# Patient Record
Sex: FEMALE | Race: WHITE | NOT HISPANIC OR LATINO | Employment: OTHER | URBAN - METROPOLITAN AREA
[De-identification: names, ages, dates, MRNs, and addresses within clinical notes are randomized per-mention and may not be internally consistent; named-entity substitution may affect disease eponyms.]

---

## 2017-02-06 DIAGNOSIS — F41.9 ANXIETY: Primary | ICD-10-CM

## 2017-02-06 NOTE — TELEPHONE ENCOUNTER
Escitalopram 20 mg     Last Written Prescription Date: 08/08/2016  Last Fill Quantity: 90, # refills: 1  Last Office Visit with [facility]:  11/2/2016   Next 5 appointments (look out 90 days)     Mar 09, 2017  3:45 PM   Return Visit with Sridhar Doherty MD   [facility]    [address] [place] [address] [phone]                   Last PHQ-9 score on record=   PHQ-9 SCORE 3/15/2016   Total Score -   Total Score MyChart -   Total Score 6

## 2017-02-07 NOTE — TELEPHONE ENCOUNTER
Routing refill request to provider for review/approval because:  PHQ-9 is greater than 4  Route to provider to review refill and depression/anxiety plan.    Garcia Laureano RN

## 2017-02-08 RX ORDER — ESCITALOPRAM OXALATE 20 MG/1
20 TABLET ORAL DAILY
Qty: 30 TABLET | Refills: 0 | Status: SHIPPED | OUTPATIENT
Start: 2017-02-08 | End: 2017-02-27

## 2017-02-08 NOTE — TELEPHONE ENCOUNTER
LM for pt to return call, when call is returned give information below and schedule OV.      Jody Powers CMA ([facility])

## 2017-02-08 NOTE — TELEPHONE ENCOUNTER
Pt needs ov , not evisit or phone needs ov before more rd, 30 days approved  Tiffanie Montes PA-C

## 2017-02-13 ENCOUNTER — TELEPHONE (OUTPATIENT)
Dept: DERMATOLOGY | Facility: CLINIC | Age: 47
End: 2017-02-13

## 2017-02-13 NOTE — TELEPHONE ENCOUNTER
[facility] Call Center    Phone Message    Name of Caller: Sumaya     Phone Number: Home number on file [phone] (home) or Cell number on file:    Telephone Information:   Mobile [phone]       Best time to return call: Any    May a detailed message be left on voicemail: yes    Relation to patient: Self    Reason for Call: Order(s): Other:  What is being requested: Patient is requesting a order for Subcutaneous nodule, Most consistent with cyst, right neck that was discussed at last Office visit with Dr. Golden 03/24/16.    Reason for requested: Patient states they discuss a surgeon referral for cysts/nodules. Patient also has another cyst that developed. Please call to discuss.  Date needed: asap  Provider name: Dr. Golden       Action Taken: Message routed to:  Adult Clinics: Dermatology p [phone]

## 2017-02-13 NOTE — TELEPHONE ENCOUNTER
Millicent Golden MD   [facility] Derm Patient Care 3 hours ago (11:31 AM)               This patient can be schedule with Marcelino for excision in our usual outpatient clinic.            Called patient and gave MD notes and scheduled appoint for 4/6/17 at 1:15pm for an excision with Dr. Benson. Also added the patient to the wait list to see if procedure could be done sooner then 4/6/17.    Amada Zuniga Medical Assistant

## 2017-02-13 NOTE — TELEPHONE ENCOUNTER
This LPN called and spoke with patient. Patient stated that she has a cyst on the back on her neck that she would like removed. According to the patient, Dr. Golden said it could not be in clinic but at a hospital. She also stated she spoke with a financial counselor last year (was not sure who, possible Caroline Fuentes) regarding a PA. Patient was last seen in July 2016. This LPN does not see any documentation regarding this. Forwarding telephone encounter to Dr. Golden and Denisha Vizcarra with how they advise how and where we proceed with the matter.    Nicole Gavin LPN

## 2017-02-13 NOTE — TELEPHONE ENCOUNTER
If it were to be done in a hospital then I would not PA the procedure. If it were to be done in the ASC I would do the PA depending on the physician. Dr. Golden- would this need to be done elsewhere?

## 2017-02-21 NOTE — PROGRESS NOTES
"  SUBJECTIVE:                                                    Sumaya Gatica is a 46 year old female who presents to clinic today for the following health issues:        Depression and Anxiety Follow-Up    Status since last visit: No change    Other associated symptoms:depression has been good but anxiety maybe a little worse, but so much better overall    Complicating factors: decreased  Concentration and easy distractibility .  Has never been diagnosed with ADD ADHD  States she is able to complete tasks but her mind's eye was wandering and thinking about what's next    Significant life event: Yes-  Distracted easily, restless-can't sit still     Current substance abuse: None    PHQ-9 SCORE 5/21/2015 3/14/2016 3/15/2016   Total Score 6 - -   Total Score MyChart - 6 (Mild depression) -   Total Score - - 6     GEOVANY-7 SCORE 5/21/2015 3/14/2016 3/15/2016   Total Score 8 - -   Total Score - 18 (severe anxiety) -   Total Score - - 18        PHQ-9  English      PHQ-9   Any Language     GAD7       Amount of exercise or physical activity: mostly with work as she does     Problems taking medications regularly: No    Medication side effects: none  Diet: regular (no restrictions)        Problem list and histories reviewed & adjusted, as indicated.  Additional history: as documented    BP Readings from Last 3 Encounters:   02/27/17 106/64   08/30/16 100/70   07/05/16 135/85    Wt Readings from Last 3 Encounters:   02/27/17 201 lb (91.2 kg)   08/30/16 166 lb (75.3 kg)   03/28/16 169 lb 12.8 oz (77 kg)                  Labs reviewed in [facility]    ROS:  As documented above     OBJECTIVE:                                                    /64  Pulse 74  Temp 98.2  F (36.8  C) (Oral)  Resp 12  Ht 5' 8\" (1.727 m)  Wt 201 lb (91.2 kg)  LMP 06/15/2015  BMI 30.56 kg/m2  Body mass index is 30.56 kg/(m^2).  GENERAL: healthy, alert and no distress  PSYCH: mentation appears normal, affect normal/bright    Diagnostic Test " Results:  none      ASSESSMENT/PLAN:                                                            1. Anxiety  We'll add Wellbutrin to see if this helps with difficulty concentrating  - escitalopram (LEXAPRO) 20 MG tablet; Take 1 tablet (20 mg) by mouth daily  Dispense: 90 tablet; Refill: 1  - buPROPion (WELLBUTRIN XL) 150 MG 24 hr tablet; Take 1 tablet (150 mg) by mouth every morning  Dispense: 30 tablet; Refill: 1    2. Difficulty concentrating  If this is not successful, consider referral for ADD ADHD evaluation  - buPROPion (WELLBUTRIN XL) 150 MG 24 hr tablet; Take 1 tablet (150 mg) by mouth every morning  Dispense: 30 tablet; Refill: 1    3. CARDIOVASCULAR SCREENING; LDL GOAL LESS THAN 160    - **Lipid panel reflex to direct LDL FUTURE anytime; Future    We'll do a phone visit in 1 month for a recheck    Tiffanie Montes PA-C  [facility]  Electronically signed by Tiffanie Montes PA-C

## 2017-02-27 ENCOUNTER — OFFICE VISIT (OUTPATIENT)
Dept: FAMILY MEDICINE | Facility: OTHER | Age: 47
End: 2017-02-27
Payer: COMMERCIAL

## 2017-02-27 VITALS
WEIGHT: 201 LBS | HEIGHT: 68 IN | TEMPERATURE: 98.2 F | HEART RATE: 74 BPM | BODY MASS INDEX: 30.46 KG/M2 | SYSTOLIC BLOOD PRESSURE: 106 MMHG | DIASTOLIC BLOOD PRESSURE: 64 MMHG | RESPIRATION RATE: 12 BRPM

## 2017-02-27 DIAGNOSIS — F41.9 ANXIETY: ICD-10-CM

## 2017-02-27 DIAGNOSIS — R41.840 DIFFICULTY CONCENTRATING: ICD-10-CM

## 2017-02-27 DIAGNOSIS — Z13.6 CARDIOVASCULAR SCREENING; LDL GOAL LESS THAN 160: Primary | ICD-10-CM

## 2017-02-27 PROCEDURE — 99213 OFFICE O/P EST LOW 20 MIN: CPT | Performed by: PHYSICIAN ASSISTANT

## 2017-02-27 RX ORDER — BUPROPION HYDROCHLORIDE 150 MG/1
150 TABLET ORAL EVERY MORNING
Qty: 30 TABLET | Refills: 1 | Status: SHIPPED | OUTPATIENT
Start: 2017-02-27 | End: 2017-10-01

## 2017-02-27 RX ORDER — ESCITALOPRAM OXALATE 20 MG/1
20 TABLET ORAL DAILY
Qty: 90 TABLET | Refills: 1 | Status: SHIPPED | OUTPATIENT
Start: 2017-02-27 | End: 2017-09-04

## 2017-02-27 ASSESSMENT — ANXIETY QUESTIONNAIRES
2. NOT BEING ABLE TO STOP OR CONTROL WORRYING: SEVERAL DAYS
7. FEELING AFRAID AS IF SOMETHING AWFUL MIGHT HAPPEN: SEVERAL DAYS
1. FEELING NERVOUS, ANXIOUS, OR ON EDGE: SEVERAL DAYS
6. BECOMING EASILY ANNOYED OR IRRITABLE: SEVERAL DAYS
GAD7 TOTAL SCORE: 7
IF YOU CHECKED OFF ANY PROBLEMS ON THIS QUESTIONNAIRE, HOW DIFFICULT HAVE THESE PROBLEMS MADE IT FOR YOU TO DO YOUR WORK, TAKE CARE OF THINGS AT HOME, OR GET ALONG WITH OTHER PEOPLE: NOT DIFFICULT AT ALL
5. BEING SO RESTLESS THAT IT IS HARD TO SIT STILL: SEVERAL DAYS
3. WORRYING TOO MUCH ABOUT DIFFERENT THINGS: SEVERAL DAYS

## 2017-02-27 ASSESSMENT — PAIN SCALES - GENERAL: PAINLEVEL: NO PAIN (0)

## 2017-02-27 ASSESSMENT — PATIENT HEALTH QUESTIONNAIRE - PHQ9: 5. POOR APPETITE OR OVEREATING: SEVERAL DAYS

## 2017-02-27 NOTE — NURSING NOTE
"Chief Complaint   Patient presents with     Anxiety     Depression     Not on PL     Panel Management     GEOVANY, Lipid, Flu       Initial /64  Pulse 74  Temp 98.2  F (36.8  C) (Oral)  Resp 12  Ht 5' 8\" (1.727 m)  Wt 201 lb (91.2 kg)  LMP 06/15/2015  BMI 30.56 kg/m2 Estimated body mass index is 30.56 kg/(m^2) as calculated from the following:    Height as of this encounter: 5' 8\" (1.727 m).    Weight as of this encounter: 201 lb (91.2 kg).  Medication Reconciliation: complete     Jody Powers CMA ([facility])      "

## 2017-02-27 NOTE — MR AVS SNAPSHOT
After Visit Summary   2/27/2017    Sumaya Gatica    MRN: [id number]           Patient Information     Date Of Birth          1970        Visit Information        Provider Department      2/27/2017 4:15 PM Tiffanie Montes PA-C [facility]        Today's Diagnoses     CARDIOVASCULAR SCREENING; LDL GOAL LESS THAN 160    -  1    Anxiety        Difficulty concentrating           Follow-ups after your visit        Your next 10 appointments already scheduled     Apr 06, 2017  1:15 PM CDT   Return Visit with Kajal Benson MD   [facility] ([facility])    [address] [place] [address] [phone]              Future tests that were ordered for you today     Open Future Orders        Priority Expected Expires Ordered    **Lipid panel reflex to direct LDL FUTURE anytime Routine 2/27/2017 2/27/2018 2/27/2017            Who to contact     If you have questions or need follow up information about today's clinic visit or your schedule please contact [facility] directly at [phone].  Normal or non-critical lab and imaging results will be communicated to you by [employer], letter or phone within 4 business days after the clinic has received the results. If you do not hear from us within 7 days, please contact the clinic through [employer] or phone. If you have a critical or abnormal lab result, we will notify you by phone as soon as possible.  Submit refill requests through [employer] or call your pharmacy and they will forward the refill request to us. Please allow 3 business days for your refill to be completed.          Additional Information About Your Visit        MyChart Information     [employer] gives you secure access to your electronic health record. If you see a primary care provider, you can also send messages to your care team and make appointments. If you have questions, please call your primary care clinic.  If  "you do not have a primary care provider, please call [phone] and they will assist you.        Care EveryWhere ID     This is your Care EveryWhere ID. This could be used by other organizations to access your [facility] medical records  [id number]        Your Vitals Were     Pulse Temperature Respirations Height Last Period BMI (Body Mass Index)    74 98.2  F (36.8  C) (Oral) 12 5' 8\" (1.727 m) 06/15/2015 30.56 kg/m2       Blood Pressure from Last 3 Encounters:   02/27/17 106/64   08/30/16 100/70   07/05/16 135/85    Weight from Last 3 Encounters:   02/27/17 201 lb (91.2 kg)   08/30/16 166 lb (75.3 kg)   03/28/16 169 lb 12.8 oz (77 kg)                 Today's Medication Changes          These changes are accurate as of: 2/27/17  5:04 PM.  If you have any questions, ask your nurse or doctor.               Start taking these medicines.        Dose/Directions    buPROPion 150 MG 24 hr tablet   Commonly known as:  WELLBUTRIN XL   Used for:  Anxiety, Difficulty concentrating   Started by:  Tiffanie Montes PA-C        Dose:  150 mg   Take 1 tablet (150 mg) by mouth every morning   Quantity:  30 tablet   Refills:  1         Stop taking these medicines if you haven't already. Please contact your care team if you have questions.     BUSPIRONE HCL PO   Stopped by:  Tiffanie Montes PA-C                Where to get your medicines      These medications were sent to [facility] [place] - [address]   [address] [place] [address]     Phone:  [phone]     buPROPion 150 MG 24 hr tablet    escitalopram 20 MG tablet                Primary Care Provider Office Phone # Fax #    Tiffanie Montes PA-C [phone]       [facility] [address] [place] [address]        Thank you!     Thank you for choosing [facility]  for your care. Our goal is always to provide you with excellent care. Hearing back from our patients is one way we can " continue to improve our services. Please take a few minutes to complete the written survey that you may receive in the mail after your visit with us. Thank you!             Your Updated Medication List - Protect others around you: Learn how to safely use, store and throw away your medicines at www.disposemymeds.org.          This list is accurate as of: 2/27/17  5:04 PM.  Always use your most recent med list.                   Brand Name Dispense Instructions for use    buPROPion 150 MG 24 hr tablet    WELLBUTRIN XL    30 tablet    Take 1 tablet (150 mg) by mouth every morning       escitalopram 20 MG tablet    LEXAPRO    90 tablet    Take 1 tablet (20 mg) by mouth daily       ibuprofen 200 MG tablet    ADVIL/MOTRIN     Take 400 mg by mouth every 4 hours as needed Reported on 2/27/2017       SUMAtriptan 50 MG tablet    IMITREX    12 tablet    Take 1 tablet (50 mg) by mouth See Admin Instructions WITH ONSET OF MIGRAINE, MAY REPEAT ONCE AFTER 2 HOURS. DO NOT EXCEED 4 TABLETS IN 24 HOURS.

## 2017-02-28 ASSESSMENT — PATIENT HEALTH QUESTIONNAIRE - PHQ9: SUM OF ALL RESPONSES TO PHQ QUESTIONS 1-9: 3

## 2017-02-28 ASSESSMENT — ANXIETY QUESTIONNAIRES: GAD7 TOTAL SCORE: 7

## 2017-03-07 DIAGNOSIS — F41.9 ANXIETY: ICD-10-CM

## 2017-03-07 NOTE — TELEPHONE ENCOUNTER
Escitalopram 20 mg     Last Written Prescription Date: 02/27/2017  Last Fill Quantity: 90, # refills: 1  [facility] with [facility] [place] care provider:  02/27/2017   Next 5 appointments (look out 90 days)     Apr 06, 2017  1:00 PM CDT   Return Visit with Kajal Benson MD   [facility] ([facility]    [address] [place] [address] [phone]                   Last PHQ-9 score on record=   PHQ-9 SCORE 2/27/2017   Total Score -   Total Score MyChart -   Total Score 3

## 2017-03-08 ENCOUNTER — TELEPHONE (OUTPATIENT)
Dept: FAMILY MEDICINE | Facility: OTHER | Age: 47
End: 2017-03-08

## 2017-03-08 RX ORDER — ESCITALOPRAM OXALATE 20 MG/1
TABLET ORAL
Qty: 30 TABLET | Refills: 0 | OUTPATIENT
Start: 2017-03-08

## 2017-03-08 NOTE — TELEPHONE ENCOUNTER
Just filled x6 months on 2/27/17 to same pharmacy. Refill not appropriate.    Erika Johnson, RN, BSN

## 2017-03-08 NOTE — TELEPHONE ENCOUNTER
Panel Management Review      Patient has the following on her problem list: None      Composite cancer screening  Chart review shows that this patient is due/due soon for the following None  Summary:    Patient is due/failing the following:   lipids and FOLLOW UP    Action needed:   Patient needs fasting lab only appointment for lipids. Order placed.  Patient needs to schedule phone visit for 1 month follow up.     Type of outreach:    Phone, spoke to patient.  Pt will schedule evisit through [employer] in about a month as she just started taking medicaiton today.    Questions for provider review:    None                                                                                                                                    Jody Powers CMA ([facility])       Chart routed to Care Team .

## 2017-04-03 ENCOUNTER — E-VISIT (OUTPATIENT)
Dept: FAMILY MEDICINE | Facility: OTHER | Age: 47
End: 2017-04-03
Payer: COMMERCIAL

## 2017-04-03 DIAGNOSIS — F41.9 ANXIETY: ICD-10-CM

## 2017-04-03 DIAGNOSIS — R41.840 DIFFICULTY CONCENTRATING: ICD-10-CM

## 2017-04-03 PROCEDURE — 98966 PH1 ASSMT&MGMT NQHP 5-10: CPT | Performed by: PHYSICIAN ASSISTANT

## 2017-04-03 RX ORDER — BUPROPION HYDROCHLORIDE 300 MG/1
300 TABLET ORAL EVERY MORNING
Qty: 30 TABLET | Refills: 1 | Status: SHIPPED | OUTPATIENT
Start: 2017-04-03 | End: 2017-10-01

## 2017-04-06 ENCOUNTER — OFFICE VISIT (OUTPATIENT)
Dept: DERMATOLOGY | Facility: CLINIC | Age: 47
End: 2017-04-06
Payer: COMMERCIAL

## 2017-04-06 VITALS — DIASTOLIC BLOOD PRESSURE: 76 MMHG | SYSTOLIC BLOOD PRESSURE: 108 MMHG

## 2017-04-06 DIAGNOSIS — D48.5 NEOPLASM OF UNCERTAIN BEHAVIOR OF SKIN: Primary | ICD-10-CM

## 2017-04-06 PROCEDURE — 11442 EXC FACE-MM B9+MARG 1.1-2 CM: CPT | Performed by: DERMATOLOGY

## 2017-04-06 PROCEDURE — 88305 TISSUE EXAM BY PATHOLOGIST: CPT | Performed by: DERMATOLOGY

## 2017-04-06 PROCEDURE — 11441 EXC FACE-MM B9+MARG 0.6-1 CM: CPT | Performed by: DERMATOLOGY

## 2017-04-06 PROCEDURE — 11421 EXC H-F-NK-SP B9+MARG 0.6-1: CPT | Performed by: DERMATOLOGY

## 2017-04-06 PROCEDURE — 88304 TISSUE EXAM BY PATHOLOGIST: CPT | Performed by: DERMATOLOGY

## 2017-04-06 PROCEDURE — 12051 INTMD RPR FACE/MM 2.5 CM/<: CPT | Mod: 59 | Performed by: DERMATOLOGY

## 2017-04-06 PROCEDURE — 12041 INTMD RPR N-HF/GENIT 2.5CM/<: CPT | Performed by: DERMATOLOGY

## 2017-04-06 NOTE — NURSING NOTE
Excision Intake  LMP 06/15/2015    artificial heart valve: No    artificial joint within the last 3 years: No    heart stent in the last 3 months: No    pacemaker: No    defibrillator: No    nerve/brain stimulator: No    bleeding disorder: No    coumadin use: No    heart valve disease: No    site location lower limb or groin: No    hepatitis B/C or HIV: No    smoker: No    Iodine allergy: No    Pallavi Ramos CMA

## 2017-04-06 NOTE — LETTER
Patient:  Sumaya Gatica  :   1970  MRN:     [id number]        Ms.Tracey EDDIE Gatica  [address]  [facility] [address]        April 10, 2017    Ms. Gatica,      We are writing to inform you of your test results that show these BENIGN FINDINGS. Nothing needs additional treatment.    A.  Skin, neck, right posterior:   - Pilar cyst - (see description)     B.  Skin, nasolabial fold, right: (harmless mole)  - Intradermal melanocytic nevus    C.  Skin, jawline, right: (harmless growth of hair follicles)  - Trichilemmoma, completely excised        If you have further questions or concerns, please contact the clinic at [phone].      Sincerely,    Leo Benson MD    Dermatology Department of Dermatology  [facility]    Resulted Orders   Surgical pathology exam   Result Value Ref Range    Copath Report       Patient Name: SUMAYA GATICA  MR#: [id number]  Specimen #: [id number]  Collected: 2017  Received: 2017  Reported: 4/10/2017 11:38  Ordering Phy(s): LEO BENSON    For improved result formatting, select 'View Enhanced Report Format'  under Linked Documents section.    SPECIMEN(S):  A: Skin, right posterior neck  B: Skin, right nasolabial fold  C: Skin, right jawline    FINAL DIAGNOSIS:  A.  Skin, neck, right posterior:  - Pilar cyst - (see description)    B.  Skin, nasolabial fold, right:  - Intradermal melanocytic nevus - (see description)    C.  Skin, jawline, right:  - Trichilemmoma, completely excised - (see description)    I have personally reviewed all specimens and or slides, including the  listed special stains, and used them with my medical judgement to  determine the final diagnosis.    Electronically signed out by:    Cyrus Franklin M.D., [facility]    CLINICAL HISTORY:  The patient is a 46-year-old female.    GROSS:  A. The specimen is received in form beronica with a proper  "patient's  identification, labeled \"skin, right posterior neck\" and consists of a  0.8 x 0.7 x 0.3 cm unoriented ovoid soft tissue fragment with attached  0.6 x 0.2 cm ellipse of skin tissue fragment.  The resection margins are  inked in black.  Serial sectioning demonstrates focal firm soft tissue  with calcification. Entirely submitted in two cassettes (A1 -tips).    B. The specimen is received in formalin with a proper patient's  identification, labeled \"skin, right nasolabial fold\" and consists of a  0.7 x 0.5 x 0.4 cm unoriented ellipse of skin tissue fragment.  The skin  surface shows pink papule. The resection margins are inked in black.  Serially sectioned and entirely submitted in two cassettes (B1 -tips).    C. The specimen is received in formalin with a proper patient's  identification, labeled \"skin, right jawline\" and consists of a 1.1 x  0.6 x 0.4 cm unoriented ellipse of skin tissue fragment.  The skin  surface shows 0.4 x 0.3 cm skin colored papule. T he resection margins  are inked in black.  Serially sectioned and entirely submitted in two  cassettes (C1 -tips). (Dictated by: Susana Smith 4/7/2017 11:02 AM)    MICROSCOPIC:  A.  Within the deep dermis and superficial subcutis, the specimen  exhibits a dilated cystic cavity filled with compact orthokeratosis and  foci of calcification, and lined by keratinizing squamous epithelium  with luminal pallor and no identifiable granular layer.  The lesion  extends to the lateral and deep margins.    B.  The specimen shows intradermal nests and strands of benign-appearing  nevus cells without a prominent junctional component.  The lesion  appears narrowly excised but is focally close the lateral margin.    C.  The specimen exhibits an endophytic, lobulated proliferation of pale  staining squamous epithelium with peripheral palisading and a thickened  basement membrane zone, with admixed fibrosis and a surrounding  lymphocytic infiltrate.  Superficial " fibrosis suggests scar from the  prior s urgical procedure.  This lesion is completely excised.    CPT Codes:  A: 08137-SV3.T, 50190-TK8.P  B: 37563-HN7.T, 88482-DP1.P  C: 16695-NF3.T, 75568-QM0.P    TESTING LAB LOCATION:  [facility], [address]   [address] [phone]    COLLECTION SITE:  Client: [facility]  Location: [facility] ()

## 2017-04-06 NOTE — PATIENT INSTRUCTIONS
Excision Wound Care Instructions  I will experience scar, altered skin color, bleeding, swelling, pain, crusting and redness. I may experience altered sensation. Risks are excessive bleeding, infection, muscle weakness, thick (hypertrophic or keloidal) scar, and recurrence,. A second procedure may be recommended to obtain the best cosmetic or functional result.  Possible complications of any surgical procedure are bleeding, infection, scarring, alteration in skin color and sensation, muscle weakness in the area, wound dehiscence or seperation, or recurrence of the lesion or disease. On occasion, after healing, a secondary procedure or revision may be recommended in order to obtain the best cosmetic or functional result.   After your surgery, a pressure bandage will be placed over the area that has sutures. This will help prevent bleeding. Please follow these instructions   as they will help you to prevent complications as your wound heals.  For the First 48 hours After Surgery:  1. Leave the pressure bandage on and keep it dry. If it should come loose, you may retape it, but do not take it off.  2. Relax and take it easy. Do not do any vigorous exercise, heavy lifting, or bending forward. This could cause the wound to bleed.  3. Post-operative pain is usually mild. You may take plain or extra strength Tylenol every 4 hours as needed (do not take more than 4,000mg in one day). Do not take any medicine that contains aspirin, ibuprofen or motrin unless you have been recommended these by a doctor.  Avoid alcohol and vitamin E as these may increase your tendency to bleed.  4. You may put an ice pack around the bandaged area for 20 minutes every 2-3 hours. This may help reduce swelling, bruising, and pain. Make sure the ice pack is waterproof so that the pressure bandage does not get wet.   5. You may see a small amount of drainage or blood on your pressure bandage. This is normal. However, if drainage or bleeding  continues or saturates the bandage, you will need to apply firm pressure over the bandage with a washcloth for 15 minutes. If bleeding continues after applying pressure for 15 minutes then go to the nearest emergency room.  48 Hours After Surgery  Carefully remove the bandage and start daily wound care and dressing changes. You may also now shower and get the wound wet. Wash wound with a mild soap and water.  Use caution when washing the wound. Be gentle and do not let the forceful shower stream hit the wound directly.  PAT dry.  Do not use HYDROGEN PEROXIDE  Daily Wound Care:  1. Wash wound with a mild soap and water.  Use caution when washing the wound, be gentle and do not let the forceful shower stream hit the wound directly.  2. PAT DRY.   Call Us If:  1. You have pain that is not controlled with Tylenol.  2. You have signs or symptoms of an infection, such as: fever over 100 degrees F, redness, warmth, or foul-smelling or yellow/creamy drainage from the wound.  Who should I call with questions?    [facility]: [phone]     [facility]: [phone]    For urgent needs outside of business hours call the [facility] at [phone] and ask for the dermatology resident on call

## 2017-04-06 NOTE — PROGRESS NOTES
DERMATOLOGY EXCISION PROCEDURE NOTE    Dermatology Problem List:  1. Acne vulgaris  -s/p chemical peels (20% sal acid X1) (30% sal acid X1)  -s/p clindamycin lotion initiated 3/24/2016  -s/p Differin initiated 3/24/2016  -s/p spironolactone initiated 3/24/2016  -s/p tretinoin initiated 5/21/2015-3/24/2016  -s/p doxycycline 1/19/2015-5/21/2015  -s/p Minocycline stopped 1/19/2015 with concern for early dyspigmentation  2. Notalgia paraesthetica, left shoulder-given patient is under chiropractic care, may consider spinal issue, s/p Lumbar Spine X-ray 1/19/15 showing progressive, moderate degenerative disc disease at L5-@1, with slightly lesser but progressive involvement of T11-12 as well. Per prior visit, recommend Sarna OTC for pruritus of the left posterior shoulder.  3. History of trichilemmoma, right jawline  -s/p biopsy 1/19/2015 (superficially sampled).   -Reexcised 04/06/2017  4. Rosacea    NAME OF PROCEDURE: Excision intermediate layered linear closure  Staff surgeon: Kajal Benson MD  Resident: None  Scrub Nurse: Briseyda Maradiaga LPN    Site 1 (right posterior neck):  PRE-OPERATIVE DIAGNOSIS:  Subcutaneous nodule  POST-OPERATIVE DIAGNOSIS: Same   FINAL EXCISION SIZE(DEFECT SIZE): 0.7 cm, with 1 mm margin   FINAL REPAIR LENGTH: 1.3 cm     Site 2 (right jaw line):  PRE-OPERATIVE DIAGNOSIS:  Recurrent trichilemmoma  POST-OPERATIVE DIAGNOSIS: Same   FINAL EXCISION SIZE(DEFECT SIZE): 1.0 x 0.8 cm, with 2 mm margin   FINAL REPAIR LENGTH: 1.6 cm     Site 3 (right nasolabial fold):  PRE-OPERATIVE DIAGNOSIS:  Pigmented papule  POST-OPERATIVE DIAGNOSIS: Same   FINAL EXCISION SIZE(DEFECT SIZE): 0.6 cm, with 1 mm margin   FINAL REPAIR LENGTH: 0.8 cm     INDICATIONS: This patient presented with...    Site 1: Right posterior neck: 0.5 mm diameter subcutaneous nodule.   Site 2: Right jaw line: 0.6 x 4 mm shiny firm papule.   Site 3: Right nasolabial fold: 4 mm tan papule.       Excision was indicated for all  sites. We discussed the principles of treatment and most likely complications including scarring, bleeding, infection, incomplete excision, wound dehiscence, pain, nerve damage, and recurrence. Informed consent was obtained and the patient underwent the procedure as follows:    PROCEDURE: The patient was taken to the operative suite. Time-out was performed.  The treatment area was anesthetized with 1% lidocaine with epinephrine. The area was prepped with Chlorhexidine and rinsed with sterile saline and draped with sterile towels. The lesion was delineated and excised down to subcutaneous fat in a fusiform manner. Hemostasis was obtained by electrocoagulation.     REPAIR: An intermediate layered linear closure was selected as the procedure which would maximally preserve both function and cosmesis.    After the excision of the tumor, the areas were carefully undermined. Hemostasis was obtained with electrocoagulation.  Closure was oriented so that the wounds were in the patient's natural skin tension lines. The subcutaneous and dermal layers were then closed with 5-0 vicryl sutures for all sites. The epidermis was then carefully approximated along the length of the wound using 5-0 fast gut simple running sutures for all sites.     The final cumulative wound length was 3.7 cm. A total of 8.0 ml of anesthesia was administered for all surgical sites. Estimated blood loss was less than 10 ml for all surgical sites. A sterile pressure dressing was applied and wound care instructions, with a written handout, were given. The [facility] alert and ambulatory.        Anatomic Pathology Results: pending    Clinical Follow-Up: As per Dr. Godlen.     Staff Involved:  Scribe/Staff  I, Olvin Cummings, am serving as a scribe to document services personally performed by Dr. Kajal Benson MD, based on data collection and the provider's statements to me.    Provider Disclosure:   I agree  with above History, Review of Systems, Physical exam and Plan. I have reviewed the content of the documentation and have edited it as needed. I have personally performed the services documented here and the documentation accurately represents those services and the decisions I have made.     Kajal Benson MD    Department of Dermatology  [facility]

## 2017-04-06 NOTE — PROGRESS NOTES
DERMATOLOGY EXCISION PROCEDURE NOTE    Dermatology Problem List:  1. Acne vulgaris  -s/p chemical peels (20% sal acid X1) (30% sal acid X1)  -s/p clindamycin lotion initiated 3/24/2016  -s/p Differin initiated 3/24/2016  -s/p spironolactone initiated 3/24/2016  -s/p tretinoin initiated 5/21/2015-3/24/2016  -s/p doxycycline 1/19/2015-5/21/2015  -s/p Minocycline stopped 1/19/2015 with concern for early dyspigmentation  2. Notalgia paraesthetica, left shoulder-given patient is under chiropractic care, may consider spinal issue, s/p Lumbar Spine X-ray 1/19/15 showing progressive, moderate degenerative disc disease at L5-@1, with slightly lesser but progressive involvement of T11-12 as well. Per prior visit, recommend Sarna OTC for pruritus of the left posterior shoulder.  3. History of trichilemmoma, right jawline  -s/p biopsy 1/19/2015 (superficially sampled)  4. Rosacea    NAME OF PROCEDURE: Excision intermediate layered linear closure  Staff surgeon: Kajal Benson MD  Resident: None  Scrub Nurse: Briseyda Maradiaga LPN    PRE-OPERATIVE DIAGNOSIS:  cyst  POST-OPERATIVE DIAGNOSIS: Same   FINAL EXCISION SIZE(DEFECT SIZE): *** cm, with *** mm margin   FINAL REPAIR LENGTH: *** cm     INDICATIONS: This patient presented with...    Right posterior neck: 0.5 mm diameter subcutaneous nodule  Right jaw line: 0.6 x 4 mm shiny firm papule  Right nasolabial fold: 4 mm tan papule      Excision was indicated. We discussed the principles of treatment and most likely complications including scarring, bleeding, infection, incomplete excision, wound dehiscence, pain, nerve damage, and recurrence. Informed consent was obtained and the patient underwent the procedure as follows:    PROCEDURE: The patient was taken to the operative suite. Time-out was performed.  The treatment area was anesthetized with 1% lidocaine with epinephrine. The area was prepped with Chlorhexidine and rinsed with sterile saline and draped with sterile  towels. The lesion was delineated and excised down to subcutaneous fat in a fusiform manner. Hemostasis was obtained by electrocoagulation.     REPAIR: An intermediate layered linear closure was selected as the procedure which would maximally preserve both function and cosmesis.    After the excision of the tumor, the area was carefully *** undermined. Hemostasis was obtained with *** electrocoagulation.  Closure was oriented so that the wound was in the patient's natural skin tension lines. The subcutaneous and dermal layers were then closed with ***vicryl sutures. The epidermis was then carefully approximated along the length of the wound using ***{ethilon/prolene:221767} {suture technique:792881} sutures.     The final wound length was *** cm. A total of *** ml of anesthesia was administered for all surgical sites. Estimated blood loss was less than 10 ml for all surgical sites. A sterile pressure dressing was applied and wound care instructions, with a written handout, were given. The patient was discharged from the [facility] alert and ambulatory.    Follow-up in ***weeks for suture removal.     Anatomic Pathology Results: pending    Clinical Follow-Up: As per Dr. Golden.     Staff Involved:  Scribe/Staff  I, Olvin Cummings, am serving as a scribe to document services personally performed by Dr. Kajal Benson MD, based on data collection and the provider's statements to me.

## 2017-04-06 NOTE — MR AVS SNAPSHOT
After Visit Summary   4/6/2017    Sumaya Gatica    MRN: [id number]           Patient Information     Date Of Birth          1970        Visit Information        Provider Department      4/6/2017 1:00 PM Kajal Benson MD [facility]        Today's Diagnoses     Neoplasm of uncertain behavior of skin    -  1      Care Instructions    Excision Wound Care Instructions  I will experience scar, altered skin color, bleeding, swelling, pain, crusting and redness. I may experience altered sensation. Risks are excessive bleeding, infection, muscle weakness, thick (hypertrophic or keloidal) scar, and recurrence,. A second procedure may be recommended to obtain the best cosmetic or functional result.  Possible complications of any surgical procedure are bleeding, infection, scarring, alteration in skin color and sensation, muscle weakness in the area, wound dehiscence or seperation, or recurrence of the lesion or disease. On occasion, after healing, a secondary procedure or revision may be recommended in order to obtain the best cosmetic or functional result.   After your surgery, a pressure bandage will be placed over the area that has sutures. This will help prevent bleeding. Please follow these instructions   as they will help you to prevent complications as your wound heals.  For the First 48 hours After Surgery:  1. Leave the pressure bandage on and keep it dry. If it should come loose, you may retape it, but do not take it off.  2. Relax and take it easy. Do not do any vigorous exercise, heavy lifting, or bending forward. This could cause the wound to bleed.  3. Post-operative pain is usually mild. You may take plain or extra strength Tylenol every 4 hours as needed (do not take more than 4,000mg in one day). Do not take any medicine that contains aspirin, ibuprofen or motrin unless you have been recommended these by a doctor.  Avoid alcohol and vitamin E as these may  increase your tendency to bleed.  4. You may put an ice pack around the bandaged area for 20 minutes every 2-3 hours. This may help reduce swelling, bruising, and pain. Make sure the ice pack is waterproof so that the pressure bandage does not get wet.   5. You may see a small amount of drainage or blood on your pressure bandage. This is normal. However, if drainage or bleeding continues or saturates the bandage, you will need to apply firm pressure over the bandage with a washcloth for 15 minutes. If bleeding continues after applying pressure for 15 minutes then go to the nearest emergency room.  48 Hours After Surgery  Carefully remove the bandage and start daily wound care and dressing changes. You may also now shower and get the wound wet. Wash wound with a mild soap and water.  Use caution when washing the wound. Be gentle and do not let the forceful shower stream hit the wound directly.  PAT dry.  Do not use HYDROGEN PEROXIDE  Daily Wound Care:  1. Wash wound with a mild soap and water.  Use caution when washing the wound, be gentle and do not let the forceful shower stream hit the wound directly.  2. PAT DRY.   Call Us If:  1. You have pain that is not controlled with Tylenol.  2. You have signs or symptoms of an infection, such as: fever over 100 degrees F, redness, warmth, or foul-smelling or yellow/creamy drainage from the wound.  Who should I call with questions?    [facility]: [phone]     [facility]: [phone]    For urgent needs outside of business hours call the [facility] at [phone] and ask for the dermatology resident on call            Follow-ups after your visit        Who to contact     If you have questions or need follow up information about today's clinic visit or your schedule please contact [facility] directly at [phone].  Normal or non-critical lab and imaging results will be  communicated to you by [employer], letter or phone within 4 business days after the clinic has received the results. If you do not hear from us within 7 days, please contact the clinic through [employer] or phone. If you have a critical or abnormal lab result, we will notify you by phone as soon as possible.  Submit refill requests through [employer] or call your pharmacy and they will forward the refill request to us. Please allow 3 business days for your refill to be completed.          Additional Information About Your Visit        [employer] Information     [employer] gives you secure access to your electronic health record. If you see a primary care provider, you can also send messages to your care team and make appointments. If you have questions, please call your primary care clinic.  If you do not have a primary care provider, please call [phone] and they will assist you.      [employer] is an electronic gateway that provides easy, online access to your medical records. With [employer], you can request a clinic appointment, read your test results, renew a prescription or communicate with your care team.     To access your existing account, please contact your [facility] or call [phone] for assistance.        Care EveryWhere ID     This is your Care EveryWhere ID. This could be used by other organizations to access your [facility] medical records  [id number]        Your Vitals Were     Last Period                   06/15/2015            Blood Pressure from Last 3 Encounters:   04/06/17 108/76   02/27/17 106/64   08/30/16 100/70    Weight from Last 3 Encounters:   02/27/17 91.2 kg (201 lb)   08/30/16 75.3 kg (166 lb)   03/28/16 77 kg (169 lb 12.8 oz)              Today, you had the following     No orders found for display       Primary Care Provider Office Phone # Fax #    Tiffanie Montes PA-C [phone]       [facility] [address] [place] [address]         Thank you!     Thank you for choosing [facility]  for your care. Our goal is always to provide you with excellent care. Hearing back from our patients is one way we can continue to improve our services. Please take a few minutes to complete the written survey that you may receive in the mail after your visit with us. Thank you!             Your Updated Medication List - Protect others around you: Learn how to safely use, store and throw away your medicines at www.disposemymeds.org.          This list is accurate as of: 4/6/17  2:15 PM.  Always use your most recent med list.                   Brand Name Dispense Instructions for use    * buPROPion 150 MG 24 hr tablet    WELLBUTRIN XL    30 tablet    Take 1 tablet (150 mg) by mouth every morning       * buPROPion 300 MG 24 hr tablet    WELLBUTRIN XL    30 tablet    Take 1 tablet (300 mg) by mouth every morning       escitalopram 20 MG tablet    LEXAPRO    90 tablet    Take 1 tablet (20 mg) by mouth daily       ibuprofen 200 MG tablet    ADVIL/MOTRIN     Take 400 mg by mouth every 4 hours as needed Reported on 2/27/2017       SUMAtriptan 50 MG tablet    IMITREX    12 tablet    Take 1 tablet (50 mg) by mouth See Admin Instructions WITH ONSET OF MIGRAINE, MAY REPEAT ONCE AFTER 2 HOURS. DO NOT EXCEED 4 TABLETS IN 24 HOURS.       * Notice:  This list has 2 medication(s) that are the same as other medications prescribed for you. Read the directions carefully, and ask your doctor or other care provider to review them with you.

## 2017-04-10 LAB — COPATH REPORT: NORMAL

## 2017-04-17 ENCOUNTER — TELEPHONE (OUTPATIENT)
Dept: FAMILY MEDICINE | Facility: OTHER | Age: 47
End: 2017-04-17

## 2017-04-17 NOTE — TELEPHONE ENCOUNTER
Panel Management Review      Patient has the following on her problem list: None      Composite cancer screening  Chart review shows that this patient is due/due soon for the following None  Summary:    Patient is due/failing the following:   lipids    Action needed:   Patient needs fasting lab only appointment for cholesterol. Order placed.    Type of outreach:    Phone, spoke to patient.  Pt is scheduled for lab only appt in [place]    Questions for provider review:    None                                                                                                                                    Jody Powers CMA ([facility])       Chart routed to Care Team .

## 2017-04-22 DIAGNOSIS — Z13.6 CARDIOVASCULAR SCREENING; LDL GOAL LESS THAN 160: ICD-10-CM

## 2017-04-22 LAB
CHOLEST SERPL-MCNC: 220 MG/DL
HDLC SERPL-MCNC: 88 MG/DL
LDLC SERPL CALC-MCNC: 104 MG/DL
NONHDLC SERPL-MCNC: 132 MG/DL
TRIGL SERPL-MCNC: 138 MG/DL

## 2017-04-22 PROCEDURE — 36415 COLL VENOUS BLD VENIPUNCTURE: CPT | Performed by: PHYSICIAN ASSISTANT

## 2017-04-22 PROCEDURE — 80061 LIPID PANEL: CPT | Performed by: PHYSICIAN ASSISTANT

## 2017-05-02 ENCOUNTER — MYC MEDICAL ADVICE (OUTPATIENT)
Dept: FAMILY MEDICINE | Facility: OTHER | Age: 47
End: 2017-05-02

## 2017-05-10 ENCOUNTER — MYC MEDICAL ADVICE (OUTPATIENT)
Dept: FAMILY MEDICINE | Facility: OTHER | Age: 47
End: 2017-05-10

## 2017-05-10 NOTE — TELEPHONE ENCOUNTER
Last OV 02/27/2017 : anxiety.      NP would you like to see her to discuss in person or telephone visit.    Garcia Laureano, RN, BSN

## 2017-05-15 ENCOUNTER — OFFICE VISIT (OUTPATIENT)
Dept: FAMILY MEDICINE | Facility: OTHER | Age: 47
End: 2017-05-15
Payer: COMMERCIAL

## 2017-05-15 ENCOUNTER — RADIANT APPOINTMENT (OUTPATIENT)
Dept: GENERAL RADIOLOGY | Facility: OTHER | Age: 47
End: 2017-05-15
Attending: FAMILY MEDICINE
Payer: COMMERCIAL

## 2017-05-15 VITALS
SYSTOLIC BLOOD PRESSURE: 114 MMHG | RESPIRATION RATE: 16 BRPM | HEART RATE: 74 BPM | DIASTOLIC BLOOD PRESSURE: 72 MMHG | WEIGHT: 201 LBS | BODY MASS INDEX: 30.46 KG/M2 | HEIGHT: 68 IN | TEMPERATURE: 98.4 F

## 2017-05-15 DIAGNOSIS — G89.29 CHRONIC RIGHT-SIDED THORACIC BACK PAIN: ICD-10-CM

## 2017-05-15 DIAGNOSIS — M54.6 CHRONIC RIGHT-SIDED THORACIC BACK PAIN: Primary | ICD-10-CM

## 2017-05-15 DIAGNOSIS — M54.6 CHRONIC RIGHT-SIDED THORACIC BACK PAIN: ICD-10-CM

## 2017-05-15 DIAGNOSIS — M62.830 BACK MUSCLE SPASM: ICD-10-CM

## 2017-05-15 DIAGNOSIS — G89.29 CHRONIC RIGHT-SIDED THORACIC BACK PAIN: Primary | ICD-10-CM

## 2017-05-15 PROCEDURE — 72070 X-RAY EXAM THORAC SPINE 2VWS: CPT

## 2017-05-15 PROCEDURE — 99213 OFFICE O/P EST LOW 20 MIN: CPT | Performed by: FAMILY MEDICINE

## 2017-05-15 RX ORDER — CYCLOBENZAPRINE HCL 5 MG
5 TABLET ORAL 2 TIMES DAILY PRN
Qty: 30 TABLET | Refills: 0 | Status: SHIPPED | OUTPATIENT
Start: 2017-05-15 | End: 2017-10-01

## 2017-05-15 ASSESSMENT — PAIN SCALES - GENERAL: PAINLEVEL: WORST PAIN (10)

## 2017-05-15 NOTE — NURSING NOTE
"Chief Complaint   Patient presents with     Back Pain     Panel Management     utd        Initial /72  Pulse 74  Temp 98.4  F (36.9  C) (Temporal)  Resp 16  Ht 5' 8\" (1.727 m)  Wt 201 lb (91.2 kg)  LMP 06/15/2015  Breastfeeding? No  BMI 30.56 kg/m2 Estimated body mass index is 30.56 kg/(m^2) as calculated from the following:    Height as of this encounter: 5' 8\" (1.727 m).    Weight as of this encounter: 201 lb (91.2 kg).  Medication Reconciliation: complete     Aga Gavin MA    "

## 2017-05-15 NOTE — PATIENT INSTRUCTIONS
Back Spasm (No Trauma)    Spasm of the back muscles can occur after a sudden forceful twisting or bending force (such as in a car accident), after a simple awkward movement, or after lifting something heavy with poor body positioning. In either case, muscle spasm adds to the pain. Sleeping in an awkward position or on a poor quality mattress can also cause this. Some persons respond to emotional stress by tensing the muscles of their back.  Pain that continues may require further evaluation or other types of treatment such as physical therapy.  Unless you had a physical injury (for example, a car accident or fall), X-rays are usually not ordered for the initial evaluation of back pain. If pain continues and does not respond to medical treatment, X-rays and other tests may be performed at a later time.   Home care  1. As soon as possible, begin sitting or walking to avoid problems from prolonged bedrest (muscle weakness, worsening back stiffness and pain, blood clots in the legs).  2. When in bed, try to find a position of comfort. A firm mattress is best. Try lying flat on your back with pillows under your knees. You can also try lying on your side with your knees bent up toward your chest and a pillow between your knees.  3. Avoid prolonged sitting, long car rides or travel. This puts more stress on the lower back than standing or walking.   4. During the first 24 to 72 hours after an injury of flare-up apply an ice pack to the painful area for 20 minutes, then remove it for 20 minutes over a period of 60 to 90 minutes or several times a day. This will reduce swelling and pain. Always wrap ice packs in a thin towel.  5. You can start with ice, then switch to heat. Heat (hot shower, hot bath, or heating pad) reduces swelling and pain, and works well for muscle spasms. Heat can be applied to the painful area for 20 minutes , then remove it for 20 minutes over a period of 60 to 90 minutes or several times a day. As  a safety precaution, do not sleep on a heating pad as it can burn or damage skin.  6. Ice and heat therapies can be alternated.  7. Gentle stretching will help your back heal faster. Perform this simple routine 2 to 3 times a day until your back is feeling better.     Low back stretch    Lie on your back with your knees bent and both feet on the ground.    Slowly raise your left knee to your chest as you flatten your lower back against the floor. Hold for 20 to 30 seconds.    Relax and repeat the exercise with your right knee.    Do 2 to 3 of these exercises for each leg.    Repeat, hugging both knees to your chest at the same time.    Do not bounce, but use a gentle pull.    8. Be aware of safe lifting methods and do not lift anything over 15 pounds until all the pain is gone.  Medications  Talk to your doctor before using medications, especially if you have other medical problems or are taking other medicines.  You may use acetaminophen (such as Tylenol) or ibuprofen (such as Advil or Motrin) to control pain, unless another pain medicine was prescribed. If you have a chronic condition such as diabetes, liver or kidney disease, stomach ulcer, or gastrointestinal bleeding, or are taking blood thinners, talk with your doctor before taking any medications.  Be careful if you are given prescription pain medications, narcotics, or medication for muscle spasm. They can cause drowsiness, affect your coordination, reflexes or judgment. Do not drive or operate heavy machinery.  Follow-up care  Follow up with your doctor or this facility if your symptoms do not start to improve after one week. Physical therapy or further tests may be needed.  If X-rays were taken, they will be reviewed by a radiologist. You will be notified of any new findings that may affect your care.  Call 911  Seek emergency medica care if any of the following occur:    Trouble breathing    Confusion    Drowsiness or trouble awakening    Fainting or loss  of consciousness    Rapid or very slow heart rate    Loss of bowel or bladder control  When to seek medical care  Get prompt medicat attention if any of the following occur:    Pain becomes worse or spreads to your legs    Weakness or numbness in one or both legs    Numbness in the groin or genital area    Unexplained fever over 100.4 F (38.0 C)    Burning or pain when passing urine    [id number] [employer]. [address] [place], [place] [id number]. All rights reserved. This information is not intended as a substitute for professional medical care. Always follow your healthcare professional's instructions.

## 2017-05-15 NOTE — MR AVS SNAPSHOT
After Visit Summary   5/15/2017    Sumaya Gatica    MRN: [id number]           Patient Information     Date Of Birth          1970        Visit Information        Provider Department      5/15/2017 5:20 PM Tg Orlando MD [facility]        Today's Diagnoses     Chronic right-sided thoracic back pain    -  1    Back muscle spasm          Care Instructions      Back Spasm (No Trauma)    Spasm of the back muscles can occur after a sudden forceful twisting or bending force (such as in a car accident), after a simple awkward movement, or after lifting something heavy with poor body positioning. In either case, muscle spasm adds to the pain. Sleeping in an awkward position or on a poor quality mattress can also cause this. Some persons respond to emotional stress by tensing the muscles of their back.  Pain that continues may require further evaluation or other types of treatment such as physical therapy.  Unless you had a physical injury (for example, a car accident or fall), X-rays are usually not ordered for the initial evaluation of back pain. If pain continues and does not respond to medical treatment, X-rays and other tests may be performed at a later time.   Home care  1. As soon as possible, begin sitting or walking to avoid problems from prolonged bedrest (muscle weakness, worsening back stiffness and pain, blood clots in the legs).  2. When in bed, try to find a position of comfort. A firm mattress is best. Try lying flat on your back with pillows under your knees. You can also try lying on your side with your knees bent up toward your chest and a pillow between your knees.  3. Avoid prolonged sitting, long car rides or travel. This puts more stress on the lower back than standing or walking.   4. During the first 24 to 72 hours after an injury of flare-up apply an ice pack to the painful area for 20 minutes, then remove it for 20 minutes over a period of 60 to 90  minutes or several times a day. This will reduce swelling and pain. Always wrap ice packs in a thin towel.  5. You can start with ice, then switch to heat. Heat (hot shower, hot bath, or heating pad) reduces swelling and pain, and works well for muscle spasms. Heat can be applied to the painful area for 20 minutes , then remove it for 20 minutes over a period of 60 to 90 minutes or several times a day. As a safety precaution, do not sleep on a heating pad as it can burn or damage skin.  6. Ice and heat therapies can be alternated.  7. Gentle stretching will help your back heal faster. Perform this simple routine 2 to 3 times a day until your back is feeling better.     Low back stretch    Lie on your back with your knees bent and both feet on the ground.    Slowly raise your left knee to your chest as you flatten your lower back against the floor. Hold for 20 to 30 seconds.    Relax and repeat the exercise with your right knee.    Do 2 to 3 of these exercises for each leg.    Repeat, hugging both knees to your chest at the same time.    Do not bounce, but use a gentle pull.    8. Be aware of safe lifting methods and do not lift anything over 15 pounds until all the pain is gone.  Medications  Talk to your doctor before using medications, especially if you have other medical problems or are taking other medicines.  You may use acetaminophen (such as Tylenol) or ibuprofen (such as Advil or Motrin) to control pain, unless another pain medicine was prescribed. If you have a chronic condition such as diabetes, liver or kidney disease, stomach ulcer, or gastrointestinal bleeding, or are taking blood thinners, talk with your doctor before taking any medications.  Be careful if you are given prescription pain medications, narcotics, or medication for muscle spasm. They can cause drowsiness, affect your coordination, reflexes or judgment. Do not drive or operate heavy machinery.  Follow-up care  Follow up with your doctor or  this facility if your symptoms do not start to improve after one week. Physical therapy or further tests may be needed.  If X-rays were taken, they will be reviewed by a radiologist. You will be notified of any new findings that may affect your care.  Call [phone]  Seek emergency medica care if any of the following occur:    Trouble breathing    Confusion    Drowsiness or trouble awakening    Fainting or loss of consciousness    Rapid or very slow heart rate    Loss of bowel or bladder control  When to seek medical care  Get prompt medicat attention if any of the following occur:    Pain becomes worse or spreads to your legs    Weakness or numbness in one or both legs    Numbness in the groin or genital area    Unexplained fever over 100.4 F (38.0 C)    Burning or pain when passing urine    [id number] [employer]. [address] [place] [address]. All rights reserved. This information is not intended as a substitute for professional medical care. Always follow your healthcare professional's instructions.              Follow-ups after your visit        Additional Services     PHYSICAL THERAPY REFERRAL       *This therapy referral will be filtered to a centralized scheduling office at [facility] and the patient will receive a call to schedule an appointment at a [place] location most convenient for them. *     [facility] provides Physical Therapy evaluation and treatment and many specialty services across the [place] system.  If requesting a specialty program, please choose from the list below.    If you have not heard from the scheduling office within 2 business days, please call [phone] for all locations, with the exception of [place], please call [phone].  Treatment: Evaluation & Treatment  Special Instructions/Modalities:   Special Programs:     Please be aware that coverage of these services is subject to the terms and limitations of your  "health insurance plan.  Call member services at your health plan with any benefit or coverage questions.      **Note to Provider:  If you are referring outside of [facility] for the therapy appointment, please list the name of the location in the  special instructions  above, print the referral and give to the patient to schedule the appointment.                  Who to contact     If you have questions or need follow up information about today's clinic visit or your schedule please contact [facility] directly at [phone].  Normal or non-critical lab and imaging results will be communicated to you by [employer], letter or phone within 4 business days after the clinic has received the results. If you do not hear from us within 7 days, please contact the clinic through [employer] or phone. If you have a critical or abnormal lab result, we will notify you by phone as soon as possible.  Submit refill requests through [employer] or call your pharmacy and they will forward the refill request to us. Please allow 3 business days for your refill to be completed.          Additional Information About Your Visit        [employer] Information     [employer] gives you secure access to your electronic health record. If you see a primary care provider, you can also send messages to your care team and make appointments. If you have questions, please call your primary care clinic.  If you do not have a primary care provider, please call [phone] and they will assist you.        Care EveryWhere ID     This is your Care EveryWhere ID. This could be used by other organizations to access your [facility] medical records  [id number]        Your Vitals Were     Pulse Temperature Respirations Height Last Period Breastfeeding?    74 98.4  F (36.9  C) (Temporal) 16 5' 8\" (1.727 m) 06/15/2015 No    BMI (Body Mass Index)                   30.56 kg/m2            Blood Pressure from Last 3 Encounters:   05/15/17 114/72   04/06/17 108/76 "   02/27/17 106/64    Weight from Last 3 Encounters:   05/15/17 201 lb (91.2 kg)   02/27/17 201 lb (91.2 kg)   08/30/16 166 lb (75.3 kg)              We Performed the Following     PHYSICAL THERAPY REFERRAL          Today's Medication Changes          These changes are accurate as of: 5/15/17  5:59 PM.  If you have any questions, ask your nurse or doctor.               Start taking these medicines.        Dose/Directions    cyclobenzaprine 5 MG tablet   Commonly known as:  FLEXERIL   Used for:  Chronic right-sided thoracic back pain, Back muscle spasm   Started by:  Tg Orlando MD        Dose:  5 mg   Take 1 tablet (5 mg) by mouth 2 times daily as needed for muscle spasms   Quantity:  30 tablet   Refills:  0            Where to get your medicines      These medications were sent to [facility] - [address]   [address] [place] [address]     Phone:  [phone]     cyclobenzaprine 5 MG tablet                Primary Care Provider Office Phone # Fax #    Tiffanie Montes PA-C [phone]       [facility] [address] [place] [address]        Thank you!     Thank you for choosing [facility]  for your care. Our goal is always to provide you with excellent care. Hearing back from our patients is one way we can continue to improve our services. Please take a few minutes to complete the written survey that you may receive in the mail after your visit with us. Thank you!             Your Updated Medication List - Protect others around you: Learn how to safely use, store and throw away your medicines at www.disposemymeds.org.          This list is accurate as of: 5/15/17  5:59 PM.  Always use your most recent med list.                   Brand Name Dispense Instructions for use    * buPROPion 150 MG 24 hr tablet    WELLBUTRIN XL    30 tablet    Take 1 tablet (150 mg) by mouth every morning       * buPROPion 300 MG 24 hr  tablet    WELLBUTRIN XL    30 tablet    Take 1 tablet (300 mg) by mouth every morning       cyclobenzaprine 5 MG tablet    FLEXERIL    30 tablet    Take 1 tablet (5 mg) by mouth 2 times daily as needed for muscle spasms       escitalopram 20 MG tablet    LEXAPRO    90 tablet    Take 1 tablet (20 mg) by mouth daily       ibuprofen 200 MG tablet    ADVIL/MOTRIN     Take 400 mg by mouth every 4 hours as needed Reported on 2/27/2017       SUMAtriptan 50 MG tablet    IMITREX    12 tablet    Take 1 tablet (50 mg) by mouth See Admin Instructions WITH ONSET OF MIGRAINE, MAY REPEAT ONCE AFTER 2 HOURS. DO NOT EXCEED 4 TABLETS IN 24 HOURS.       * Notice:  This list has 2 medication(s) that are the same as other medications prescribed for you. Read the directions carefully, and ask your doctor or other care provider to review them with you.

## 2017-05-15 NOTE — PROGRESS NOTES
SUBJECTIVE:                                                    Sumaya Gatica is a 46 year old female who presents to clinic today for the following health issues:    Hx kidney stone, seen chiropracter, several times, flanking makes worse, layfdown and sit up makes worse.    Back Pain      Duration: 9 months ago intermittent flare ups         Specific cause: starting to exercise started aggravating back pain     Description:   Location of pain: middle of back right  Character of pain: sharp, dull ache and intermittent  Pain radiation:none  New numbness or weakness in legs, not attributed to pain:  no     Intensity: moderate     History:   Pain interferes with job: YES- occasionally   History of back problems: no prior back problems  Any previous MRI or X-rays: None  Sees a specialist for back pain:  No  Therapies tried without relief: chiropractor    Alleviating factors:   Improved by: none      Precipitating factors:  Worsened by: transitioning from laying to sitting position and vice versa, exercising.     Functional and Psychosocial Screen (Mike STarT Back):      Not performed today       Accompanying Signs & Symptoms:  Risk of Fracture:  None  Risk of Cauda Equina:  None  Risk of Infection:  None  Risk of Cancer:  None  Risk of Ankylosing Spondylitis:  Onset at age <35, male, AND morning back stiffness. no                      PROBLEMS TO ADD ON...    Problem list and histories reviewed & adjusted, as indicated.  Additional history: as documented    Patient Active Problem List   Diagnosis     Allergic rhinitis     Herpes simplex virus (HSV) infection     Abnormal glandular Papanicolaou smear of cervix     Lumbago     Anxiety     CARDIOVASCULAR SCREENING; LDL GOAL LESS THAN 160     Dermatitis     Rosacea     Acne vulgaris     Migraine without aura and without status migrainosus, not intractable     Female stress incontinence     Urinary urgency     Insomnia, unspecified type     Past Surgical History:    Procedure Laterality Date     COLONOSCOPY  2012    Procedure: COLONOSCOPY;  Colonscopy Screening, Diverticulitis;  Surgeon: Reilly Holt MD;  Location: [facility]     DILATION AND CURETTAGE, HYSTEROSCOPY, ABLATE ENDOMETRIUM NOVASURE, COMBINED  2011    Procedure:COMBINED DILATION AND CURETTAGE, HYSTEROSCOPY, ABLATE ENDOMETRIUM NOVASURE; hysteroscopy, dialtion and curettage, novasure endometrial ablation  ; Surgeon:MILAD VILLARREAL; Location: OR     GYN SURGERY      Hyst      HC REMOVAL OF TONSILS,<13 Y/O       HC REPAIR OF NASAL SEPTUM  08    Septoplasty, submucosal resection inferior turbinates.       Social History   Substance Use Topics     Smoking status: Never Smoker     Smokeless tobacco: Never Used     Alcohol use 0.0 oz/week     0 Standard drinks or equivalent per week      Comment: occ.     Family History   Problem Relation Age of Onset     Alcohol/Drug Paternal Grandfather      alcohlism     Other Cancer Paternal Grandfather      CANCER Maternal Grandfather      gallbladder     DIABETES Maternal Grandmother           CEREBROVASCULAR DISEASE Paternal Grandmother      Alzheimer Disease Paternal Grandmother      and dementia     Prostate Cancer Brother      DIABETES Mother      CANCER Brother      skin         Current Outpatient Prescriptions   Medication Sig Dispense Refill     cyclobenzaprine (FLEXERIL) 5 MG tablet Take 1 tablet (5 mg) by mouth 2 times daily as needed for muscle spasms 30 tablet 0     buPROPion (WELLBUTRIN XL) 300 MG 24 hr tablet Take 1 tablet (300 mg) by mouth every morning 30 tablet 1     escitalopram (LEXAPRO) 20 MG tablet Take 1 tablet (20 mg) by mouth daily 90 tablet 1     buPROPion (WELLBUTRIN XL) 150 MG 24 hr tablet Take 1 tablet (150 mg) by mouth every morning 30 tablet 1     SUMAtriptan (IMITREX) 50 MG tablet Take 1 tablet (50 mg) by mouth See Admin Instructions WITH ONSET OF MIGRAINE, MAY REPEAT ONCE AFTER 2 HOURS. DO NOT EXCEED 4 TABLETS IN 24  "HOURS. (Patient not taking: Reported on 2/27/2017) 12 tablet 5     ibuprofen (ADVIL,MOTRIN) 200 MG tablet Take 400 mg by mouth every 4 hours as needed Reported on 2/27/2017       Allergies   Allergen Reactions     No Known Drug Allergies      Seasonal Allergies        Reviewed and updated as needed this visit by clinical staff       Reviewed and updated as needed this visit by Provider  OBJECTIVE:  /72  Pulse 74  Temp 98.4  F (36.9  C) (Temporal)  Resp 16  Ht 5' 8\" (1.727 m)  Wt 201 lb (91.2 kg)  LMP 06/15/2015  Breastfeeding? No  BMI 30.56 kg/m2   Patient appears to be in mild to moderate pain, antalgic gait noted. Lumbosacral spine area reveals no local tenderness or mass.  Painful and reduced LS ROM noted. Straight leg raise is negative. DTR's, motor strength and sensation normal, including heel and toe gait.  Peripheral pulses are palpable. X-Ray: not indicated.  Neck exam: normal C-spine, no tenderness, FROM without pain, normal neurological exam of arms; normal DTR's, motor, sensory exam.    ASSESSMENT:     ICD-10-CM    1. Chronic right-sided thoracic back pain M54.6 XR Thoracic Spine 2 Views    G89.29 cyclobenzaprine (FLEXERIL) 5 MG tablet     PHYSICAL THERAPY REFERRAL   2. Back muscle spasm M62.830 XR Thoracic Spine 2 Views     cyclobenzaprine (FLEXERIL) 5 MG tablet     PHYSICAL THERAPY REFERRAL         PLAN:  For acute pain, rest, intermittent application ice, analgesics and muscle relaxants are recommended. Discussed longer term treatment plan of prn NSAID's and back care exercise program. Proper lifting with avoidance of heavy lifting discussed. Consider Physical Therapy and XRay studies if not improving. Call or return to clinic prn if these symptoms worsen or fail to improve as anticipated.    Electronically signed:  Tg Orlando M.D.    "

## 2017-06-06 ENCOUNTER — HOSPITAL ENCOUNTER (OUTPATIENT)
Dept: PHYSICAL THERAPY | Facility: OTHER | Age: 47
Setting detail: THERAPIES SERIES
End: 2017-06-06
Attending: FAMILY MEDICINE
Payer: COMMERCIAL

## 2017-06-06 PROCEDURE — 97162 PT EVAL MOD COMPLEX 30 MIN: CPT | Mod: GP | Performed by: PHYSICAL THERAPIST

## 2017-06-06 PROCEDURE — 97110 THERAPEUTIC EXERCISES: CPT | Mod: GP | Performed by: PHYSICAL THERAPIST

## 2017-06-06 PROCEDURE — 40000718 ZZHC STATISTIC PT DEPARTMENT ORTHO VISIT: Performed by: PHYSICAL THERAPIST

## 2017-06-06 PROCEDURE — 97530 THERAPEUTIC ACTIVITIES: CPT | Mod: GP | Performed by: PHYSICAL THERAPIST

## 2017-06-08 NOTE — PROGRESS NOTES
06/06/17 1600   General Information   Type of Visit Initial OP Ortho PT Evaluation   Start of Care Date 06/06/17   Referring Physician Dr. Orlando    Patient/Family Goals Statement To find what    Orders Evaluate and Treat   Orders Comment None   Date of Order 05/15/17   Insurance Type Other   Insurance Comments/Visits Authorized No restrictions   Medical Diagnosis Chronic right sided thoracic back pain, back muscle spasm.    Surgical/Medical history reviewed Yes   Precautions/Limitations no known precautions/limitations   Weight-Bearing Status - LUE full weight-bearing   Weight-Bearing Status - RUE full weight-bearing   Weight-Bearing Status - LLE full weight-bearing   Weight-Bearing Status - RLE full weight-bearing   Body Part(s)   Body Part(s) Cervical Spine   Presentation and Etiology   Pertinent history of current problem (include personal factors and/or comorbidities that impact the POC) Started with a dull ache at the right lower rib cage in Aug/Sept. of last year, thought maybe a kidney stone. She then was doing a plank and her symptoms became significantly worse. She saw chiropractic 7 x since last Aug/Sept. along with massage and felt it was a temporary fix. She reports that otherwise she has been healthy    Impairments A. Pain;E. Decreased flexibility;D. Decreased ROM;N. Headaches   Functional Limitations perform activities of daily living;perform required work activities;perform desired leisure / sports activities   Symptom Location Neck, right shoulder, right lower rib cage, headaches to the occiput and forhead.   How/Where did it occur From insidious onset   Onset date of current episode/exacerbation 08/06/16  (Symptoms started last fall, worse this spring)   Chronicity New   Pain rating (0-10 point scale) Other   Pain rating comment The thorax is a 3/10 ave. and 9.5/10 at worst, neck pain is a 2/10 ave. and 6/10 at worst, headache are a 2/10 ave. and 4.5/10 at worst and the shoulder pain is a 2/10  ave. and 5.5/10 at worst.    Pain quality A. Sharp;B. Dull;C. Aching;F. Stabbing   Frequency of pain/symptoms D. Other   Pain frequency comment The thoracic pain is present 100% of the day, the neck pain is present 20% of the day, shoulder pain 0-20% of the day and headaches are present 2x a month.    Pain/symptoms are: The same all the time   Pain/symptoms exacerbated by G. Certain positions;J. ADL;K. Home tasks;L. Work tasks;C. Lifting;D. Carrying   Pain/symptoms eased by C. Rest   Progression of symptoms since onset: Worsened   Current / Previous Interventions   Diagnostic Tests: X-ray   X-ray Results Results   X-ray results Multiple levels of endplate osteophytes, worst at T11-12.   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    Current Level of Function   Current Community Support Family/friend caregiver   Patient role/employment history A. 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    [facility]   Home/community accessibility No concerns   Current equipment-Gait/Locomotion None   Current equipment-ADL None   Fall Risk Screen   Fall screen completed by PT   Per patient - Fall 2 or more times in past year? No   Per patient - Fall with injury in past year? No   Is patient a fall risk? No   Cervical Spine   Observation Pain behaviors demonstrates when trying to transfer from sit to stand.    Integumentary  No deficits noted   Posture Forward head in sitting and standing   Cervical Flexion ROM Limited by 25%   Cervical Extension ROM Limited by 50%   Cervical Right Side Bending ROM WNL   Cervical Left Side Bending ROM WNL   Cervical Right Rotation ROM WNL   Cervical Left Rotation ROM WNL   Cervical/Shoulder Special Tests Comments Directional  preference assessed using Static/Dynamic Force Analysis. Starting symptoms in sitting: Headache to the forhead 2.5/10. Seated protrusion moves the headache to the temple. Seated retraction 25%, no significant change, retraction 50% 3x10 abolishes the headache. Retraction while transferring from sit to stand also decreases the right lower thoracic pain.    Planned Therapy Interventions   Planned Therapy Interventions manual therapy;joint mobilization;neuromuscular re-education;ROM;strengthening;stretching   Planned Therapy Interventions Comment directional preference   Planned Modality Interventions   Planned Modality Interventions Comments Will use as needed for symptom control    Clinical Impression   Criteria for Skilled Therapeutic Interventions Met yes, treatment indicated   PT Diagnosis Mechanical cervical involvment with thoracic pain, headache pain and shoulder pain, postural strain, limited function.   Influenced by the following impairments Pain, decreased ROM.   Functional limitations due to impairments Transfers from sit to stand, rolling in bed, putting up with headaches.    Clinical Presentation Evolving/Changing   Clinical Presentation Rationale Clinical judgement, multiple body systems.    Clinical Decision Making (Complexity) Moderate complexity   Therapy Frequency 2 times/Week  (decreasing to 1 x a week and then every other week)   Predicted Duration of Therapy Intervention (days/wks) 8 visits   Risk & Benefits of therapy have been explained Yes   Patient, Family & other staff in agreement with plan of care Yes   Education Assessment   Preferred Learning Style Listening;Reading;Demonstration   Barriers to Learning No barriers   ORTHO GOALS   PT Ortho Eval Goals 1;2;3   Ortho Goal 1   Goal Identifier Transfers   Goal Description Sumaya will transfer from sit to stand without producing thoracic pain for 7 consecutive days   Target Date 07/06/17   Ortho Goal 2   Goal Identifier Headaches   Goal  Description Sumaya will report 30 days without a headache   Target Date 07/06/17   Ortho Goal 3   Goal Identifier Function   Goal Description Sumaya will return to full symptom free ROM and function   Target Date 08/05/17   Total Evaluation Time   Total Evaluation Time 25

## 2017-08-22 ENCOUNTER — OFFICE VISIT (OUTPATIENT)
Dept: PODIATRY | Facility: OTHER | Age: 47
End: 2017-08-22
Payer: COMMERCIAL

## 2017-08-22 VITALS — HEIGHT: 68 IN | BODY MASS INDEX: 30.92 KG/M2 | WEIGHT: 204 LBS | TEMPERATURE: 98 F

## 2017-08-22 DIAGNOSIS — L60.3 ONYCHODYSTROPHY: Primary | ICD-10-CM

## 2017-08-22 PROCEDURE — 11750 EXCISION NAIL&NAIL MATRIX: CPT | Mod: T5 | Performed by: PODIATRIST

## 2017-08-22 PROCEDURE — 99213 OFFICE O/P EST LOW 20 MIN: CPT | Mod: 25 | Performed by: PODIATRIST

## 2017-08-22 ASSESSMENT — PAIN SCALES - GENERAL: PAINLEVEL: NO PAIN (0)

## 2017-08-22 NOTE — NURSING NOTE
"Chief Complaint   Patient presents with     Procedure     Matrixectomy Right great toenail       Initial Temp 98  F (36.7  C) (Temporal)  Ht 5' 8\" (1.727 m)  Wt 204 lb (92.5 kg)  LMP 06/15/2015  BMI 31.02 kg/m2 Estimated body mass index is 31.02 kg/(m^2) as calculated from the following:    Height as of this encounter: 5' 8\" (1.727 m).    Weight as of this encounter: 204 lb (92.5 kg).  BP completed using cuff size: NA (Not Taken)  Medication Reconciliation: complete    Emma Randall CMA, August 22, 2017    "

## 2017-08-22 NOTE — PATIENT INSTRUCTIONS
INGROWN TOENAIL POSTOPERATIVE INSTRUCTIONS   (Nail avulsion or chemical matrixectomy)   1.  Go directly home and elevate the affected foot on one or two pillows for the remainder of the day & evening. Your toe may stay numb for 2-8 hours.   2.  Take Tylenol, ibuprofen or another anti-inflammatory as needed for pain.   3.  Use oral antibiotic if that was prescribed at your doctor visit. Take the entire prescription even if your symptoms have improved.   4.  Keep dressing dry and intact the day of the procedure. The morning after the procedure, remove entire dressing and soak or wash the affected area in lukewarm water for 5-10 minutes.  You may add Epsom salt to soothe the area and help it become drier. Do this twice a day or more until the surgical site remains dry without drainage.  This may take 1-2 weeks if a small part of your nail was removed or 4-8 weeks if the entire nail was removed. You may count showering or bathing as one soak.  After each soak, pat the area dry with a clean towel or gauze and then allow to air dry for a few minutes. You may apply topical antibiotics, 3-4 drops of Cortisporin if it was prescribed at your visit or apply a very small amount of ointment like Bacitracin or Neosporin to the area.  Then cover with a cloth or fabric bandaid.    5.  You may walk and pursue everyday activities as tolerated with either an open toe shoe or cut-out shoe as needed. You may wear regular roomy shoes if no pain is noted.  No swimming in the lake, river, pool or hot tub until the wound has been dry for 3 days.   7.  Watch for any signs or symptoms of infection such as: red streaks going up the foot/leg, swelling, pus or foul odor. For patients that have had a phenol procedure, the toe will drain longer and may look similar to infection because it is a chemical burn.  Please call with questions.  8.  Follow up in my office in 1-2 weeks if the surgical site has not become dry or if other complications  occur.  9.  There is 5-10% chance of complications such as infection or formation of another nail or a thick scared nail.

## 2017-08-22 NOTE — MR AVS SNAPSHOT
After Visit Summary   8/22/2017    Sumaya Gatica    MRN: [id number]           Patient Information     Date Of Birth          1970        Visit Information        Provider Department      8/22/2017 3:00 PM Pino Espinosa DPM [facility]        Today's Diagnoses     Onychodystrophy    -  1      Care Instructions    INGROWN TOENAIL POSTOPERATIVE INSTRUCTIONS   (Nail avulsion or chemical matrixectomy)   1.  Go directly home and elevate the affected foot on one or two pillows for the remainder of the day & evening. Your toe may stay numb for 2-8 hours.   2.  Take Tylenol, ibuprofen or another anti-inflammatory as needed for pain.   3.  Use oral antibiotic if that was prescribed at your doctor visit. Take the entire prescription even if your symptoms have improved.   4.  Keep dressing dry and intact the day of the procedure. The morning after the procedure, remove entire dressing and soak or wash the affected area in lukewarm water for 5-10 minutes.  You may add Epsom salt to soothe the area and help it become drier. Do this twice a day or more until the surgical site remains dry without drainage.  This may take 1-2 weeks if a small part of your nail was removed or 4-8 weeks if the entire nail was removed. You may count showering or bathing as one soak.  After each soak, pat the area dry with a clean towel or gauze and then allow to air dry for a few minutes. You may apply topical antibiotics, 3-4 drops of Cortisporin if it was prescribed at your visit or apply a very small amount of ointment like Bacitracin or Neosporin to the area.  Then cover with a cloth or fabric bandaid.    5.  You may walk and pursue everyday activities as tolerated with either an open toe shoe or cut-out shoe as needed. You may wear regular roomy shoes if no pain is noted.  No swimming in the lake, river, pool or hot tub until the wound has been dry for 3 days.   7.  Watch for any signs or symptoms of  "infection such as: red streaks going up the foot/leg, swelling, pus or foul odor. For patients that have had a phenol procedure, the toe will drain longer and may look similar to infection because it is a chemical burn.  Please call with questions.  8.  Follow up in my office in 1-2 weeks if the surgical site has not become dry or if other complications occur.  9.  There is 5-10% chance of complications such as infection or formation of another nail or a thick scared nail.            Follow-ups after your visit        Who to contact     If you have questions or need follow up information about today's clinic visit or your schedule please [facility] at [phone].  Normal or non-critical lab and imaging results will be communicated to you by WebNoteshart, letter or phone within 4 business days after the clinic has received the results. If you do not hear from us within 7 days, please contact the clinic through WebNoteshart or phone. If you have a critical or abnormal lab result, we will notify you by phone as soon as possible.  Submit refill requests through [employer] or call your pharmacy and they will forward the refill request to us. Please allow 3 business days for your refill to be completed.          Additional Information About Your Visit        WebNotesharReFlow Medical Information     [employer] gives you secure access to your electronic health record. If you see a primary care provider, you can also send messages to your care team and make appointments. If you have questions, please call your primary care clinic.  If you do not have a primary care provider, please call [phone] and they will assist you.        Care EveryWhere ID     This is your Care EveryWhere ID. This could be used by other organizations to access your [facility] medical records  [id number]        Your Vitals Were     Temperature Height Last Period BMI (Body Mass Index)          98  F (36.7  C) (Temporal) 5' 8\" (1.727 m) 06/15/2015 " 31.02 kg/m2         Blood Pressure from Last 3 Encounters:   05/15/17 114/72   04/06/17 108/76   02/27/17 106/64    Weight from Last 3 Encounters:   08/22/17 204 lb (92.5 kg)   05/15/17 201 lb (91.2 kg)   02/27/17 201 lb (91.2 kg)              We Performed the Following     REMOVAL NAIL/NAIL BED, PARTIAL OR COMPLETE        Primary Care Provider Office Phone # Fax #    Tiffanie Montes PA-C [phone] [address] [place] [address]        Equal Access to Services     [facility]: Hadii stoney hogan hadasho Sodeven, waaxda luqadaha, qaybta kaalmada adesveta, donny hernandez . So [facility] [phone].    ATENCIÓN: Si habla español, tiene a vargas disposición servicios gratuitos de asistencia lingüística. [facility] [phone].    We comply with applicable federal civil rights laws and [place] laws. We do not discriminate on the basis of race, color, national origin, age, disability sex, sexual orientation or gender identity.            Thank you!     Thank you for choosing [facility]  for your care. Our goal is always to provide you with excellent care. Hearing back from our patients is one way we can continue to improve our services. Please take a few minutes to complete the written survey that you may receive in the mail after your visit with us. Thank you!             Your Updated Medication List - Protect others around you: Learn how to safely use, store and throw away your medicines at www.disposemymeds.org.          This list is accurate as of: 8/22/17  3:35 PM.  Always use your most recent med list.                   Brand Name Dispense Instructions for use Diagnosis    * buPROPion 150 MG 24 hr tablet    WELLBUTRIN XL    30 tablet    Take 1 tablet (150 mg) by mouth every morning    Anxiety, Difficulty concentrating       * buPROPion 300 MG 24 hr tablet    WELLBUTRIN XL    30 tablet    Take 1 tablet (300 mg) by mouth every morning    Anxiety, Difficulty  concentrating       cyclobenzaprine 5 MG tablet    FLEXERIL    30 tablet    Take 1 tablet (5 mg) by mouth 2 times daily as needed for muscle spasms    Chronic right-sided thoracic back pain, Back muscle spasm       escitalopram 20 MG tablet    LEXAPRO    90 tablet    Take 1 tablet (20 mg) by mouth daily    Anxiety       ibuprofen 200 MG tablet    ADVIL/MOTRIN     Take 400 mg by mouth every 4 hours as needed Reported on 2/27/2017        SUMAtriptan 50 MG tablet    IMITREX    12 tablet    Take 1 tablet (50 mg) by mouth See Admin Instructions WITH ONSET OF MIGRAINE, MAY REPEAT ONCE AFTER 2 HOURS. DO NOT EXCEED 4 TABLETS IN 24 HOURS.    Migraine without aura and without status migrainosus, not intractable       * Notice:  This list has 2 medication(s) that are the same as other medications prescribed for you. Read the directions carefully, and ask your doctor or other care provider to review them with you.

## 2017-09-04 DIAGNOSIS — F41.9 ANXIETY: ICD-10-CM

## 2017-09-05 RX ORDER — ESCITALOPRAM OXALATE 20 MG/1
TABLET ORAL
Qty: 90 TABLET | Refills: 2 | Status: SHIPPED | OUTPATIENT
Start: 2017-09-05 | End: 2018-11-02

## 2017-09-05 NOTE — TELEPHONE ENCOUNTER
Prescription approved per [facility] Refill Protocol.  Josephine Saldana, RN, BSN

## 2017-09-05 NOTE — TELEPHONE ENCOUNTER
lexapro     Last Written Prescription Date: 02/27/17  Last Fill Quantity: 90, # refills: 1  Last Office Visit with [facility] primary care provider:  05/15/17   Next 5 appointments (look out 90 days)     Sep 12, 2017  4:00 PM CDT   [facility] with Pino [facility]osa DPM   [facility]    [address] [facility] [address] [phone]                   Last PHQ-9 score on record=   PHQ-9 SCORE 2/27/2017   Total Score -   Total Score MyChart -   Total Score 3

## 2017-09-08 ENCOUNTER — HEALTH MAINTENANCE LETTER (OUTPATIENT)
Age: 47
End: 2017-09-08

## 2017-09-27 ENCOUNTER — MYC MEDICAL ADVICE (OUTPATIENT)
Dept: FAMILY MEDICINE | Facility: OTHER | Age: 47
End: 2017-09-27

## 2017-09-28 ENCOUNTER — E-VISIT (OUTPATIENT)
Dept: FAMILY MEDICINE | Facility: OTHER | Age: 47
End: 2017-09-28
Payer: COMMERCIAL

## 2017-09-28 DIAGNOSIS — M54.6 RIGHT-SIDED THORACIC BACK PAIN, UNSPECIFIED CHRONICITY: Primary | ICD-10-CM

## 2017-09-28 PROCEDURE — 99444 ZZC PHYSICIAN ONLINE EVALUATION & MANAGEMENT SERVICE: CPT | Performed by: PHYSICIAN ASSISTANT

## 2017-10-01 ENCOUNTER — OFFICE VISIT (OUTPATIENT)
Dept: URGENT CARE | Facility: RETAIL CLINIC | Age: 47
End: 2017-10-01
Payer: COMMERCIAL

## 2017-10-01 VITALS — DIASTOLIC BLOOD PRESSURE: 77 MMHG | SYSTOLIC BLOOD PRESSURE: 119 MMHG | HEART RATE: 93 BPM | TEMPERATURE: 98.6 F

## 2017-10-01 DIAGNOSIS — L08.9 SKIN INFLAMMATION: ICD-10-CM

## 2017-10-01 DIAGNOSIS — T63.444A BEE STING REACTION, UNDETERMINED INTENT, INITIAL ENCOUNTER: Primary | ICD-10-CM

## 2017-10-01 PROCEDURE — 99213 OFFICE O/P EST LOW 20 MIN: CPT | Performed by: PHYSICIAN ASSISTANT

## 2017-10-01 RX ORDER — PREDNISONE 10 MG/1
TABLET ORAL
Qty: 15 TABLET | Refills: 0 | Status: SHIPPED | OUTPATIENT
Start: 2017-10-01 | End: 2017-10-05

## 2017-10-01 NOTE — PATIENT INSTRUCTIONS
Large local reactions to insect bites typically consist of an itchy or even painful area of redness, warmth, swelling and/or induration  Large local reactions develop within hours of the bite, progress over 8 to 24 hrs or more, and resolve within 3 to 10 days.  Take Prednisone taper with food  Zyrtec generic twice a day for 3-5 day or zyrtec generic in morning and benadryl as needed  Cool compresses/ice packs  Watch for any high fevers, open areas of skin, pus/discharge presence. If these occur, be seen for evaluation in urgent care or ER.  Please follow up with primary care provider if not improving, worsening or new symptoms or for any adverse reactions to medications.

## 2017-10-01 NOTE — MR AVS SNAPSHOT
After Visit Summary   10/1/2017    Sumaya Gatica    MRN: [id number]           Patient Information     Date Of Birth          1970        Visit Information        Provider Department      10/1/2017 2:40 PM Christy Gutiérrez PA-C [facility]        Today's Diagnoses     Bee sting reaction, undetermined intent, initial encounter    -  1    large localized reaction          Care Instructions    Large local reactions to insect bites typically consist of an itchy or even painful area of redness, warmth, swelling and/or induration  Large local reactions develop within hours of the bite, progress over 8 to 24 hrs or more, and resolve within 3 to 10 days.  Take Prednisone taper with food  Zyrtec generic twice a day for 3-5 day or zyrtec generic in morning and benadryl as needed  Cool compresses/ice packs  Watch for any high fevers, open areas of skin, pus/discharge presence. If these occur, be seen for evaluation in urgent care or ER.  Please follow up with primary care provider if not improving, worsening or new symptoms or for any adverse reactions to medications.              Follow-ups after your visit        Who to contact     You can reach your care team any time of the day by calling [phone].  Notification of test results:  If you have an abnormal lab result, we will notify you by phone as soon as possible.         Additional Information About Your Visit        MyChart Information     [employer] gives you secure access to your electronic health record. If you see a primary care provider, you can also send messages to your care team and make appointments. If you have questions, please call your primary care clinic.  If you do not have a primary care provider, please call [phone] and they will assist you.        Care EveryWhere ID     This is your Care EveryWhere ID. This could be used by other organizations to access your [facility] medical records  [id number]         Your Vitals Were     Pulse Temperature Last Period             93 98.6  F (37  C) (Oral) 06/15/2015          Blood Pressure from Last 3 Encounters:   10/01/17 119/77   05/15/17 114/72   04/06/17 108/76    Weight from Last 3 Encounters:   08/22/17 204 lb (92.5 kg)   05/15/17 201 lb (91.2 kg)   02/27/17 201 lb (91.2 kg)              Today, you had the following     No orders found for display         Today's Medication Changes          These changes are accurate as of: 10/1/17  3:09 PM.  If you have any questions, ask your nurse or doctor.               Start taking these medicines.        Dose/Directions    predniSONE 10 MG tablet   Commonly known as:  DELTASONE   Used for:  Bee sting reaction, undetermined intent, initial encounter, Skin inflammation        Take 5 tabs once day 1, 4 tabs once day 2, 3 tabs once day 3, 2 tabs once day 4, 1 tab once day 5   Quantity:  15 tablet   Refills:  0         These medicines have changed or have updated prescriptions.        Dose/Directions    buPROPion 150 MG 24 hr tablet   Commonly known as:  WELLBUTRIN XL   This may have changed:  Another medication with the same name was removed. Continue taking this medication, and follow the directions you see here.   Used for:  Anxiety, Difficulty concentrating        Dose:  150 mg   Take 1 tablet (150 mg) by mouth every morning   Quantity:  30 tablet   Refills:  1         Stop taking these medicines if you haven't already. Please contact your care team if you have questions.     cyclobenzaprine 5 MG tablet   Commonly known as:  FLEXERIL                Where to get your medicines      These medications were sent to [id number] - [place], [place] - [address]  [address] [address] [address]     Phone:  [phone]     predniSONE 10 MG tablet                Primary Care Provider Office Phone # Fax #    Tiffanie Montes PA-C [phone] [address] [place] [address]        Equal Access to  Services     [facility]: Hadii stoney hogan tundered Sodaniaali, waaxda luqadaha, qaybta kaalmada chas, donny hernandez . So [facility] [phone].    ATENCIÓN: Si melany brooks, tiene a vargas disposición servicios gratuitos de asistencia lingüística. Llame al [phone].    We comply with applicable federal civil rights laws and [place] laws. We do not discriminate on the basis of race, color, national origin, age, disability, sex, sexual orientation, or gender identity.            Thank you!     Thank you for choosing [facility]  for your care. Our goal is always to provide you with excellent care. Hearing back from our patients is one way we can continue to improve our services. Please take a few minutes to complete the written survey that you may receive in the mail after your visit with us. Thank you!             Your Updated Medication List - Protect others around you: Learn how to safely use, store and throw away your medicines at www.disposemymeds.org.          This list is accurate as of: 10/1/17  3:09 PM.  Always use your most recent med list.                   Brand Name Dispense Instructions for use Diagnosis    buPROPion 150 MG 24 hr tablet    WELLBUTRIN XL    30 tablet    Take 1 tablet (150 mg) by mouth every morning    Anxiety, Difficulty concentrating       escitalopram 20 MG tablet    LEXAPRO    90 tablet    TAKE ONE TABLET BY MOUTH DAILY    Anxiety       ibuprofen 200 MG tablet    ADVIL/MOTRIN     Take 400 mg by mouth every 4 hours as needed Reported on 2/27/2017        predniSONE 10 MG tablet    DELTASONE    15 tablet    Take 5 tabs once day 1, 4 tabs once day 2, 3 tabs once day 3, 2 tabs once day 4, 1 tab once day 5    Bee sting reaction, undetermined intent, initial encounter, Skin inflammation       SUMAtriptan 50 MG tablet    IMITREX    12 tablet    Take 1 tablet (50 mg) by mouth See Admin Instructions WITH ONSET OF MIGRAINE, MAY REPEAT ONCE AFTER 2  HOURS. DO NOT EXCEED 4 TABLETS IN 24 HOURS.    Migraine without aura and without status migrainosus, not intractable

## 2017-10-01 NOTE — PROGRESS NOTES
Chief Complaint   Patient presents with     Insect Bites     bee sting; 1 day ago; Left arm swelling, spot on Right arm; feels hot, more pain than itch      SUBJECTIVE:  Sumaya Gatica is a 47 year old female who presents to the clinic today for a rash.  Onset of rash was yesterday  Rash is worsening.  Location of the rash: left upper arm  Quality/symptoms of rash: left arm swollen  Symptoms are moderate and rash seems to be worsening.  Previous history of a similar rash? No  Recent exposure history: bee stings - several stings, was staining her porch, nest under the area and she was stung several times on her her left upper arm, 1 small sting on R arm. Since then her left arm has gotten, red swollen, tight - large reaction  Associated symptoms include: nothing.  No respiratory symptoms    Past Medical History:   Diagnosis Date     Abnormal Pap smear, can't excl hi gd sq intraepithelial lesion (ASC-H) 3/22/07    negative colposcopy     History of colposcopy with cervical biopsy 4/23/07     Ovarian cysts      Rosacea      Meds- lexapro     Allergies   Allergen Reactions     No Known Drug Allergies      Seasonal Allergies         History   Smoking Status     Never Smoker   Smokeless Tobacco     Never Used       ROS:  CONSTITUTIONAL:NEGATIVE for fever, chills  INTEGUMENTARY/SKIN: redness, large reaction on left upper arm, recent bee sting  RESP:NEGATIVE for significant cough or wheezing    EXAM:   /77 (BP Location: Right arm)  Pulse 93  Temp 98.6  F (37  C) (Oral)  LMP 06/15/2015  GENERAL: alert, no acute distress.  SKIN: Rash description:    Distribution: generalized  Location: left upper arm  Color: red painful swelling, no abscess or fluctuance, areas of stings indentified, no stingers seen - appearance of large localized reaction, no lymphangitis, full ROM, pulses intact    ASSESSMENT:  (T63.444A) Bee sting reaction, undetermined intent, initial encounter   (L08.9) large localized  reaction      PLAN:  Plan: predniSONE (DELTASONE) 10 MG tablet - 5 day taper starting at 50mg  Take Prednisone taper with food  Zyrtec generic twice a day for 3-5 day or zyrtec generic in morning and benadryl as needed  Cool compresses/ice packs  Watch for any high fevers, open areas of skin, pus/discharge presence. If these occur, be seen for evaluation in urgent care or ER.  Please follow up with primary care provider if not improving, worsening or new symptoms or for any adverse reactions to medications.    Christy Gutiérrez PA-C  [facility]

## 2017-10-01 NOTE — NURSING NOTE
"Chief Complaint   Patient presents with     Insect Bites     bee sting; 1 day ago; Left arm swelling, spot on Right arm; feels hot, more pain than itch       Initial /77 (BP Location: Right arm)  Pulse 93  Temp 98.6  F (37  C) (Oral)  LMP 06/15/2015 Estimated body mass index is 31.02 kg/(m^2) as calculated from the following:    Height as of 8/22/17: 5' 8\" (1.727 m).    Weight as of 8/22/17: 204 lb (92.5 kg).  Medication Reconciliation: complete  "

## 2017-10-04 ENCOUNTER — HOSPITAL ENCOUNTER (OUTPATIENT)
Dept: MRI IMAGING | Facility: CLINIC | Age: 47
Discharge: HOME | End: 2017-10-04
Attending: PHYSICIAN ASSISTANT | Admitting: PHYSICIAN ASSISTANT
Payer: COMMERCIAL

## 2017-10-04 DIAGNOSIS — M54.6 RIGHT-SIDED THORACIC BACK PAIN, UNSPECIFIED CHRONICITY: ICD-10-CM

## 2017-10-04 PROCEDURE — 72146 MRI CHEST SPINE W/O DYE: CPT

## 2017-10-09 ENCOUNTER — MYC MEDICAL ADVICE (OUTPATIENT)
Dept: FAMILY MEDICINE | Facility: OTHER | Age: 47
End: 2017-10-09

## 2017-10-09 ENCOUNTER — TELEPHONE (OUTPATIENT)
Dept: FAMILY MEDICINE | Facility: OTHER | Age: 47
End: 2017-10-09

## 2017-10-09 NOTE — TELEPHONE ENCOUNTER
Patient calling to see what we can do for appt times for her since she has a in home day care and cannot get a SUB in short notice. Patient called clinic to set up an appt to follow up from her MRI but the times just are not working for her. She is hoping to be worked in 3 pm or later is good OR 4 pm or later is ideal .    Claudia Arauz  Reception/ Scheduling

## 2017-10-09 NOTE — TELEPHONE ENCOUNTER
Left message for pt to return call, when call is returned give information below and help schedule appt.    Huddled with provider and Tiffanie stated she can work her in on 10/30/17 at 315 or 330 (please schedule as long even though will be an over book), will not be able to see her earlier than that. She can see any of the other providers at the clinic if she wants to be seen earlier.      Jody Powers CMA ([facility])

## 2017-10-10 NOTE — TELEPHONE ENCOUNTER
She states she has another appt that day that she waited 2 months for.  She request I ask if there is anything else before scheduling with someone else.

## 2017-10-11 NOTE — PROGRESS NOTES
SUBJECTIVE:                                                    Sumaya Gatica is a 47 year old female who presents to clinic today for the following health issues:      HPI     Patient is here to discuss MRI results. MRI didn't show any skeletal issue, so would like to look more into an ultrasound or something to look to make sure that there isn't something else wrong. Saw Dr. Orlando in the spring was referred to PT, tried PT and chiropractor, has not been better. Had an e-visit with Carolina on 9/28/17.    Answers for HPI/ROS submitted by the patient on 10/13/2017   If you checked off any problems, how difficult have these problems made it for you to do your work, take care of things at home, or get along with other people?: Somewhat difficult  PHQ9 TOTAL SCORE: 10  GEOVANY 7 TOTAL SCORE: 7    One year ago, started like a kidney stone, okay for 4-5 months, then went on vacation it started hurting again  Chiropractor, 3 weeks, 3 times per week, chiropractor recommended seeing medicine for evaluation as he felt he was not getting anywhere with the pain.   Went through physical therapy - exercises did not help  Under rib cage on the right side  Planking - thought broke a rib, sharp pain then dull after that  Unable to exercise since then due to pain in right side  Aleve, ibuprofen help but do not completely relieve pain.  Worsens - sitting up from lying, anything that causes abdominal wall to move   Seeing a  - to see if there are any recommendations for treatment.   Hysterectomy - still has ovaries  Colonoscopy 2012 - normal, no changes in stools or urinating habits      Problem list and histories reviewed & adjusted, as indicated.  Additional history: as documented    Labs reviewed in EPIC    ROS:  Constitutional, HEENT, cardiovascular, pulmonary, gi and gu systems are negative, except as otherwise noted.      OBJECTIVE:   /80 (BP Location: Left arm, Patient Position: Sitting, Cuff Size: Adult  "Regular)  Pulse 82  Temp 99.5  F (37.5  C) (Temporal)  Resp 16  Ht 5' 7.99\" (1.727 m)  Wt 207 lb 3.2 oz (94 kg)  LMP 06/15/2015  SpO2 97%  BMI 31.51 kg/m2  Body mass index is 31.51 kg/(m^2).  GENERAL: healthy, alert and no distress  EYES: Eyes grossly normal to inspection, PERRL and conjunctivae and sclerae normal  HENT: ear canals and TM's normal, nose and mouth without ulcers or lesions  NECK: no adenopathy, no asymmetry, masses, or scars and thyroid normal to palpation  RESP: lungs clear to auscultation - no rales, rhonchi or wheezes  CV: regular rate and rhythm, normal S1 S2, no S3 or S4, no murmur, click or rub, no peripheral edema and peripheral pulses strong  ABDOMEN: soft, tender to palpation along RUQ but negative Foley's sign, no hepatosplenomegaly, no masses and bowel sounds normal  MS: no gross musculoskeletal defects noted, no edema  SKIN: no suspicious lesions or rashes  NEURO: Normal strength and tone, mentation intact and speech normal  BACK: no CVA tenderness, no paralumbar tenderness  Comprehensive back pain exam:  Tenderness of right rib cage, paraspinal muscles at T10-T12, RUQ abdomenn, Pain limits the following motions: lying down and sitting up from lying position, Lower extremity strength functional and equal on both sides, Lower extremity reflexes within normal limits bilaterally, Lower extremity sensation normal and equal on both sides and Straight leg raise negative bilaterally  PSYCH: mentation appears normal, affect normal/bright    Diagnostic Test Results:  MRI thoracic spine  IMPRESSION:    1. Degenerative changes at T11-T12 with loss of the anterior disc  space and focal kyphotic curvature of the thoracic spine as described  above. No significant spinal canal or neural foraminal narrowing.  2. Partially visualized degenerative changes in the cervical spine.        SHEELA BUNDY MD    ASSESSMENT/PLAN:     1. Chronic right-sided thoracic back pain  I initially ordered an MRI of " "the abdomen as patient has had 2 CT scans of the abdomen and wanted to limit radiation exposure and see if there were better images we could obtain with MRI to see if we were missing something. However, as I was reviewing her thoracic spine MRI, I was compelled to consider her symptoms may be due to radiculopathy that was not captured due to positioning on her back during the test. Patient does  and is often lifting and carrying objects and children which in theory could compress the nerve with the added weight and positioning of her body. I consulted with Dr. Guan in radiology and discussed this scenario. He reviewed the MRI and found that there is some right foraminal stenosis at T10 which would correlate with the nerve root where she is experiencing symptoms. She could have chronic nerve irritation causing her symptoms and potentially some referred internal organ pain along this distribution causing the sensation of \"deep\" internal pain. I recommended physical therapy for strengthening and stretching targeted towards this theory. I also recommended she see Dr. Mosher specifically to address this as a potential cause and discuss further treatment options should physical therapy exacerbate or fail to improve symptoms. Patient agrees with plan. Cancel abdominal MRI.  - NEUROSURGERY REFERRAL  - PHYSICAL THERAPY REFERRAL    2. Right flank pain  UA and blood work all were normal  - *UA reflex to Microscopic and Culture ([facility] (except [place] and [place])  - Comprehensive metabolic panel (BMP + Alb, Alk Phos, ALT, AST, Total. Bili, TP)  - CBC with platelets and differential  - Lipase  - Amylase    3. Radiculopathy, thoracic region  - NEUROSURGERY REFERRAL  - PHYSICAL THERAPY REFERRAL    Greater than 50% of 35 minute visit were spent on counseling or coordination of care regarding chronic mid right back, RUQ and rib pain etiology and treatment plan.    Mireille Woodruff, APRN " [facility]

## 2017-10-13 ENCOUNTER — OFFICE VISIT (OUTPATIENT)
Dept: FAMILY MEDICINE | Facility: OTHER | Age: 47
End: 2017-10-13
Payer: COMMERCIAL

## 2017-10-13 VITALS
TEMPERATURE: 99.5 F | HEART RATE: 82 BPM | DIASTOLIC BLOOD PRESSURE: 80 MMHG | BODY MASS INDEX: 31.4 KG/M2 | SYSTOLIC BLOOD PRESSURE: 122 MMHG | HEIGHT: 68 IN | WEIGHT: 207.2 LBS | OXYGEN SATURATION: 97 % | RESPIRATION RATE: 16 BRPM

## 2017-10-13 DIAGNOSIS — G89.29 CHRONIC RIGHT-SIDED THORACIC BACK PAIN: Primary | ICD-10-CM

## 2017-10-13 DIAGNOSIS — M54.14 RADICULOPATHY, THORACIC REGION: ICD-10-CM

## 2017-10-13 DIAGNOSIS — R10.9 RIGHT FLANK PAIN: ICD-10-CM

## 2017-10-13 DIAGNOSIS — M54.6 CHRONIC RIGHT-SIDED THORACIC BACK PAIN: Primary | ICD-10-CM

## 2017-10-13 LAB
ALBUMIN SERPL-MCNC: 3.8 G/DL (ref 3.4–5)
ALBUMIN UR-MCNC: NEGATIVE MG/DL
ALP SERPL-CCNC: 73 U/L (ref 40–150)
ALT SERPL W P-5'-P-CCNC: 29 U/L (ref 0–50)
AMYLASE SERPL-CCNC: 43 U/L (ref 30–110)
ANION GAP SERPL CALCULATED.3IONS-SCNC: 9 MMOL/L (ref 3–14)
APPEARANCE UR: CLEAR
AST SERPL W P-5'-P-CCNC: 15 U/L (ref 0–45)
BASOPHILS # BLD AUTO: 0 10E9/L (ref 0–0.2)
BASOPHILS NFR BLD AUTO: 0.2 %
BILIRUB SERPL-MCNC: 0.4 MG/DL (ref 0.2–1.3)
BILIRUB UR QL STRIP: NEGATIVE
BUN SERPL-MCNC: 15 MG/DL (ref 7–30)
CALCIUM SERPL-MCNC: 8.7 MG/DL (ref 8.5–10.1)
CHLORIDE SERPL-SCNC: 105 MMOL/L (ref 94–109)
CO2 SERPL-SCNC: 28 MMOL/L (ref 20–32)
COLOR UR AUTO: YELLOW
CREAT SERPL-MCNC: 0.81 MG/DL (ref 0.52–1.04)
DIFFERENTIAL METHOD BLD: NORMAL
EOSINOPHIL # BLD AUTO: 0.1 10E9/L (ref 0–0.7)
EOSINOPHIL NFR BLD AUTO: 1.5 %
ERYTHROCYTE [DISTWIDTH] IN BLOOD BY AUTOMATED COUNT: 12.2 % (ref 10–15)
GFR SERPL CREATININE-BSD FRML MDRD: 75 ML/MIN/1.7M2
GLUCOSE SERPL-MCNC: 88 MG/DL (ref 70–99)
GLUCOSE UR STRIP-MCNC: NEGATIVE MG/DL
HCT VFR BLD AUTO: 40.4 % (ref 35–47)
HGB BLD-MCNC: 13.8 G/DL (ref 11.7–15.7)
HGB UR QL STRIP: NEGATIVE
KETONES UR STRIP-MCNC: NEGATIVE MG/DL
LEUKOCYTE ESTERASE UR QL STRIP: NEGATIVE
LIPASE SERPL-CCNC: 206 U/L (ref 73–393)
LYMPHOCYTES # BLD AUTO: 3.1 10E9/L (ref 0.8–5.3)
LYMPHOCYTES NFR BLD AUTO: 38.3 %
MCH RBC QN AUTO: 31.2 PG (ref 26.5–33)
MCHC RBC AUTO-ENTMCNC: 34.2 G/DL (ref 31.5–36.5)
MCV RBC AUTO: 91 FL (ref 78–100)
MONOCYTES # BLD AUTO: 0.6 10E9/L (ref 0–1.3)
MONOCYTES NFR BLD AUTO: 7 %
NEUTROPHILS # BLD AUTO: 4.4 10E9/L (ref 1.6–8.3)
NEUTROPHILS NFR BLD AUTO: 53 %
NITRATE UR QL: NEGATIVE
PH UR STRIP: 6 PH (ref 5–7)
PLATELET # BLD AUTO: 273 10E9/L (ref 150–450)
POTASSIUM SERPL-SCNC: 4.3 MMOL/L (ref 3.4–5.3)
PROT SERPL-MCNC: 7.4 G/DL (ref 6.8–8.8)
RBC # BLD AUTO: 4.42 10E12/L (ref 3.8–5.2)
SODIUM SERPL-SCNC: 142 MMOL/L (ref 133–144)
SOURCE: NORMAL
SP GR UR STRIP: 1.01 (ref 1–1.03)
UROBILINOGEN UR STRIP-ACNC: 0.2 EU/DL (ref 0.2–1)
WBC # BLD AUTO: 8.2 10E9/L (ref 4–11)

## 2017-10-13 PROCEDURE — 81003 URINALYSIS AUTO W/O SCOPE: CPT | Performed by: STUDENT IN AN ORGANIZED HEALTH CARE EDUCATION/TRAINING PROGRAM

## 2017-10-13 PROCEDURE — 80053 COMPREHEN METABOLIC PANEL: CPT | Performed by: STUDENT IN AN ORGANIZED HEALTH CARE EDUCATION/TRAINING PROGRAM

## 2017-10-13 PROCEDURE — 85025 COMPLETE CBC W/AUTO DIFF WBC: CPT | Performed by: STUDENT IN AN ORGANIZED HEALTH CARE EDUCATION/TRAINING PROGRAM

## 2017-10-13 PROCEDURE — 99214 OFFICE O/P EST MOD 30 MIN: CPT | Performed by: STUDENT IN AN ORGANIZED HEALTH CARE EDUCATION/TRAINING PROGRAM

## 2017-10-13 PROCEDURE — 36415 COLL VENOUS BLD VENIPUNCTURE: CPT | Performed by: STUDENT IN AN ORGANIZED HEALTH CARE EDUCATION/TRAINING PROGRAM

## 2017-10-13 PROCEDURE — 82150 ASSAY OF AMYLASE: CPT | Performed by: STUDENT IN AN ORGANIZED HEALTH CARE EDUCATION/TRAINING PROGRAM

## 2017-10-13 PROCEDURE — 83690 ASSAY OF LIPASE: CPT | Performed by: STUDENT IN AN ORGANIZED HEALTH CARE EDUCATION/TRAINING PROGRAM

## 2017-10-13 ASSESSMENT — ANXIETY QUESTIONNAIRES
GAD7 TOTAL SCORE: 7
1. FEELING NERVOUS, ANXIOUS, OR ON EDGE: SEVERAL DAYS
5. BEING SO RESTLESS THAT IT IS HARD TO SIT STILL: SEVERAL DAYS
4. TROUBLE RELAXING: SEVERAL DAYS
GAD7 TOTAL SCORE: 7
3. WORRYING TOO MUCH ABOUT DIFFERENT THINGS: SEVERAL DAYS
7. FEELING AFRAID AS IF SOMETHING AWFUL MIGHT HAPPEN: SEVERAL DAYS
GAD7 TOTAL SCORE: 7
7. FEELING AFRAID AS IF SOMETHING AWFUL MIGHT HAPPEN: SEVERAL DAYS
6. BECOMING EASILY ANNOYED OR IRRITABLE: SEVERAL DAYS
2. NOT BEING ABLE TO STOP OR CONTROL WORRYING: SEVERAL DAYS

## 2017-10-13 ASSESSMENT — PAIN SCALES - GENERAL: PAINLEVEL: MILD PAIN (2)

## 2017-10-13 ASSESSMENT — PATIENT HEALTH QUESTIONNAIRE - PHQ9
SUM OF ALL RESPONSES TO PHQ QUESTIONS 1-9: 10
10. IF YOU CHECKED OFF ANY PROBLEMS, HOW DIFFICULT HAVE THESE PROBLEMS MADE IT FOR YOU TO DO YOUR WORK, TAKE CARE OF THINGS AT HOME, OR GET ALONG WITH OTHER PEOPLE: SOMEWHAT DIFFICULT
SUM OF ALL RESPONSES TO PHQ QUESTIONS 1-9: 10

## 2017-10-13 NOTE — NURSING NOTE
"Chief Complaint   Patient presents with     MRI Results     Panel Management     Flu shot, Pap, phq, cayetano       Initial /80 (BP Location: Left arm, Patient Position: Sitting, Cuff Size: Adult Regular)  Pulse 82  Temp 99.5  F (37.5  C) (Temporal)  Resp 16  Ht 5' 7.99\" (1.727 m)  Wt 207 lb 3.2 oz (94 kg)  LMP 06/15/2015  SpO2 97%  BMI 31.51 kg/m2 Estimated body mass index is 31.51 kg/(m^2) as calculated from the following:    Height as of this encounter: 5' 7.99\" (1.727 m).    Weight as of this encounter: 207 lb 3.2 oz (94 kg).  Medication Reconciliation: complete   Alexandria Henry CMA      "

## 2017-10-13 NOTE — MR AVS SNAPSHOT
"              After Visit Summary   10/13/2017    Sumaya Gatica    MRN: [id number]           Patient Information     Date Of Birth          1970        Visit Information        Provider Department      10/13/2017 4:00 PM Mireille Woodruff APRN [facility]        Today's Diagnoses     Right flank pain    -  1      Care Instructions    Lab work today.  Mireille Woodruff NP-C            Follow-ups after your visit        Who to contact     If you have questions or need follow up information about today's clinic visit or your schedule please contact [facility] directly at [phone].  Normal or non-critical lab and imaging results will be communicated to you by FastConnecthart, letter or phone within 4 business days after the clinic has received the results. If you do not hear from us within 7 days, please contact the clinic through FastConnecthart or phone. If you have a critical or abnormal lab result, we will notify you by phone as soon as possible.  Submit refill requests through Escapia or call your pharmacy and they will forward the refill request to us. Please allow 3 business days for your refill to be completed.          Additional Information About Your Visit        MyChart Information     Escapia gives you secure access to your electronic health record. If you see a primary care provider, you can also send messages to your care team and make appointments. If you have questions, please call your primary care clinic.  If you do not have a primary care provider, please call [phone] and they will assist you.        Care EveryWhere ID     This is your Care EveryWhere ID. This could be used by other organizations to access your [place] medical records  [id number]        Your Vitals Were     Pulse Temperature Respirations Height Last Period Pulse Oximetry    82 99.5  F (37.5  C) (Temporal) 16 5' 7.99\" (1.727 m) 06/15/2015 97%    BMI (Body Mass Index)                   " 31.51 kg/m2            Blood Pressure from Last 3 Encounters:   10/13/17 122/80   10/01/17 119/77   05/15/17 114/72    Weight from Last 3 Encounters:   10/13/17 207 lb 3.2 oz (94 kg)   08/22/17 204 lb (92.5 kg)   05/15/17 201 lb (91.2 kg)              We Performed the Following     *UA reflex to Microscopic and Culture ([place] and [facility] (except [place] and [place])     Amylase     CBC with platelets and differential     Comprehensive metabolic panel (BMP + Alb, Alk Phos, ALT, AST, Total. Bili, TP)     Lipase        Primary Care Provider Office Phone # Fax #    Tiffanie Montes PA-C [phone] [address] [place] [address]        Equal Access to Services     [facility]: Hadii stoney hogan hadasho Soomaali, waaxda luqadaha, qaybta kaalmada adeegyada, donny hernandez . So [facility] [phone].    ATENCIÓN: Si habla español, tiene a vargas disposición servicios gratuitos de asistencia lingüística. Llame al [phone].    We comply with applicable federal civil rights laws and [place] laws. We do not discriminate on the basis of race, color, national origin, age, disability, sex, sexual orientation, or gender identity.            Thank you!     Thank you for choosing [facility]  for your care. Our goal is always to provide you with excellent care. Hearing back from our patients is one way we can continue to improve our services. Please take a few minutes to complete the written survey that you may receive in the mail after your visit with us. Thank you!             Your Updated Medication List - Protect others around you: Learn how to safely use, store and throw away your medicines at www.disposemymeds.org.          This list is accurate as of: 10/13/17  4:45 PM.  Always use your most recent med list.                   Brand Name Dispense Instructions for use Diagnosis    escitalopram 20 MG tablet    LEXAPRO    90 tablet    TAKE ONE TABLET BY  MOUTH DAILY    Anxiety       ibuprofen 200 MG tablet    ADVIL/MOTRIN     Take 400 mg by mouth every 4 hours as needed Reported on 2/27/2017

## 2017-10-14 ASSESSMENT — ANXIETY QUESTIONNAIRES: GAD7 TOTAL SCORE: 7

## 2017-10-14 ASSESSMENT — PATIENT HEALTH QUESTIONNAIRE - PHQ9: SUM OF ALL RESPONSES TO PHQ QUESTIONS 1-9: 10

## 2017-10-17 ENCOUNTER — MYC MEDICAL ADVICE (OUTPATIENT)
Dept: FAMILY MEDICINE | Facility: OTHER | Age: 47
End: 2017-10-17

## 2017-10-17 DIAGNOSIS — G89.29 CHRONIC RIGHT FLANK PAIN: ICD-10-CM

## 2017-10-17 DIAGNOSIS — G89.29 ABDOMINAL PAIN, CHRONIC, RIGHT UPPER QUADRANT: ICD-10-CM

## 2017-10-17 DIAGNOSIS — R10.9 CHRONIC RIGHT FLANK PAIN: ICD-10-CM

## 2017-10-17 DIAGNOSIS — R10.11 ABDOMINAL PAIN, CHRONIC, RIGHT UPPER QUADRANT: ICD-10-CM

## 2017-10-17 DIAGNOSIS — K76.89 LIVER CYST: Primary | ICD-10-CM

## 2017-10-20 ENCOUNTER — TELEPHONE (OUTPATIENT)
Dept: FAMILY MEDICINE | Facility: OTHER | Age: 47
End: 2017-10-20

## 2017-10-20 NOTE — TELEPHONE ENCOUNTER
CD has been made for patient and placed at the  for continued care   Date of images 5/15/2017 XR T-Spine and 10/4/2017 MRI T-Spine

## 2017-11-07 NOTE — ADDENDUM NOTE
Encounter addended by: Oralia Spence, PT on: 11/7/2017  2:34 PM<BR>     Actions taken: Episode resolved

## 2018-01-12 ENCOUNTER — OFFICE VISIT (OUTPATIENT)
Dept: FAMILY MEDICINE | Facility: OTHER | Age: 48
End: 2018-01-12
Payer: COMMERCIAL

## 2018-01-12 VITALS
DIASTOLIC BLOOD PRESSURE: 83 MMHG | BODY MASS INDEX: 31.37 KG/M2 | RESPIRATION RATE: 16 BRPM | HEART RATE: 88 BPM | WEIGHT: 207 LBS | HEIGHT: 68 IN | TEMPERATURE: 98 F | SYSTOLIC BLOOD PRESSURE: 124 MMHG

## 2018-01-12 DIAGNOSIS — L50.9 URTICARIA: Primary | ICD-10-CM

## 2018-01-12 PROCEDURE — 99213 OFFICE O/P EST LOW 20 MIN: CPT | Performed by: FAMILY MEDICINE

## 2018-01-12 RX ORDER — PREDNISONE 20 MG/1
40 TABLET ORAL DAILY
Qty: 10 TABLET | Refills: 0 | Status: SHIPPED | OUTPATIENT
Start: 2018-01-12 | End: 2018-01-17

## 2018-01-12 ASSESSMENT — PAIN SCALES - GENERAL: PAINLEVEL: NO PAIN (0)

## 2018-01-12 ASSESSMENT — PATIENT HEALTH QUESTIONNAIRE - PHQ9: SUM OF ALL RESPONSES TO PHQ QUESTIONS 1-9: 4

## 2018-01-12 NOTE — PATIENT INSTRUCTIONS
Thank you for visiting [facility]    Continue with antihistamines.    If not doing pretty good in another hour, then I'd fill the prednisone.    If you start the prednisone, then be sure to finish the course unless instructed otherwise by a medical professional.      Please make an appointment with your either myself or your provider  in 1 week for follow up if not resolved.     Let us know if you'd like an allergy referral.    If you had imaging scheduled please refer to your radiology prep sheet.    Appointment    Date_______________     Time_____________    Day:   M TU W TH F    With____________________________    Location_________________________    If you need medication refills, please contact your pharmacy 3 days before your prescriptions runs out. If you are out of refills, your pharmacy will contact contact the clinic.    Contact us or return if questions or concerns.     -Your Care Team:  MD Tiffanie Barrera PA-C Joel De Haan, PA-C Elizabeth McLean, APRN CNP    General information about your clinic      Clinic hours:     Lab hours:  Phone [phone]  Monday 7:30 am-7 pm    Monday 8:30 am-6:30 pm  Tuesday-Friday 7:30 am-5 pm   Tuesday-Friday 8:30 am-4:30 pm    Pharmacy hours:  Phone [phone]  Monday 8:30 am-7pm  Tuesday-Friday 8:30am-6 pm                                       Mychart assistance [phone]        We would like to hear from you, how was your visit today?    Saumya Hutchinson  Patient Information Supervisor   Patient Care Supervisor  [facility], and [facility], and [facility]  [phone]

## 2018-01-12 NOTE — PROGRESS NOTES
SUBJECTIVE:   Sumaya Gatica is a 47 year old female who presents to clinic today for the following health issues:    ALLERGIES      Duration: 2 hours    Description:     Pt started with rash on her feet around noon.  Tried some athlete's foot spray.  Didn't help at all.  Her rash spread to her hands, then her arms.  Also on her trunk   Really itchy.  Denies shortness of breath, no swelling of upper airway.  Does feel her hands are puffy.  No one with similar symptoms.     Has had things like this with anxiety before.  She is allergic to crab and has seasonal allergies/dust and grass.    Took 2 loratadine with clear improvement. Also made her not feel as anxious.      Had some cashews around 10 AM.  Never had any trouble with nuts previously.            Problem list and histories reviewed & adjusted, as indicated.  Additional history: as documented    Current Outpatient Prescriptions   Medication Sig Dispense Refill     escitalopram (LEXAPRO) 20 MG tablet TAKE ONE TABLET BY MOUTH DAILY (Patient not taking: Reported on 1/12/2018) 90 tablet 2     ibuprofen (ADVIL,MOTRIN) 200 MG tablet Take 400 mg by mouth every 4 hours as needed Reported on 2/27/2017       Recent Labs   Lab Test  10/13/17   1648  04/22/17   0755  07/05/16   1725   11/04/15   1448  03/07/15   1300  03/27/13   1324   07/23/10   1010   LDL   --   104*   --    --    --    --    --    --   143*   HDL   --   88   --    --    --    --    --    --   65   TRIG   --   138   --    --    --    --    --    --   175*   ALT  29   --    --    --    --   30  24   < >   --    CR  0.81   --   0.84   < >   --   0.72   --    < >   --    GFRESTIMATED  75   --   73   < >   --   87   --    < >   --    GFRESTBLACK  >90   --   88   < >   --   >90   GFR Calc     --    < >   --    POTASSIUM  4.3   --   4.3   < >   --   3.9   --    < >   --    TSH   --    --   1.06   --   0.84   --    --    --   1.82    < > = values in this interval not displayed.      BP  "Readings from Last 3 Encounters:   01/12/18 124/83   10/13/17 122/80   10/01/17 119/77    Wt Readings from Last 3 Encounters:   01/12/18 207 lb (93.9 kg)   10/13/17 207 lb 3.2 oz (94 kg)   08/22/17 204 lb (92.5 kg)                    Reviewed and updated as needed this visit by clinical staffTobacco  Allergies  Med Hx  Surg Hx  Fam Hx  Soc Hx      Reviewed and updated as needed this visit by Provider         ROS:  Constitutional, HEENT, cardiovascular, pulmonary, gi and gu systems are negative, except as otherwise noted.      OBJECTIVE:   /83 (BP Location: Right arm, Patient Position: Chair, Cuff Size: Adult Regular)  Pulse 88  Temp 98  F (36.7  C) (Oral)  Resp 16  Ht 5' 7.99\" (1.727 m)  Wt 207 lb (93.9 kg)  LMP 06/15/2015  BMI 31.48 kg/m2  Body mass index is 31.48 kg/(m^2).  GENERAL: healthy, alert and no distress  RESP: lungs clear to auscultation - no rales, rhonchi or wheezes  CV: regular rate and rhythm, normal S1 S2, no S3 or S4, no murmur, click or rub, no peripheral edema and peripheral pulses strong  ABDOMEN: soft, nontender, no hepatosplenomegaly, no masses and bowel sounds normal  SKIN: some areas of urticaria on her back, posterior arms.  Swelling of hands, skin.    Diagnostic Test Results:  Results for orders placed or performed in visit on 10/13/17   *UA reflex to Microscopic and Culture ([place] and [facility] (except [place] and [place])   Result Value Ref [facility]    Color Urine Yellow     Appearance Urine Clear     Glucose Urine Negative NEG^Negative mg/dL    Bilirubin Urine Negative NEG^Negative    Ketones Urine Negative NEG^Negative mg/dL    Specific Gravity Urine 1.010 1.003 - 1.035    Blood Urine Negative NEG^Negative    pH Urine 6.0 5.0 - 7.0 pH    Protein Albumin Urine Negative NEG^Negative mg/dL    Urobilinogen Urine 0.2 0.2 - 1.0 EU/dL    Nitrite Urine Negative NEG^Negative    Leukocyte Esterase Urine Negative NEG^Negative    Source Unspecified Urine    Comprehensive " metabolic panel (BMP + Alb, Alk Phos, ALT, AST, Total. Bili, TP)   Result Value Ref [facility]    Sodium 142 133 - 144 mmol/L    Potassium 4.3 3.4 - 5.3 mmol/L    Chloride 105 94 - 109 mmol/L    Carbon Dioxide 28 20 - 32 mmol/L    Anion Gap 9 3 - 14 mmol/L    Glucose 88 70 - 99 mg/dL    Urea Nitrogen 15 7 - 30 mg/dL    Creatinine 0.81 0.52 - 1.04 mg/dL    GFR Estimate 75 >60 mL/min/1.7m2    GFR Estimate If Black >90 >60 mL/min/1.7m2    Calcium 8.7 8.5 - 10.1 mg/dL    Bilirubin Total 0.4 0.2 - 1.3 mg/dL    Albumin 3.8 3.4 - 5.0 g/dL    Protein Total 7.4 6.8 - 8.8 g/dL    Alkaline Phosphatase 73 40 - 150 U/L    ALT 29 0 - 50 U/L    AST 15 0 - 45 U/L   CBC with platelets and differential   Result Value Ref [facility]    WBC 8.2 4.0 - 11.0 10e9/L    RBC Count 4.42 3.8 - 5.2 10e12/L    Hemoglobin 13.8 11.7 - 15.7 g/dL    Hematocrit 40.4 35.0 - 47.0 %    MCV 91 78 - 100 fl    MCH 31.2 26.5 - 33.0 pg    MCHC 34.2 31.5 - 36.5 g/dL    RDW 12.2 10.0 - 15.0 %    Platelet Count 273 150 - 450 10e9/L    Diff Method Automated Method     % Neutrophils 53.0 %    % Lymphocytes 38.3 %    % Monocytes 7.0 %    % Eosinophils 1.5 %    % Basophils 0.2 %    Absolute Neutrophil 4.4 1.6 - 8.3 10e9/L    Absolute Lymphocytes 3.1 0.8 - 5.3 10e9/L    Absolute Monocytes 0.6 0.0 - 1.3 10e9/L    Absolute Eosinophils 0.1 0.0 - 0.7 10e9/L    Absolute Basophils 0.0 0.0 - 0.2 10e9/L   Lipase   Result Value Ref [facility]    Lipase 206 73 - 393 U/L   Amylase   Result Value Ref [facility]    Amylase 43 30 - 110 U/L       ASSESSMENT/PLAN:       ICD-10-CM    1. Urticaria L50.9 predniSONE (DELTASONE) 20 MG tablet     Pt seemed to be continuing to improve during her office visit with her recent use of antihistamines.  No signs/symptoms of anaphylaxis.  Discussed addition of H2 blocker or prednisone.  Will send in Rx for steroids in case she doesn't continue to improve.  Otherwise, can continue antihistamines.  Offered referral to allergist to help determine etiology, but pt  declines at this time.          Patient Instructions   Thank you for visiting [facility]    Continue with antihistamines.    If not doing pretty good in another hour, then I'd fill the prednisone.    If you start the prednisone, then be sure to finish the course unless instructed otherwise by a medical professional.      Please make an appointment with your either myself or your provider  in 1 week for follow up if not resolved.     Let us know if you'd like an allergy referral.    If you had imaging scheduled please refer to your radiology prep sheet.    Appointment    Date_______________     Time_____________    Day:   M TU W TH F    With____________________________    Location_________________________    If you need medication refills, please contact your pharmacy 3 days before your prescriptions runs out. If you are out of refills, your pharmacy will contact contact the clinic.    Contact us or return if questions or concerns.     -Your Care Team:  MD Tiffanie Barrera PA-C Joel De Haan, PA-C Elizabeth McLean, APRN CNP    General information about your clinic      Clinic hours:     Lab hours:  Phone [phone]  Monday 7:30 am-7 pm    Monday 8:30 am-6:30 pm  Tuesday-Friday 7:30 am-5 pm   Tuesday-Friday 8:30 am-4:30 pm    Pharmacy hours:  Phone [phone]  Monday 8:30 am-7pm  Tuesday-Friday 8:30am-6 pm                                       Mychart assistance [phone]        We would like to hear from you, how was your visit today?    Saumya Hutchinson  Patient Information Supervisor   Patient Care Supervisor  [facility], and [facility], [facility]  [phone]         Cyrus Rodrigues MD, MD  [facility]

## 2018-01-12 NOTE — MR AVS SNAPSHOT
After Visit Summary   1/12/2018    Sumaya Gatica    MRN: [id number]           Patient Information     Date Of Birth          1970        Visit Information        Provider Department      1/12/2018 1:45 PM Cyrus Rodrigues MD [facility]        Today's Diagnoses     Urticaria    -  1      Care Instructions    Thank you for visiting [facility]    Continue with antihistamines.    If not doing pretty good in another hour, then I'd fill the prednisone.    If you start the prednisone, then be sure to finish the course unless instructed otherwise by a medical professional.      Please make an appointment with your either myself or your provider  in 1 week for follow up if not resolved.     Let us know if you'd like an allergy referral.    If you had imaging scheduled please refer to your radiology prep sheet.    Appointment    Date_______________     Time_____________    Day:   M TU W TH F    With____________________________    Location_________________________    If you need medication refills, please contact your pharmacy 3 days before your prescriptions runs out. If you are out of refills, your pharmacy will contact contact the clinic.    Contact us or return if questions or concerns.     -Your Care Team:  MD Tiffanie Barrera PA-C Joel De Haan, PA-C Elizabeth McLean, APRN CNP    General information about your clinic      Clinic hours:     Lab hours:  Phone [phone]  Monday 7:30 am-7 pm    Monday 8:30 am-6:30 pm  Tuesday-Friday 7:30 am-5 pm   Tuesday-Friday 8:30 am-4:30 pm    Pharmacy hours:  Phone [phone]  Monday 8:30 am-7pm  Tuesday-Friday 8:30am-6 pm                                       Mychart assistance [phone]        We would like to hear from you, how was your visit today?    Saumya Hutchinson  Patient Information Supervisor   Patient Care Supervisor  [facility] [place] [facility] [place]  "[facility], [place], and [facility]  [phone]             Follow-ups after your visit        Who to contact     If you have questions or need follow up information about today's clinic visit or your schedule please contact [facility] directly at [phone].  Normal or non-critical lab and imaging results will be communicated to you by MyChart, letter or phone within 4 business days after the clinic has received the results. If you do not hear from us within 7 days, please contact the clinic through Faveeohart or phone. If you have a critical or abnormal lab result, we will notify you by phone as soon as possible.  Submit refill requests through Spot Influence or call your pharmacy and they will forward the refill request to us. Please allow 3 business days for your refill to be completed.          Additional Information About Your Visit        Faveeohart Information     Spot Influence gives you secure access to your electronic health record. If you see a primary care provider, you can also send messages to your care team and make appointments. If you have questions, please call your primary care clinic.  If you do not have a primary care provider, please call [phone] and they will assist you.        Care EveryWhere ID     This is your Care EveryWhere ID. This could be used by other organizations to access your [place] medical records  [id number]        Your Vitals Were     Pulse Temperature Respirations Height Last Period BMI (Body Mass Index)    88 98  F (36.7  C) (Oral) 16 5' 7.99\" (1.727 m) 06/15/2015 31.48 kg/m2       Blood Pressure from Last 3 Encounters:   01/12/18 124/83   10/13/17 122/80   10/01/17 119/77    Weight from Last 3 Encounters:   01/12/18 207 lb (93.9 kg)   10/13/17 207 lb 3.2 oz (94 kg)   08/22/17 204 lb (92.5 kg)              Today, you had the following     No orders found for display         Today's Medication Changes          These changes are " accurate as of: 1/12/18  2:28 PM.  If you have any questions, ask your nurse or doctor.               Start taking these medicines.        Dose/Directions    predniSONE 20 MG tablet   Commonly known as:  DELTASONE   Used for:  Urticaria   Started by:  Cyrus Rodrigues MD        Dose:  40 mg   Take 2 tablets (40 mg) by mouth daily for 5 days   Quantity:  10 tablet   Refills:  0            Where to get your medicines      These medications were sent to [facility] [place] - [address]   [address] [place] [address]     Phone:  [phone]     predniSONE 20 MG tablet                Primary Care Provider Office Phone # Fax #    Tiffanie Montes PA-C [phone] [address] [place] [address]        Equal Access to Services     [facility]: Hadii stoney hogan hadasho Soomaali, waaxda luqadaha, qaybta kaalmada adeegyada, donny hernandez . So [facility] [phone].    ATENCIÓN: Si habla español, tiene a vargas disposición servicios gratuitos de asistencia lingüística. Llame al [phone].    We comply with applicable federal civil rights laws and [place] laws. We do not discriminate on the basis of race, color, national origin, age, disability, sex, sexual orientation, or gender identity.            Thank you!     Thank you for choosing [facility]  for your care. Our goal is always to provide you with excellent care. Hearing back from our patients is one way we can continue to improve our services. Please take a few minutes to complete the written survey that you may receive in the mail after your visit with us. Thank you!             Your Updated Medication List - Protect others around you: Learn how to safely use, store and throw away your medicines at www.disposemymeds.org.          This list is accurate as of: 1/12/18  2:28 PM.  Always use your most recent med list.                   Brand Name Dispense  Instructions for use Diagnosis    escitalopram 20 MG tablet    LEXAPRO    90 tablet    TAKE ONE TABLET BY MOUTH DAILY    Anxiety       ibuprofen 200 MG tablet    ADVIL/MOTRIN     Take 400 mg by mouth every 4 hours as needed Reported on 2/27/2017        predniSONE 20 MG tablet    DELTASONE    10 tablet    Take 2 tablets (40 mg) by mouth daily for 5 days    Urticaria

## 2018-01-12 NOTE — NURSING NOTE
"Chief Complaint   Patient presents with     Hives     Derm Problem     Panel Management       Initial /83 (BP Location: Right arm, Patient Position: Chair, Cuff Size: Adult Regular)  Pulse 88  Temp 98  F (36.7  C) (Oral)  Resp 16  Ht 5' 7.99\" (1.727 m)  Wt 207 lb (93.9 kg)  LMP 06/15/2015  BMI 31.48 kg/m2 Estimated body mass index is 31.48 kg/(m^2) as calculated from the following:    Height as of this encounter: 5' 7.99\" (1.727 m).    Weight as of this encounter: 207 lb (93.9 kg).  Medication Reconciliation: complete  Karis Lau, GIACOMO    "

## 2018-03-12 ENCOUNTER — TELEPHONE (OUTPATIENT)
Dept: FAMILY MEDICINE | Facility: OTHER | Age: 48
End: 2018-03-12

## 2018-03-12 NOTE — TELEPHONE ENCOUNTER
Spoke with pt and told her that she will need to check with her insurance to see if a referral is necessary. If she does she will call back to let us know.    Jody Powers CMA ([facility])

## 2018-03-12 NOTE — TELEPHONE ENCOUNTER
Pt would like to have a referral to see an allergist at [facility] for 3/27/18.  She already has an appointment, but not sure if she needs a referral for that or not. Please call to advise.

## 2018-03-27 ENCOUNTER — OFFICE VISIT (OUTPATIENT)
Dept: ALLERGY | Facility: OTHER | Age: 48
End: 2018-03-27
Payer: COMMERCIAL

## 2018-03-27 VITALS
WEIGHT: 209 LBS | OXYGEN SATURATION: 97 % | HEART RATE: 84 BPM | TEMPERATURE: 97.9 F | SYSTOLIC BLOOD PRESSURE: 118 MMHG | DIASTOLIC BLOOD PRESSURE: 78 MMHG | BODY MASS INDEX: 31.79 KG/M2

## 2018-03-27 DIAGNOSIS — Z91.013 SHELLFISH ALLERGY: ICD-10-CM

## 2018-03-27 DIAGNOSIS — J30.1 CHRONIC SEASONAL ALLERGIC RHINITIS DUE TO POLLEN: Primary | ICD-10-CM

## 2018-03-27 DIAGNOSIS — Z91.018 TREE NUT ALLERGY: ICD-10-CM

## 2018-03-27 PROBLEM — H10.9 RHINOCONJUNCTIVITIS: Status: ACTIVE | Noted: 2018-03-27

## 2018-03-27 PROBLEM — J31.0 RHINOCONJUNCTIVITIS: Status: ACTIVE | Noted: 2018-03-27

## 2018-03-27 PROCEDURE — 86003 ALLG SPEC IGE CRUDE XTRC EA: CPT | Mod: 90 | Performed by: ALLERGY & IMMUNOLOGY

## 2018-03-27 PROCEDURE — 36415 COLL VENOUS BLD VENIPUNCTURE: CPT | Performed by: ALLERGY & IMMUNOLOGY

## 2018-03-27 PROCEDURE — 86003 ALLG SPEC IGE CRUDE XTRC EA: CPT | Performed by: ALLERGY & IMMUNOLOGY

## 2018-03-27 PROCEDURE — 95004 PERQ TESTS W/ALRGNC XTRCS: CPT | Performed by: ALLERGY & IMMUNOLOGY

## 2018-03-27 PROCEDURE — 86003 ALLG SPEC IGE CRUDE XTRC EA: CPT | Mod: 59 | Performed by: ALLERGY & IMMUNOLOGY

## 2018-03-27 PROCEDURE — 99000 SPECIMEN HANDLING OFFICE-LAB: CPT | Performed by: ALLERGY & IMMUNOLOGY

## 2018-03-27 PROCEDURE — 99204 OFFICE O/P NEW MOD 45 MIN: CPT | Mod: 25 | Performed by: ALLERGY & IMMUNOLOGY

## 2018-03-27 RX ORDER — EPINEPHRINE 0.3 MG/.3ML
0.3 INJECTION SUBCUTANEOUS PRN
Qty: 0.6 ML | Refills: 1 | Status: SHIPPED | OUTPATIENT
Start: 2018-03-27 | End: 2020-03-03

## 2018-03-27 RX ORDER — LORATADINE 10 MG/1
10 TABLET ORAL DAILY
COMMUNITY
End: 2021-06-21

## 2018-03-27 RX ORDER — CALCIUM CARBONATE 500(1250)
1 TABLET ORAL DAILY
COMMUNITY

## 2018-03-27 NOTE — ASSESSMENT & PLAN NOTE
History of itchy skin and nausea after consumption of crab.  Tolerates shrimp.  Unclear about lobster.    Crab 3 mm/3 mm.  Lobster nonreactive, but negative histamine.    -Continue to avoid shellfish.  -Serum IgE for shellfish.  -An anaphylaxis action plan was given and reviewed with patient and family.   Injectable epinephrine use was reviewed and demonstrated. The patient will need to carry injectable epinephrine.   Injectable epinephrine script provided.

## 2018-03-27 NOTE — MR AVS SNAPSHOT
After Visit Summary   3/27/2018    Sumaya Gatica    MRN: [id number]           Patient Information     Date Of Birth          1970        Visit Information        Provider Department      3/27/2018 2:00 PM Jesús Lowry DO [facility]        Today's Diagnoses     Rhinoconjunctivitis    -  1    Tree nut allergy        Shellfish allergy          Care Instructions    Allergy Staff Appt Hours Shot Hours Locations    Physician     Jesús Lowry DO       Support Staff     Pallavi VU RN      Aniyah VU, [facility]  Monday:                      [place] 8-7     Tuesday:         [place] 8-5     Wednesday:        [place]: 7-5     Friday:        [place] 7-5   [place] Monday: 9-5:50        Wednesday: 2-5:50        Friday: 7-12:50     [place]        Tuesday: 7-10:50        Thursday: 1:30-6:30     [place] Monday: 7:10-4:50        Tuesday: 12:30-6:30        Thursday: 7-11:50 [facility]  [address] [place] [address]  Appt Line: [phone]  Allergy RN (Monday):  [phone]    [facility]  [address] [place] [address]  Appt Line: [phone]  Allergy RN (Tues & Wed):  [phone]    [facility]  [address] [place] [address]  Appt Line: [phone]  Allergy RN (Friday):  [phone]       Important Scheduling Information  Aspirin Desensitization: Appt will last 2 clinic days. Please call the Allergy RN line for your clinic to schedule. Discontinue antihistamines 7 days prior to the appointment.     Food Challenges: Appt will last 3-4 hours. Please call the Allergy RN line for your clinic to schedule. Discontinue antihistamines 7 days prior to the appointment.     Penicillin Testing: Appt will last 2-3 hours. Please call the Allergy RN line for your clinic to schedule. Discontinue antihistamines 7 days prior to the appointment.     Skin Testing: Appt will about 40 minutes. Call the appointment line for your clinic to  schedule. Discontinue antihistamines 7 days prior to the appointment.     Venom Testing: Appt will last 2-3 hours. Please call the Allergy RN line for your clinic to schedule. Discontinue antihistamines 7 days prior to the appointment.     Thank you for trusting us with your Allergy, Asthma, and Immunology care. Please feel free to contact us with any questions or concerns you may have.      - Blood testing today.   - Start Nasacort 2 sprays/nostril daily.   - Cetirizine, loratadine or fexofenadine daily as needed.   - Ketotifen (Zaditor, Alaway) 1 drop/eye twice daily as needed.   - Avoid peanut, tree nuts and shellfish for now.   - See anaphylaxis action plan. I will send a new one based on testing results.   - Return to clinic in 3 months.           Follow-ups after your visit        Follow-up notes from your care team     Return in about 3 months (around 6/27/2018).      Who to contact     If you have questions or need follow up information about today's clinic visit or your schedule please contact [facility] directly at [phone].  Normal or non-critical lab and imaging results will be communicated to you by CroquetteLandhart, letter or phone within 4 business days after the clinic has received the results. If you do not hear from us within 7 days, please contact the clinic through TryLifet or phone. If you have a critical or abnormal lab result, we will notify you by phone as soon as possible.  Submit refill requests through Exelis or call your pharmacy and they will forward the refill request to us. Please allow 3 business days for your refill to be completed.          Additional Information About Your Visit        CroquetteLandharVysr Information     Exelis gives you secure access to your electronic health record. If you see a primary care provider, you can also send messages to your care team and make appointments. If you have questions, please call your primary care clinic.  If you do not have a primary care  provider, please call [phone] and they will assist you.        Care EveryWhere ID     This is your Care EveryWhere ID. This could be used by other organizations to access your [place] medical records  [id number]        Your Vitals Were     Pulse Temperature Last Period Pulse Oximetry BMI (Body Mass Index)       84 97.9  F (36.6  C) (Oral) 06/15/2015 97% 31.79 kg/m2        Blood Pressure from Last 3 Encounters:   03/27/18 118/78   01/12/18 124/83   10/13/17 122/80    Weight from Last 3 Encounters:   03/27/18 94.8 kg (209 lb)   01/12/18 93.9 kg (207 lb)   10/13/17 94 kg (207 lb 3.2 oz)              We Performed the Following     Allergen almonds IgE     Allergen alternaria alternata IgE     Allergen jefferson white IgE     Allergen aspergillus fumigatus IgE     Allergen cashew IgE     Allergen cat epithellium IgE     Allergen Cedar IgE     Allergen cladosporium herbarum IgE     Allergen cottonwood IgE     Allergen crab IgE     Allergen D farinae IgE     Allergen D pteronyssinus IgE     Allergen dog epithelium IgE     Allergen elm IgE     Allergen English plantain IgE     Allergen Epicoccum purpurascens IgE     Allergen giant ragweed IgE     Allergen Richard grass IgE     Allergen lamb's quarter IgE     Allergen lobster IgE     Allergen maple box elder IgE     Allergen Mugwort IgE     Allergen oak white IgE     Allergen peanut IgE     Allergen pecan nut IgE     Allergen penicillium notatum IgE     Allergen pistachio nut IgE     Allergen ragweed short IgE     Allergen Red Worcester IgE     Allergen Sagebrush Wormwood IgE     Allergen Sheep Sorrel IgE     Allergen silver  birch IgE     Allergen thistle Russian IgE     Allergen daljit IgE     Allergen Tree White [place] IgE     Allergen Pocahontas Tree     Allergen walnuts IgE     Allergen Weed Nettle IgE     Allergen white pine IgE     Allergen, Kochia/Firebush          Today's Medication Changes          These changes are accurate as of 3/27/18  2:59 PM.  If you have any  questions, ask your nurse or doctor.               Start taking these medicines.        Dose/Directions    EPINEPHrine 0.3 MG/0.3ML injection 2-pack   Commonly known as:  EPIPEN 2-ROMARIO   Used for:  Tree nut allergy, Shellfish allergy   Started by:  Jesús Lowry DO        Dose:  0.3 mg   Inject 0.3 mLs (0.3 mg) into the muscle as needed for anaphylaxis   Quantity:  0.6 mL   Refills:  1            Where to get your medicines      These medications were sent to [facility] [place] - [address]   [address] [place] [address]     Phone:  [phone]     EPINEPHrine 0.3 MG/0.3ML injection 2-pack                Primary Care Provider Office Phone # Fax #    Tiffanie Montes PA-C [phone] [address] [place] [address]        Equal Access to Services     [facility]: Hadii aad ku hadasho Soomaali, waaxda luqadaha, qaybta kaalmada adeegyada, waxay marco ain hayaan cheryl hernandez . So [facility] [phone].    ATENCIÓN: Si habla español, tiene a vargas disposición servicios gratuitos de asistencia lingüística. Llame al [phone].    We comply with applicable federal civil rights laws and [place] laws. We do not discriminate on the basis of race, color, national origin, age, disability, sex, sexual orientation, or gender identity.            Thank you!     Thank you for choosing [facility]  for your care. Our goal is always to provide you with excellent care. Hearing back from our patients is one way we can continue to improve our services. Please take a few minutes to complete the written survey that you may receive in the mail after your visit with us. Thank you!             Your Updated Medication List - Protect others around you: Learn how to safely use, store and throw away your medicines at www.disposemymeds.org.          This list is accurate as of 3/27/18  2:59 PM.  Always use your most recent med list.                   Brand  Name Dispense Instructions for use Diagnosis    calcium carbonate 1250 MG tablet    OS-EDIS 500 mg [place]. [place]     Take 1 tablet by mouth daily        EPINEPHrine 0.3 MG/0.3ML injection 2-pack    EPIPEN 2-ROMARIO    0.6 mL    Inject 0.3 mLs (0.3 mg) into the muscle as needed for anaphylaxis    Tree nut allergy, Shellfish allergy       escitalopram 20 MG tablet    LEXAPRO    90 tablet    TAKE ONE TABLET BY MOUTH DAILY    Anxiety       ibuprofen 200 MG tablet    ADVIL/MOTRIN     Take 400 mg by mouth every 4 hours as needed Reported on 2/27/2017        loratadine 10 MG tablet    CLARITIN     Take 10 mg by mouth daily        NALTREXONE HCL PO      Take 12.5 mg by mouth daily        UNABLE TO FIND      Apply topically daily MEDICATION NAME: testosterone cream        UNABLE TO FIND      Place 1 drop into both eyes daily MEDICATION NAME: Alaway 0.035%        VITAMIN D (CHOLECALCIFEROL) PO      Take 10,000 Units by mouth daily

## 2018-03-27 NOTE — PATIENT INSTRUCTIONS
Allergy Staff Appt Hours Shot Hours Locations    Physician     Jesús Lowry, DO       Support Staff     Pallavi VU RN      Aniyah VU, [facility]  Monday:                      [place] 8-7     Tuesday:         [place] 8-5     Wednesday:        [place]: 7-5     Friday:        [place] 7-5   [place]        Monday: 9-5:50        Wednesday: 2-5:50        Friday: 7-12:50     [place]        Tuesday: 7-10:50        Thursday: 1:30-6:30     [place] Monday: 7:10-4:50        Tuesday: 12:30-6:30        Thursday: 7-11:50 [facility]  [address] [place] [address]  Appt Line: [phone]  Allergy RN (Monday):  [phone]    [facility]  [address] [place] [address]  Appt Line: [phone]  Allergy RN (Tues & Wed):  [phone]    [facility]  [address] [place] [address]  Appt Line: [phone]  Allergy RN (Friday):  [phone]       Important Scheduling Information  Aspirin Desensitization: Appt will last 2 clinic days. Please call the Allergy RN line for your clinic to schedule. Discontinue antihistamines 7 days prior to the appointment.     Food Challenges: Appt will last 3-4 hours. Please call the Allergy RN line for your clinic to schedule. Discontinue antihistamines 7 days prior to the appointment.     Penicillin Testing: Appt will last 2-3 hours. Please call the Allergy RN line for your clinic to schedule. Discontinue antihistamines 7 days prior to the appointment.     Skin Testing: Appt will about 40 minutes. Call the appointment line for your clinic to schedule. Discontinue antihistamines 7 days prior to the appointment.     Venom Testing: Appt will last 2-3 hours. Please call the Allergy RN line for your clinic to schedule. Discontinue antihistamines 7 days prior to the appointment.     Thank you for trusting us with your Allergy, Asthma, and Immunology care. Please feel free to contact us with any questions or concerns you may have.      - Blood testing  today.   - Start Nasacort 2 sprays/nostril daily.   - Cetirizine, loratadine or fexofenadine daily as needed.   - Ketotifen (Zaditor, Alaway) 1 drop/eye twice daily as needed.   - Avoid peanut, tree nuts and shellfish for now.   - See anaphylaxis action plan. I will send a new one based on testing results.   - Return to clinic in 3 months.

## 2018-03-27 NOTE — PROGRESS NOTES
Sumaya Gatica is a 47 year old White female with previous medical history significant for anxiety. Sumaya Gatica is being seen today for evaluation of allergies to food and seasonal allergies.     The patient reports that she has perennial with spring and fall worsening nasal and ocular symptoms.  Symptoms include rhinorrhea, congestion, postnasal drainage, sinus headaches, ocular itching and ocular watering.  She has tried all oral antihistamines and has found these to be beneficial.  Currently she is on loratadine and Alaway.  She used Nasacort in the past and was helpful, but she has discontinued.  Symptoms are made worse with exposure to dust, mowing grass, raking leaves, humid air and smoke.  No history of allergy testing.  No history of chronic sinusitis, nasal polyposis or sinus surgery.    The patient reports that on numerous occasions after consuming tree nuts (pistachio, cashew and walnut) within 30-40 minutes she will develop itching all over.  She has developed hives on her trunk and arms.  She has had associated diarrhea and nausea.  She believes she has had itchy hands and feet after eating peanut butter.  After eating crab she has had diffuse itching skin and felt nauseated.  She is unclear if she has had lobster.  She tolerates shrimp.  No history of food allergy testing.  She does not carry injectable epinephrine.    The patient has no history of asthma, eczema, medications allergies or hives.     ENVIRONMENTAL HISTORY: [place] is heated with a forced air. They does have central air conditioning. The patient's bedroom is furnished with carpeting in bedroom and fabric window coverings.  Pets inside the house include 3 dog(s). There is not history of cockroach or mice infestation. There is/are 0 smokers in the house.  The house does not have a damp basement.         Past Medical History:   Diagnosis Date     Abnormal Pap smear, can't excl hi  gd sq intraepithelial lesion (ASC-H) 3/22/07    negative colposcopy     History of colposcopy with cervical biopsy 07     Ovarian cysts      Rosacea      Family History   Problem Relation Age of Onset     Alcohol/Drug Paternal Grandfather      alcohlism     Other Cancer Paternal Grandfather      CANCER Maternal Grandfather      gallbladder     DIABETES Maternal Grandmother           CEREBROVASCULAR DISEASE Paternal Grandmother      Alzheimer Disease Paternal Grandmother      and dementia     Prostate Cancer Brother      DIABETES Mother      Alzheimer Disease Mother      CANCER Brother      skin     Past Surgical History:   Procedure Laterality Date     COLONOSCOPY  2012    Procedure: COLONOSCOPY;  Colonscopy Screening, Diverticulitis;  Surgeon: Reilly Holt MD;  Location:  GI     DILATION AND CURETTAGE, HYSTEROSCOPY, ABLATE ENDOMETRIUM NOVASURE, COMBINED  2011    Procedure:COMBINED DILATION AND CURETTAGE, HYSTEROSCOPY, ABLATE ENDOMETRIUM NOVASURE; hysteroscopy, dialtion and curettage, novasure endometrial ablation  ; Surgeon:MILAD VILLARREAL; Location: OR     GYN SURGERY      Hy      HC REMOVAL OF TONSILS,<11 Y/O       HC REPAIR OF NASAL SEPTUM  08    Septoplasty, submucosal resection inferior turbinates.       REVIEW OF SYSTEMS:  General: positive  for weight gain. negative for weight loss. negative for changes in sleep.   Ears: negative for fullness. negative for hearing loss. negative for dizziness.   Nose: positive  for snoring.negative for changes in smell. negative for drainage.   Eyes: positive  for eye watering. positive  for eye itching. negative for vision changes. negative for eye redness.  Throat: negative for hoarseness. negative for sore throat. negative for trouble swallowing.   Lungs: negative for shortness of breath.negative for wheezing. negative for sputum production.   Cardiovascular: negative for chest pain. negative for swelling of ankles. negative  for fast or irregular heartbeat.   Gastrointestinal: negative for nausea. negative for heartburn. negative for acid reflux.   Musculoskeletal: positive  for joint pain. positive  for joint stiffness. negative for joint swelling.   Neurologic: negative for seizures. negative for fainting. negative for weakness.   Psychiatric: negative for changes in mood. positive  for anxiety.   Endocrine: negative for cold intolerance. negative for heat intolerance. negative for tremors.   Lymphatic: negative for lower extremity swelling. negative for lymph node swelling.   Hematologic: negative for easy bruising. negative for easy bleeding.  Integumentary: negative for rash. negative for scaling. negative for nail changes.       Current Outpatient Prescriptions:      NALTREXONE HCL PO, Take 12.5 mg by mouth daily, Disp: , Rfl:      calcium carbonate (OS-EDIS 500 MG [place]. [place]) 1250 MG tablet, Take 1 tablet by mouth daily, Disp: , Rfl:      VITAMIN D, CHOLECALCIFEROL, PO, Take 10,000 Units by mouth daily, Disp: , Rfl:      UNABLE TO FIND, Apply topically daily MEDICATION NAME: testosterone cream, Disp: , Rfl:      EPINEPHrine (EPIPEN 2-ROMARIO) 0.3 MG/0.3ML injection 2-pack, Inject 0.3 mLs (0.3 mg) into the muscle as needed for anaphylaxis, Disp: 0.6 mL, Rfl: 1     ibuprofen (ADVIL,MOTRIN) 200 MG tablet, Take 400 mg by mouth every 4 hours as needed Reported on 2/27/2017, Disp: , Rfl:      loratadine (CLARITIN) 10 MG tablet, Take 10 mg by mouth daily, Disp: , Rfl:      UNABLE TO FIND, Place 1 drop into both eyes daily MEDICATION NAME: Alaway 0.035%, Disp: , Rfl:      escitalopram (LEXAPRO) 20 MG tablet, TAKE ONE TABLET BY MOUTH DAILY (Patient not taking: Reported on 1/12/2018), Disp: 90 tablet, Rfl: 2  Immunization History   Administered Date(s) Administered     TD (ADULT, 7+) 11/02/2000     TDAP Vaccine (Adacel) 07/23/2010     Allergies   Allergen Reactions     No Known Drug Allergies      Seasonal Allergies          EXAM:    Constitutional:  Appears well-developed and well-nourished. No distress.   HEENT:   Head: Normocephalic.   Right Ear: External ear normal. TM normal  Left Ear: External ear normal. TM normal  Mouth/Throat: No oropharyngeal exudate present.   No cobblestoning of posterior oropharynx.   Nasal tissue pink and normal appearing.  No rhinorrhea noted.    Eyes: Conjunctivae are non-erythematous   No maxillary or frontal sinus tenderness to palpation.   Cardiovascular: Normal rate, regular rhythm and normal heart sounds. Exam reveals no gallop and no friction rub.   No murmur heard.  Respiratory: Effort normal and breath sounds normal. No respiratory distress. No wheezes. No rales.   Musculoskeletal: Normal range of motion.   Lymphadenopathy:   No cervical adenopathy.   No lower extremity edema.   Neuro: Oriented to person, place, and time.  Skin: Skin is warm and dry. No rash noted.   Psychiatric: Normal mood and affect.     Nursing note and vitals reviewed.      WORKUP:   Skin testing  Positive for oak trees, peanut and crab.  However, nonreactive histamine and cannot interpret negative results.  ASSESSMENT/PLAN:  Problem List Items Addressed This Visit        Respiratory    Chronic seasonal allergic rhinitis due to pollen - Primary     Perennial with spring and fall worsening nasal and ocular symptoms.  Current treatment is loratadine and Alaway.    Skin testing with nonreactive histamine.  Cannot interpret negative results.  Oak was positive.    - Flonase, Nasacort or Rhinocort 2 sprays/nostril daily.   - Zyrtec, Allegra or Claritin daily as needed for allergy symptoms.   - Ketotifen 1 drop/eye twice daily as needed for allergy symptoms.   - Aeroallergen avoidance measures will be provided after blood testing results.              Relevant Medications    loratadine (CLARITIN) 10 MG tablet    Other Relevant Orders    Allergen cat epithellium IgE (Completed)    Allergen dog epithelium IgE (Completed)    Allergen Richard  grass IgE (Completed)    Allergen daljit IgE (Completed)    Allergen D pteronyssinus IgE (Completed)    Allergen D farinae IgE (Completed)    Allergen alternaria alternata IgE (Completed)    Allergen aspergillus fumigatus IgE (Completed)    Allergen cladosporium herbarum IgE (Completed)    Allergen Epicoccum purpurascens IgE (Completed)    Allergen penicillium notatum IgE (Completed)    Allergen Red [place] IgE (Completed)    Allergen silver  birch IgE (Completed)    Allergen Tree White [place] IgE (Completed)    Allergen [place] Tree (Completed)    Allergen white pine IgE (Completed)    Allergen jefferson white IgE (Completed)    Allergen [place] IgE (Completed)    Allergen cottonwood IgE (Completed)    Allergen elm IgE (Completed)    Allergen maple box elder IgE (Completed)    Allergen oak white IgE (Completed)    Allergen English plantain IgE (Completed)    Allergen giant ragweed IgE (Completed)    Allergen lamb's quarter IgE (Completed)    Allergen Mugwort IgE (Completed)    Allergen ragweed short IgE (Completed)    Allergen Sagebrush Wormwood IgE (Completed)    Allergen Sheep Sorrel IgE (Completed)    Allergen thistle Russian IgE (Completed)    Allergen Weed Nettle IgE (Completed)    Allergen, Kochia/Firebush (Completed)    ALLERGY SKIN TESTS,ALLERGENS (Completed)       Other    Tree nut allergy     Diffuse itching, hives, vomiting and diarrhea after consuming tree nuts and itchy hands after consuming peanut.    Peanut 3 mm/3 mm  All tree nuts were negative, but nonreactive histamine.      -Continue to avoid peanut and tree nuts.  -Serum IgE for peanuts and tree nuts.  -An anaphylaxis action plan was given and reviewed with patient and family.   Injectable epinephrine use was reviewed and demonstrated. The patient will need to carry injectable epinephrine.   Injectable epinephrine script provided.            Relevant Medications    loratadine (CLARITIN) 10 MG tablet    EPINEPHrine (EPIPEN 2-ROMARIO) 0.3 MG/0.3ML  injection 2-pack    Other Relevant Orders    Allergen peanut IgE (Completed)    Allergen almonds IgE (Completed)    Allergen cashew IgE (Completed)    Allergen pecan nut IgE (Completed)    Allergen pistachio nut IgE (Completed)    Allergen walnuts IgE (Completed)    ALLERGY SKIN TESTS,ALLERGENS (Completed)    Shellfish allergy     History of itchy skin and nausea after consumption of crab.  Tolerates shrimp.  Unclear about lobster.    Crab 3 mm/3 mm.  Lobster nonreactive, but negative histamine.    -Continue to avoid shellfish.  -Serum IgE for shellfish.  -An anaphylaxis action plan was given and reviewed with patient and family.   Injectable epinephrine use was reviewed and demonstrated. The patient will need to carry injectable epinephrine.   Injectable epinephrine script provided.            Relevant Medications    loratadine (CLARITIN) 10 MG tablet    EPINEPHrine (EPIPEN 2-ROMARIO) 0.3 MG/0.3ML injection 2-pack    Other Relevant Orders    Allergen crab IgE (Completed)    Allergen lobster IgE (Completed)    ALLERGY SKIN TESTS,ALLERGENS (Completed)          Chart documentation with Dragon Voice recognition Software. Although reviewed after completion, some words and grammatical errors may remain.    Jesús Lowry,    Allergy/Immunology  [facility] and [place]

## 2018-03-27 NOTE — LETTER
3/27/2018         RE: Sumaya Gatica  [id number]  [place] [address]        Dear Colleague,    Thank you for referring your patient, Sumaya Gatica, to the [facility]. Please see a copy of my visit note below.    Sumaya Gatica is a 47 year old White female with previous medical history significant for anxiety. Sumaya Gatica is being seen today for evaluation of allergies to food and seasonal allergies.     The patient reports that she has perennial with spring and fall worsening nasal and ocular symptoms.  Symptoms include rhinorrhea, congestion, postnasal drainage, sinus headaches, ocular itching and ocular watering.  She has tried all oral antihistamines and has found these to be beneficial.  Currently she is on loratadine and Alaway.  She used Nasacort in the past and was helpful, but she has discontinued.  Symptoms are made worse with exposure to dust, mowing grass, raking leaves, humid air and smoke.  No history of allergy testing.  No history of chronic sinusitis, nasal polyposis or sinus surgery.    The patient reports that on numerous occasions after consuming tree nuts (pistachio, cashew and walnut) within 30-40 minutes she will develop itching all over.  She has developed hives on her trunk and arms.  She has had associated diarrhea and nausea.  She believes she has had itchy hands and feet after eating peanut butter.  After eating crab she has had diffuse itching skin and felt nauseated.  She is unclear if she has had lobster.  She tolerates shrimp.  No history of food allergy testing.  She does not carry injectable epinephrine.    The patient has no history of asthma, eczema, medications allergies or hives.     ENVIRONMENTAL HISTORY: [place] is heated with a forced air. They does have central air conditioning. The patient's bedroom is furnished with carpeting in bedroom and fabric window coverings.   Pets inside the house include 3 dog(s). There is not history of cockroach or mice infestation. There is/are 0 smokers in the house.  The house does not have a damp basement.         Past Medical History:   Diagnosis Date     Abnormal Pap smear, can't excl hi gd sq intraepithelial lesion (ASC-H) 3/22/07    negative colposcopy     History of colposcopy with cervical biopsy 07     Ovarian cysts      Rosacea      Family History   Problem Relation Age of Onset     Alcohol/Drug Paternal Grandfather      alcohlism     Other Cancer Paternal Grandfather      CANCER Maternal Grandfather      gallbladder     DIABETES Maternal Grandmother           CEREBROVASCULAR DISEASE Paternal Grandmother      Alzheimer Disease Paternal Grandmother      and dementia     Prostate Cancer Brother      DIABETES Mother      Alzheimer Disease Mother      CANCER Brother      skin     Past Surgical History:   Procedure Laterality Date     COLONOSCOPY  2012    Procedure: COLONOSCOPY;  Colonscopy Screening, Diverticulitis;  Surgeon: Reilly Holt MD;  Location:  GI     DILATION AND CURETTAGE, HYSTEROSCOPY, ABLATE ENDOMETRIUM NOVASURE, COMBINED  2011    Procedure:COMBINED DILATION AND CURETTAGE, HYSTEROSCOPY, ABLATE ENDOMETRIUM NOVASURE; hysteroscopy, dialtion and curettage, novasure endometrial ablation  ; Surgeon:MILAD VILLARREAL; Location: OR     GYN SURGERY      Hy      HC REMOVAL OF TONSILS,<11 Y/O       HC REPAIR OF NASAL SEPTUM  08    Septoplasty, submucosal resection inferior turbinates.       REVIEW OF SYSTEMS:  General: positive  for weight gain. negative for weight loss. negative for changes in sleep.   Ears: negative for fullness. negative for hearing loss. negative for dizziness.   Nose: positive  for snoring.negative for changes in smell. negative for drainage.   Eyes: positive  for eye watering. positive  for eye itching. negative for vision changes. negative for eye redness.  Throat:  negative for hoarseness. negative for sore throat. negative for trouble swallowing.   Lungs: negative for shortness of breath.negative for wheezing. negative for sputum production.   Cardiovascular: negative for chest pain. negative for swelling of ankles. negative for fast or irregular heartbeat.   Gastrointestinal: negative for nausea. negative for heartburn. negative for acid reflux.   Musculoskeletal: positive  for joint pain. positive  for joint stiffness. negative for joint swelling.   Neurologic: negative for seizures. negative for fainting. negative for weakness.   Psychiatric: negative for changes in mood. positive  for anxiety.   Endocrine: negative for cold intolerance. negative for heat intolerance. negative for tremors.   Lymphatic: negative for lower extremity swelling. negative for lymph node swelling.   Hematologic: negative for easy bruising. negative for easy bleeding.  Integumentary: negative for rash. negative for scaling. negative for nail changes.       Current Outpatient Prescriptions:      NALTREXONE HCL PO, Take 12.5 mg by mouth daily, Disp: , Rfl:      calcium carbonate (OS-EDIS 500 MG [place]. [place]) 1250 MG tablet, Take 1 tablet by mouth daily, Disp: , Rfl:      VITAMIN D, CHOLECALCIFEROL, PO, Take 10,000 Units by mouth daily, Disp: , Rfl:      UNABLE TO FIND, Apply topically daily MEDICATION NAME: testosterone cream, Disp: , Rfl:      EPINEPHrine (EPIPEN 2-ROMARIO) 0.3 MG/0.3ML injection 2-pack, Inject 0.3 mLs (0.3 mg) into the muscle as needed for anaphylaxis, Disp: 0.6 mL, Rfl: 1     ibuprofen (ADVIL,MOTRIN) 200 MG tablet, Take 400 mg by mouth every 4 hours as needed Reported on 2/27/2017, Disp: , Rfl:      loratadine (CLARITIN) 10 MG tablet, Take 10 mg by mouth daily, Disp: , Rfl:      UNABLE TO FIND, Place 1 drop into both eyes daily MEDICATION NAME: Alaway 0.035%, Disp: , Rfl:      escitalopram (LEXAPRO) 20 MG tablet, TAKE ONE TABLET BY MOUTH DAILY (Patient not taking: Reported on 1/12/2018),  Disp: 90 tablet, Rfl: 2  Immunization History   Administered Date(s) Administered     TD (ADULT, 7+) 11/02/2000     TDAP Vaccine (Adacel) 07/23/2010     Allergies   Allergen Reactions     No Known Drug Allergies      Seasonal Allergies          EXAM:   Constitutional:  Appears well-developed and well-nourished. No distress.   HEENT:   Head: Normocephalic.   Right Ear: External ear normal. TM normal  Left Ear: External ear normal. TM normal  Mouth/Throat: No oropharyngeal exudate present.   No cobblestoning of posterior oropharynx.   Nasal tissue pink and normal appearing.  No rhinorrhea noted.    Eyes: Conjunctivae are non-erythematous   No maxillary or frontal sinus tenderness to palpation.   Cardiovascular: Normal rate, regular rhythm and normal heart sounds. Exam reveals no gallop and no friction rub.   No murmur heard.  Respiratory: Effort normal and breath sounds normal. No respiratory distress. No wheezes. No rales.   Musculoskeletal: Normal range of motion.   Lymphadenopathy:   No cervical adenopathy.   No lower extremity edema.   Neuro: Oriented to person, place, and time.  Skin: Skin is warm and dry. No rash noted.   Psychiatric: Normal mood and affect.     Nursing note and vitals reviewed.      WORKUP:   Skin testing  Positive for oak trees, peanut and crab.  However, nonreactive histamine and cannot interpret negative results.  ASSESSMENT/PLAN:  Problem List Items Addressed This Visit        Respiratory    Chronic seasonal allergic rhinitis due to pollen - Primary     Perennial with spring and fall worsening nasal and ocular symptoms.  Current treatment is loratadine and Alaway.    Skin testing with nonreactive histamine.  Cannot interpret negative results.  Oak was positive.    - Flonase, Nasacort or Rhinocort 2 sprays/nostril daily.   - Zyrtec, Allegra or Claritin daily as needed for allergy symptoms.   - Ketotifen 1 drop/eye twice daily as needed for allergy symptoms.   - Aeroallergen avoidance  measures will be provided after blood testing results.              Relevant Medications    loratadine (CLARITIN) 10 MG tablet    Other Relevant Orders    Allergen cat epithellium IgE (Completed)    Allergen dog epithelium IgE (Completed)    Allergen Richard grass IgE (Completed)    Allergen daljit IgE (Completed)    Allergen D pteronyssinus IgE (Completed)    Allergen D farinae IgE (Completed)    Allergen alternaria alternata IgE (Completed)    Allergen aspergillus fumigatus IgE (Completed)    Allergen cladosporium herbarum IgE (Completed)    Allergen Epicoccum purpurascens IgE (Completed)    Allergen penicillium notatum IgE (Completed)    Allergen Red [place] IgE (Completed)    Allergen silver  birch IgE (Completed)    Allergen Tree White [place] IgE (Completed)    Allergen [place] Tree (Completed)    Allergen white pine IgE (Completed)    Allergen jefferson white IgE (Completed)    Allergen [place] IgE (Completed)    Allergen cottonwood IgE (Completed)    Allergen elm IgE (Completed)    Allergen maple box elder IgE (Completed)    Allergen oak white IgE (Completed)    Allergen English plantain IgE (Completed)    Allergen giant ragweed IgE (Completed)    Allergen lamb's quarter IgE (Completed)    Allergen Mugwort IgE (Completed)    Allergen ragweed short IgE (Completed)    Allergen Sagebrush Wormwood IgE (Completed)    Allergen Sheep Sorrel IgE (Completed)    Allergen thistle Russian IgE (Completed)    Allergen Weed Nettle IgE (Completed)    Allergen, Kochia/Firebush (Completed)    ALLERGY SKIN TESTS,ALLERGENS (Completed)       Other    Tree nut allergy     Diffuse itching, hives, vomiting and diarrhea after consuming tree nuts and itchy hands after consuming peanut.    Peanut 3 mm/3 mm  All tree nuts were negative, but nonreactive histamine.      -Continue to avoid peanut and tree nuts.  -Serum IgE for peanuts and tree nuts.  -An anaphylaxis action plan was given and reviewed with patient and family.   Injectable  epinephrine use was reviewed and demonstrated. The patient will need to carry injectable epinephrine.   Injectable epinephrine script provided.            Relevant Medications    loratadine (CLARITIN) 10 MG tablet    EPINEPHrine (EPIPEN 2-ROMARIO) 0.3 MG/0.3ML injection 2-pack    Other Relevant Orders    Allergen peanut IgE (Completed)    Allergen almonds IgE (Completed)    Allergen cashew IgE (Completed)    Allergen pecan nut IgE (Completed)    Allergen pistachio nut IgE (Completed)    Allergen walnuts IgE (Completed)    ALLERGY SKIN TESTS,ALLERGENS (Completed)    Shellfish allergy     History of itchy skin and nausea after consumption of crab.  Tolerates shrimp.  Unclear about lobster.    Crab 3 mm/3 mm.  Lobster nonreactive, but negative histamine.    -Continue to avoid shellfish.  -Serum IgE for shellfish.  -An anaphylaxis action plan was given and reviewed with patient and family.   Injectable epinephrine use was reviewed and demonstrated. The patient will need to carry injectable epinephrine.   Injectable epinephrine script provided.            Relevant Medications    loratadine (CLARITIN) 10 MG tablet    EPINEPHrine (EPIPEN 2-ROMARIO) 0.3 MG/0.3ML injection 2-pack    Other Relevant Orders    Allergen crab IgE (Completed)    Allergen lobster IgE (Completed)    ALLERGY SKIN TESTS,ALLERGENS (Completed)          Chart documentation with Dragon Voice recognition Software. Although reviewed after completion, some words and grammatical errors may remain.    Jesús Lowry,    Allergy/Immunology  [facility] and [facility] [place]      Again, thank you for allowing me to participate in the care of your patient.        Sincerely,        Jesús Lowry, DO

## 2018-03-27 NOTE — ASSESSMENT & PLAN NOTE
Perennial with spring and fall worsening nasal and ocular symptoms.  Current treatment is loratadine and Alaway.    Skin testing with nonreactive histamine.  Cannot interpret negative results.  Oak was positive.    - Flonase, Nasacort or Rhinocort 2 sprays/nostril daily.   - Zyrtec, Allegra or Claritin daily as needed for allergy symptoms.   - Ketotifen 1 drop/eye twice daily as needed for allergy symptoms.   - Aeroallergen avoidance measures will be provided after blood testing results.

## 2018-03-27 NOTE — ASSESSMENT & PLAN NOTE
Diffuse itching, hives, vomiting and diarrhea after consuming tree nuts and itchy hands after consuming peanut.    Peanut 3 mm/3 mm  All tree nuts were negative, but nonreactive histamine.      -Continue to avoid peanut and tree nuts.  -Serum IgE for peanuts and tree nuts.  -An anaphylaxis action plan was given and reviewed with patient and family.   Injectable epinephrine use was reviewed and demonstrated. The patient will need to carry injectable epinephrine.   Injectable epinephrine script provided.

## 2018-03-27 NOTE — LETTER
[facility]                   FOOD ALLERGY & ANAPHYLAXIS EMERGENCY CARE PLAN  Food Allergy Research & Education         Name: Sumaya R Gavi MIRB.:  [id number]    Allergy to: Peanut, tree nuts and shellfish  Weight: 209 lbs 0 oz lbs.  Asthma:  No    -NOTE: Do not depend on antihistamines or inhalers (bronchodilators) to treat a severe reaction. USE EPINEPHRINE.     MEDICATIONS/DOSES  Epinephrine Brand: EpiPen  Epinephrine Dose: 0.3 mg IM  Antihistamine Brand or Generic: Zyrtec (Cetirizine)  Antihistamine Dose: 10mg         FARE                   FOOD ALLERGY & ANAPHYLAXIS EMERGENCY CARE PLAN   Food Allergy Research & Education           EMERGENCY CONTACTS - CALL 911  DOCTOR:  Jesús Lowry DO   PHONE: [phone]  PARENT/GUARDIAN:              PHONE:  OTHER EMERGENCY CONTACTS  NAME/RELATIONSHIP:   PHONE:   NAME/RELATIONSHIP:    PHONE:           PARENT/GUARDIAN AUTHORIZATION SIGNATURE     DATE              PHYSICIAN/H CP AUTHORIZATION SIGNATURE         DATE  FORM PROVIDED COURTESY OF FOOD ALLERGY RESEARCH & EDUCATION (FARE) ([email or web address]) 2014

## 2018-03-28 LAB
A ALTERNATA IGE QN: 0.2 KU(A)/L
A FUMIGATUS IGE QN: 0.26 KU(A)/L
ALMOND IGE QN: 6.42 KU(A)/L
C HERBARUM IGE QN: 0.2 KU(A)/L
CASHEW NUT IGE QN: 4.3 KU(A)/L
CAT DANDER IGG QN: 0.28 KU(A)/L
CEDAR IGE QN: 3.6 KU(A)/L
COMMON RAGWEED IGE QN: 6.29 KU(A)/L
COTTONWOOD IGE QN: 6.04 KU(A)/L
CRAB IGE QN: 3.33 KU(A)/L
D FARINAE IGE QN: 1.66 KU(A)/L
D PTERONYSS IGE QN: 0.43 KU(A)/L
DOG DANDER+EPITH IGE QN: 1.04 KU(A)/L
E PURPURASCENS IGE QN: 0.22 KU(A)/L
EAST WHITE PINE IGE QN: 5.19 KU(A)/L
ENGL PLANTAIN IGE QN: 5.64 KU(A)/L
FIREBUSH IGE QN: 6.12 KU(A)/L
GIANT RAGWEED IGE QN: 5.57 KU(A)/L
GOOSEFOOT IGE QN: 5.9 KU(A)/L
JOHNSON GRASS IGE QN: 6.39 KU(A)/L
LOBSTER IGE QN: 1.72 KU(A)/L
MAPLE IGE QN: 6.04 KU(A)/L
MUGWORT IGE QN: 6.08 KU(A)/L
NETTLE IGE QN: 6.01 KU(A)/L
P NOTATUM IGE QN: 0.29 KU(A)/L
PEANUT IGE QN: 6.09 KU(A)/L
PECAN/HICK NUT IGE QN: 2.2 KU(A)/L
PISTACHIO IGE QN: 6.65 KU(A)/L
RED MULBERRY IGE QN: 5.74 KU(A)/L
SALTWORT IGE QN: 5.23 KU(A)/L
SHEEP SORREL IGE QN: 5.86 KU(A)/L
SILVER BIRCH IGE QN: 5.72 KU(A)/L
TIMOTHY IGE QN: 6.18 KU(A)/L
WALNUT IGE QN: 5.91 KU(A)/L
WHITE ASH IGE QN: 6.25 KU(A)/L
WHITE ELM IGE QN: 6.21 KU(A)/L
WHITE MULBERRY IGE QN: 5.36
WHITE OAK IGE QN: 5.68 KU(A)/L
WORMWOOD IGE QN: 5.85 KU(A)/L

## 2018-03-30 LAB
CALIF WALNUT IGE QN: 6.89 KU/L
DEPRECATED MISC ALLERGEN IGE RAST QL: NORMAL

## 2018-03-31 NOTE — PROGRESS NOTES
Allergy testing positive for cat, dog, dust mites, molds, trees, weeds and grass. See avoidance measures noted below.     Testing positive for tree nuts, shellfish and peanut. Avoid these foods.   Plan as discussed in clinic. Consider allergy shots for allergy treatment. This could significantly improve allergies overall. Thanks.     Dr. Lowry

## 2018-04-09 ENCOUNTER — MYC MEDICAL ADVICE (OUTPATIENT)
Dept: FAMILY MEDICINE | Facility: OTHER | Age: 48
End: 2018-04-09

## 2018-04-09 NOTE — TELEPHONE ENCOUNTER
Spoke with patient appointment scheduled     4/10/2018 8:45  Closing encounter  Nettie Rodriguez RT (R)

## 2018-05-29 ENCOUNTER — TELEPHONE (OUTPATIENT)
Dept: FAMILY MEDICINE | Facility: OTHER | Age: 48
End: 2018-05-29

## 2018-05-29 NOTE — TELEPHONE ENCOUNTER
Sumaya Gatica is a 47 year old female who calls with abdominal pain.    NURSING ASSESSMENT:  Description:  Having abdominal discomfort, dull ache with standing. When bending over starts to becoming's more irritating. Right side, at the base of the ribs. Spouse encouraged calling the clinic. Did more than normal yard work this weekend in the heat. BMs per norm. Denies faint, lightheaded, dizzy, fevers, cool/pale/sweaty, nausea, vomiting, VBM changes, severe pain, vaginal symptoms.   Onset/duration:  Started last night   Precip. factors: increased yard work over the weekend  Associated symptoms:  Dull ache, right mid adominal  Improves/worsens symptoms:  Same   Pain scale (0-10)   1/10 dull ache   LMP/preg/breast feeding:  Patient's last menstrual period was 06/15/2015.  Last exam/Treatment:  01/12/2018  Allergies:   Allergies   Allergen Reactions     No Known Drug Allergies      Seasonal Allergies      NURSING PLAN: Nursing advice to patient to try home care measures, call with ongoing or worsening symptoms    RECOMMENDED DISPOSITION:  Home care advice - abdominal pain  Will comply with recommendation: Yes  If further questions/concerns or if symptoms do not improve, worsen or new symptoms develop, call your PCP or [facility] as soon as possible.    NOTES:  Disposition was determined by the first positive assessment question, therefore all previous assessment questions were negative    Guideline used:  Telephone Triage Protocols for Nurses, Fifth Edition, Lola Huitron  Abdominal pain, adult  Nursing Judgment    Esther John RN, BSN

## 2018-06-25 ENCOUNTER — E-VISIT (OUTPATIENT)
Dept: FAMILY MEDICINE | Facility: OTHER | Age: 48
End: 2018-06-25
Payer: COMMERCIAL

## 2018-06-25 DIAGNOSIS — J01.00 ACUTE NON-RECURRENT MAXILLARY SINUSITIS: ICD-10-CM

## 2018-06-25 PROCEDURE — 98969 ZZC NONPHYSICIAN ONLINE ASSESSMENT AND MANAGEMENT: CPT | Performed by: PHYSICIAN ASSISTANT

## 2018-06-25 RX ORDER — AMOXICILLIN 875 MG
875 TABLET ORAL 2 TIMES DAILY
Qty: 20 TABLET | Refills: 0 | Status: SHIPPED | OUTPATIENT
Start: 2018-06-25 | End: 2018-11-02

## 2018-06-25 RX ORDER — FLUCONAZOLE 150 MG/1
150 TABLET ORAL ONCE
Qty: 1 TABLET | Refills: 0 | Status: SHIPPED | OUTPATIENT
Start: 2018-06-25 | End: 2018-06-25

## 2018-06-29 ENCOUNTER — TELEPHONE (OUTPATIENT)
Dept: FAMILY MEDICINE | Facility: OTHER | Age: 48
End: 2018-06-29

## 2018-06-29 ENCOUNTER — VIRTUAL VISIT (OUTPATIENT)
Dept: FAMILY MEDICINE | Facility: OTHER | Age: 48
End: 2018-06-29
Payer: COMMERCIAL

## 2018-06-29 DIAGNOSIS — J01.90 ACUTE SINUSITIS WITH SYMPTOMS > 10 DAYS: Primary | ICD-10-CM

## 2018-06-29 PROCEDURE — 98966 PH1 ASSMT&MGMT NQHP 5-10: CPT | Performed by: STUDENT IN AN ORGANIZED HEALTH CARE EDUCATION/TRAINING PROGRAM

## 2018-06-29 RX ORDER — DOXYCYCLINE 100 MG/1
100 CAPSULE ORAL 2 TIMES DAILY
Qty: 20 CAPSULE | Refills: 0 | Status: SHIPPED | OUTPATIENT
Start: 2018-06-29 | End: 2018-11-02

## 2018-06-29 RX ORDER — METHYLPREDNISOLONE 4 MG
TABLET, DOSE PACK ORAL
Qty: 21 TABLET | Refills: 0 | Status: SHIPPED | OUTPATIENT
Start: 2018-06-29 | End: 2018-11-02

## 2018-06-29 NOTE — PROGRESS NOTES
"Sumaya Gatica is a 47 year old female who is being evaluated via a telephone visit.      The patient has been notified of following:     \"This telephone visit will be conducted via a call between you and your physician/provider. We have found that certain health care needs can be provided without the need for a physical exam.  This service lets us provide the care you need with a short phone conversation.  If a prescription is necessary we can send it directly to your pharmacy.  If lab work is needed we can place an order for that and you can then stop by our lab to have the test done at a later time.    We will bill your insurance company for this service.  Please check with your medical insurance if this type of visit is covered. You may be responsible for the cost of this type of visit if insurance coverage is denied.  The typical cost is $30 (10min), $59 (11-20min) and $85 (21-30min).  Most often these visits are shorter than 10 minutes.    If during the course of the call the physician/provider feels a telephone visit is not appropriate, you will not be charged for this service.\"       Consent has been obtained for this service by care team member: yes.   See the scanned image in the medical record.    Sumaya Gatica complains of  Sinus Problem      I have reviewed and updated the patient's Past Medical History, Social History, Family History and Medication List.    ALLERGIES  No known drug allergies and Seasonal allergies    Alexandria Henry CMA    Additional provider notes:   Did Sudafed for 3 days prior to visit with Tiffanie Montes. Started high-dose amoxicillin and had one day in the past 5 days where her pressure-related headache seemed to improve. However, today she feels that her symptoms have regressed and the \"headache came back with a vengeance\". She is very congested, has a lot of pressure in her cheeks, forehead and behind eyes. She also has a lot of drainage.       Assessment/Plan:  1. Acute " sinusitis with symptoms > 10 days  Will stop amoxicillin and start doxycycline. Also will add medrol dose pack in hopes that this will reduce inflammation to allow for healing. Follow up if symptoms persist or fail to improve.  - doxycycline (VIBRAMYCIN) 100 MG capsule; Take 1 capsule (100 mg) by mouth 2 times daily  Dispense: 20 capsule; Refill: 0  - methylPREDNISolone (MEDROL DOSEPAK) 4 MG tablet; Follow package instructions  Dispense: 21 tablet; Refill: 0      I have reviewed the note as documented above.  This accurately captures the substance of my conversation with the patient, Sumaya Gatica      Total time of call between patient and provider was 5 minutes

## 2018-06-29 NOTE — TELEPHONE ENCOUNTER
Spoke to patient, patient agreed to telephone visit. Phone visit added onto EM's schedule for today.

## 2018-06-29 NOTE — TELEPHONE ENCOUNTER
Evisit was for sinus infection.      Per PFA she should be seen.  Would you want to do a phone visit or another e-visit?    Garcia Laureano, RN, BSN

## 2018-06-29 NOTE — MR AVS SNAPSHOT
After Visit Summary   6/29/2018    Sumaya Gatica    MRN: [id number]           Patient Information     Date Of Birth          1970        Visit Information        Provider Department      6/29/2018 3:00 PM Mireille Woodruff APRN CNP [facility]        Today's Diagnoses     Acute sinusitis with symptoms > 10 days    -  1       Follow-ups after your visit        Who to contact     If you have questions or need follow up information about today's clinic visit or your schedule please contact [facility] directly at [phone].  Normal or non-critical lab and imaging results will be communicated to you by [employer], letter or phone within 4 business days after the clinic has received the results. If you do not hear from us within 7 days, please contact the clinic through [employer] or phone. If you have a critical or abnormal lab result, we will notify you by phone as soon as possible.  Submit refill requests through [employer] or call your pharmacy and they will forward the refill request to us. Please allow 3 business days for your refill to be completed.          Additional Information About Your Visit        MyChart Information     [employer] gives you secure access to your electronic health record. If you see a primary care provider, you can also send messages to your care team and make appointments. If you have questions, please call your primary care clinic.  If you do not have a primary care provider, please call [phone] and they will assist you.        Care EveryWhere ID     This is your Care EveryWhere ID. This could be used by other organizations to access your [facility] medical records  [id number]        Your Vitals Were     Last Period                   06/15/2015            Blood Pressure from Last 3 Encounters:   03/27/18 118/78   01/12/18 124/83   10/13/17 122/80    Weight from Last 3 Encounters:   03/27/18 209 lb (94.8 kg)   01/12/18 207 lb (93.9  kg)   10/13/17 207 lb 3.2 oz (94 kg)              Today, you had the following     No orders found for display         Today's Medication Changes          These changes are accurate as of 6/29/18  3:03 PM.  If you have any questions, ask your nurse or doctor.               Start taking these medicines.        Dose/Directions    doxycycline 100 MG capsule   Commonly known as:  VIBRAMYCIN   Used for:  Acute sinusitis with symptoms > 10 days   Started by:  Mireille Woodruff APRN CNP        Dose:  100 mg   Take 1 capsule (100 mg) by mouth 2 times daily   Quantity:  20 capsule   Refills:  0       methylPREDNISolone 4 MG tablet   Commonly known as:  MEDROL DOSEPAK   Used for:  Acute sinusitis with symptoms > 10 days   Started by:  Mireille Woodruff APRN CNP        Follow package instructions   Quantity:  21 tablet   Refills:  0            Where to get your medicines      These medications were sent to [facility] [address] [place] [address], [place] - [address] [address]     Phone:  [phone]     doxycycline 100 MG capsule    methylPREDNISolone 4 MG tablet                Primary Care Provider Office Phone # Fax #    Tiffanie Montes PA-C [phone] [address] [place] [address]        Equal Access to Services     NATHANIEL PHELAN : Hadii aad ku hadasho Soomaali, waaxda luqadaha, qaybta kaalmada adeegyada, donny baptiste. So [facility] [phone].    ATENCIÓN: Si habla español, tiene a vargas disposición servicios gratuitos de asistencia lingüística. Harry al [phone].    We comply with applicable federal civil rights laws and [place] laws. We do not discriminate on the basis of race, color, national origin, age, disability, sex, sexual orientation, or gender identity.            Thank you!     Thank you for choosing [facility]  for your care. Our goal is always to provide you with excellent care. Hearing back from our  patients is one way we can continue to improve our services. Please take a few minutes to complete the written survey that you may receive in the mail after your visit with us. Thank you!             Your Updated Medication List - Protect others around you: Learn how to safely use, store and throw away your medicines at www.disposemymeds.org.          This list is accurate as of 6/29/18  3:03 PM.  Always use your most recent med list.                   Brand Name Dispense Instructions for use Diagnosis    amoxicillin 875 MG tablet    AMOXIL    20 tablet    Take 1 tablet (875 mg) by mouth 2 times daily    Acute non-recurrent maxillary sinusitis       calcium carbonate 500 MG tablet    OS-EIDS 500 mg [place]. [place]     Take 1 tablet by mouth daily        doxycycline 100 MG capsule    VIBRAMYCIN    20 capsule    Take 1 capsule (100 mg) by mouth 2 times daily    Acute sinusitis with symptoms > 10 days       EPINEPHrine 0.3 MG/0.3ML injection 2-pack    EPIPEN 2-ROMARIO    0.6 mL    Inject 0.3 mLs (0.3 mg) into the muscle as needed for anaphylaxis    Tree nut allergy, Shellfish allergy       escitalopram 20 MG tablet    LEXAPRO    90 tablet    TAKE ONE TABLET BY MOUTH DAILY    Anxiety       ibuprofen 200 MG tablet    ADVIL/MOTRIN     Take 400 mg by mouth every 4 hours as needed Reported on 2/27/2017        loratadine 10 MG tablet    CLARITIN     Take 10 mg by mouth daily        methylPREDNISolone 4 MG tablet    MEDROL DOSEPAK    21 tablet    Follow package instructions    Acute sinusitis with symptoms > 10 days       NALTREXONE HCL PO      Take 12.5 mg by mouth daily        UNABLE TO FIND      Apply topically daily MEDICATION NAME: testosterone cream        UNABLE TO FIND      Place 1 drop into both eyes daily MEDICATION NAME: [place] 0.035%        VITAMIN D (CHOLECALCIFEROL) PO      Take 10,000 Units by mouth daily

## 2018-06-29 NOTE — TELEPHONE ENCOUNTER
Reason for Call:  Medication or medication refill:    Do you use a [facility]?  Name of the pharmacy and phone number for the current request:  [facility] [place]    Name of the medication requested: antibiotic     Other request: patient had a e visit and she states she still has sx patient is on her way out of town and is wanting another medication called in. Patient states can't do another appointment she is in the car heading out of town.     Can we leave a detailed message on this number? YES    Phone number patient can be reached at:     Telephone Information:   Mobile [phone]       Best Time: anytime    Call taken on 6/29/2018 at 1:52 PM by Angela He

## 2018-08-06 ENCOUNTER — MYC MEDICAL ADVICE (OUTPATIENT)
Dept: FAMILY MEDICINE | Facility: OTHER | Age: 48
End: 2018-08-06

## 2018-08-06 NOTE — TELEPHONE ENCOUNTER
Responded via [employer]  Please see  Sridhar's chart  Closing encounter  Nettie Rodriguez RT (R)

## 2018-10-30 NOTE — PROGRESS NOTES
SUBJECTIVE:   Sumaya Gatica is a 48 year old female who presents to clinic today for the following health issues:      Answers for HPI/ROS submitted by the patient on 11/2/2018   If you checked off any problems, how difficult have these problems made it for you to do your work, take care of things at home, or get along with other people?: Somewhat difficult  PHQ9 TOTAL SCORE: 7  GEOVANY 7 TOTAL SCORE: 5    HPI  Concern - Bilateral Breast tenderness   Onset: spring     Description:   Noticing tenderness, not pain since spring. In the spring both breasts were very itchy but that has mostly subsided. Feels kind of heavy and sensitive.  No rashes, lumps or discharge notices.     Intensity: moderate    Progression of Symptoms:  worsening    Therapies Tried and outcome:     She states symptoms started shortly after starting to take naltrexone HCL for anxiety she is prescribed this through a holistic provider. She states she will hold this for now to see is symptoms change.   She is a non smoker, denies symptoms of chest pain or sob.     Problem list and histories reviewed & adjusted, as indicated.  Additional history: as documented    Patient Active Problem List   Diagnosis     Allergic rhinitis     Herpes simplex virus (HSV) infection     Abnormal glandular Papanicolaou smear of cervix     Lumbago     Anxiety     CARDIOVASCULAR SCREENING; LDL GOAL LESS THAN 160     Dermatitis     Rosacea     Acne vulgaris     Migraine without aura and without status migrainosus, not intractable     Female stress incontinence     Urinary urgency     Insomnia, unspecified type     Chronic seasonal allergic rhinitis due to pollen     Tree nut allergy     Shellfish allergy     Uterine prolapse     Past Surgical History:   Procedure Laterality Date     COLONOSCOPY  6/22/2012    Procedure: COLONOSCOPY;  Colonscopy Screening, Diverticulitis;  Surgeon: Reilly Holt MD;  Location:  GI     DILATION AND CURETTAGE, HYSTEROSCOPY, ABLATE  ENDOMETRIUM NOVASURE, COMBINED  2011    Procedure:COMBINED DILATION AND CURETTAGE, HYSTEROSCOPY, ABLATE ENDOMETRIUM NOVASURE; hysteroscopy, dialtion and curettage, novasure endometrial ablation  ; Surgeon:MILAD VILLARREAL; Location:[facility] OR     GYN SURGERY      [facility]      HC REMOVAL OF TONSILS,<11 Y/O       HC REPAIR OF NASAL SEPTUM  08    Septoplasty, submucosal resection inferior turbinates.       Social History   Substance Use Topics     Smoking status: Never Smoker     Smokeless tobacco: Never Used     Alcohol use 0.0 oz/week     0 Standard drinks or equivalent per week      Comment: occ.     Family History   Problem Relation Age of Onset     Alcohol/Drug Paternal Grandfather      alcohlism     Other Cancer Paternal Grandfather      Cancer Maternal Grandfather      gallbladder     Diabetes Maternal Grandmother           Cerebrovascular Disease Paternal Grandmother      Alzheimer Disease Paternal Grandmother      and dementia     Prostate Cancer Brother      Diabetes Mother      Alzheimer Disease Mother      Cancer Brother      skin         Current Outpatient Prescriptions   Medication Sig Dispense Refill     calcium carbonate (OS-EDIS 500 MG [place]. [place]) 1250 MG tablet Take 1 tablet by mouth daily       EPINEPHrine (EPIPEN 2-ROMARIO) 0.3 MG/0.3ML injection 2-pack Inject 0.3 mLs (0.3 mg) into the muscle as needed for anaphylaxis 0.6 mL 1     ibuprofen (ADVIL,MOTRIN) 200 MG tablet Take 400 mg by mouth every 4 hours as needed Reported on 2017       loratadine (CLARITIN) 10 MG tablet Take 10 mg by mouth daily       NALTREXONE HCL PO Take 12.5 mg by mouth daily       UNABLE TO FIND Apply topically daily MEDICATION NAME: testosterone cream       UNABLE TO FIND Place 1 drop into both eyes daily MEDICATION NAME: Alaway 0.035%       VITAMIN D, CHOLECALCIFEROL, PO Take 10,000 Units by mouth daily       Allergies   Allergen Reactions     No Known Drug Allergies      Peanuts [Nuts] Hives     Seasonal  "Allergies      Shellfish-Derived Products Itching     BP Readings from Last 3 Encounters:   11/02/18 118/76   03/27/18 118/78   01/12/18 124/83    Wt Readings from Last 3 Encounters:   11/02/18 195 lb 14.4 oz (88.9 kg)   03/27/18 209 lb (94.8 kg)   01/12/18 207 lb (93.9 kg)                  Labs reviewed in EPIC    ROS:  Constitutional, HEENT, cardiovascular, pulmonary, gi and gu systems are negative, except as otherwise noted.    OBJECTIVE:     /76  Pulse 88  Temp 99  F (37.2  C)  Resp 18  Ht 5' 8\" (1.727 m)  Wt 195 lb 14.4 oz (88.9 kg)  LMP 06/15/2015  SpO2 96%  BMI 29.79 kg/m2  Body mass index is 29.79 kg/(m^2).  GENERAL: healthy, alert and no distress  EYES: Eyes grossly normal to inspection, PERRL and conjunctivae and sclerae normal  HENT: ear canals and TM's normal, nose and mouth without ulcers or lesions  NECK: no adenopathy, no asymmetry, masses, or scars and thyroid normal to palpation  RESP: lungs clear to auscultation - no rales, rhonchi or wheezes  BREAST: no palpable axillary masses or adenopathy, fibrocystic changes bilateral, mass left breast at 9 oclock and tenderness bilateral lateral breasts are most tender.   CV: regular rate and rhythm, normal S1 S2, no S3 or S4, no murmur, click or rub, no peripheral edema and peripheral pulses strong  ABDOMEN: soft, nontender, no hepatosplenomegaly, no masses and bowel sounds normal  MS: no gross musculoskeletal defects noted, no edema  SKIN: no suspicious lesions or rashes  NEURO: Normal strength and tone, mentation intact and speech normal  BACK: no CVA tenderness, no paralumbar tenderness  PSYCH: mentation appears normal, affect normal/bright  LYMPH: no lymphadenopathy.     ASSESSMENT/PLAN:     1. Pain of both breasts    - US Breast Left Complete 4 Quadrants; Future  - MA Diagnostic Digital Bilateral; Future    2. Breast lump on left side at 9 o'clock position    - MA Diagnostic Digital Bilateral; Future    Patient Instructions   Please follow " up with ultra sound and mammogram as discussed.     Return to clinic if symptoms do not improve or worsen.     Will notify you with results.     Thank you  Davida Giraldo CNP        [facility]

## 2018-11-02 ENCOUNTER — OFFICE VISIT (OUTPATIENT)
Dept: FAMILY MEDICINE | Facility: OTHER | Age: 48
End: 2018-11-02
Payer: COMMERCIAL

## 2018-11-02 VITALS
SYSTOLIC BLOOD PRESSURE: 118 MMHG | BODY MASS INDEX: 29.69 KG/M2 | WEIGHT: 195.9 LBS | HEIGHT: 68 IN | RESPIRATION RATE: 18 BRPM | HEART RATE: 88 BPM | OXYGEN SATURATION: 96 % | TEMPERATURE: 99 F | DIASTOLIC BLOOD PRESSURE: 76 MMHG

## 2018-11-02 DIAGNOSIS — N64.4 PAIN OF BOTH BREASTS: Primary | ICD-10-CM

## 2018-11-02 DIAGNOSIS — N63.25 BREAST LUMP ON LEFT SIDE AT 9 O'CLOCK POSITION: ICD-10-CM

## 2018-11-02 PROCEDURE — 99213 OFFICE O/P EST LOW 20 MIN: CPT | Performed by: NURSE PRACTITIONER

## 2018-11-02 ASSESSMENT — ANXIETY QUESTIONNAIRES
4. TROUBLE RELAXING: SEVERAL DAYS
GAD7 TOTAL SCORE: 5
GAD7 TOTAL SCORE: 5
2. NOT BEING ABLE TO STOP OR CONTROL WORRYING: SEVERAL DAYS
1. FEELING NERVOUS, ANXIOUS, OR ON EDGE: SEVERAL DAYS
3. WORRYING TOO MUCH ABOUT DIFFERENT THINGS: SEVERAL DAYS
6. BECOMING EASILY ANNOYED OR IRRITABLE: NOT AT ALL
5. BEING SO RESTLESS THAT IT IS HARD TO SIT STILL: SEVERAL DAYS
GAD7 TOTAL SCORE: 5
7. FEELING AFRAID AS IF SOMETHING AWFUL MIGHT HAPPEN: NOT AT ALL
7. FEELING AFRAID AS IF SOMETHING AWFUL MIGHT HAPPEN: NOT AT ALL

## 2018-11-02 ASSESSMENT — PATIENT HEALTH QUESTIONNAIRE - PHQ9
10. IF YOU CHECKED OFF ANY PROBLEMS, HOW DIFFICULT HAVE THESE PROBLEMS MADE IT FOR YOU TO DO YOUR WORK, TAKE CARE OF THINGS AT HOME, OR GET ALONG WITH OTHER PEOPLE: SOMEWHAT DIFFICULT
SUM OF ALL RESPONSES TO PHQ QUESTIONS 1-9: 7
SUM OF ALL RESPONSES TO PHQ QUESTIONS 1-9: 7

## 2018-11-02 NOTE — MR AVS SNAPSHOT
After Visit Summary   11/2/2018    Sumaya Gatica    MRN: [id number]           Patient Information     Date Of Birth          1970        Visit Information        Provider Department      11/2/2018 4:00 PM Davida Giraldo APRN CNP [facility]        Today's Diagnoses     Pain of both breasts    -  1    Breast lump on left side at 9 o'clock position          Care Instructions    Please follow up with ultra sound and mammogram as discussed.     Return to clinic if symptoms do not improve or worsen.     Will notify you with results.     Thank you  Davida Giraldo CNP            Follow-ups after your visit        Follow-up notes from your care team     Return in about 6 weeks (around 12/14/2018).      Your next 10 appointments already scheduled     Nov 15, 2018  2:00 PM CST   MA DIAGNOSTIC DIGITAL BILATERAL with PHMA1, PH RAD   [facility] ([facility])    [address] [place] [id number] [phone]           How do I prepare for my exam? (Food and drink instructions) No Food and Drink Restrictions.  How do I prepare for my exam? (Other instructions) Do not use any powder, lotion or deodorant under your arms or on your breast. If you do, we will ask you to remove it before your exam.  What should I wear: Wear comfortable, two-piece clothing.  How long does the exam take: Most scans will take 15 minutes.  What should I bring: Bring any previous mammograms from other facilities or have them mailed to the breast center.  Do I need a :  No  is needed.  What do I need to tell my doctor: If you have any allergies, tell your care team.  What should I do after the exam: No restrictions, You may resume normal activities.  What is this test: This test is an x-ray of the breast to look for breast disease. The breast is pressed between two plates to flatten and spread the tissue. An X-ray is taken of the breast from different  "angles.  Who should I call with questions: If you have any questions, please call the Imaging Department where you will have your exam. Directions, parking instructions, and other information is available on our website, [email or web address].  Other information about my exam Three-dimensional (3D) mammograms are available at [facility] locations in [facility], [facility], [facility], [place], and [place].  Health locations include [place] and the [facility] in [place].  Benefits of 3D mammograms include: * Improved rate of cancer detection * Decreases your chance of having to go back for more tests, which means fewer: * \"False-positive\" results (This means that there is an abnormal area but it isn't cancer.) * Invasive testing procedures, such as a biopsy or surgery * Can provide clearer images of the breast if you have dense breast tissue.  *3D mammography is an optional exam that anyone can have with a 2D mammogram. It doesn't replace or take the place of a 2D mammogram. 2D mammograms remain an effective screening test for all women.  Not all insurance companies cover the cost of a 3D mammogram. Check with your insurance.            Nov 15, 2018  2:20 PM CST   (Arrive by 2:10 PM)   US BREAST LEFT CMPL 4 QUAD with PHUS1, PH RAD   [facility] ([facility])    [address] [place] [id number] [phone]           How do I prepare for my exam? (Food and drink instructions) No Food and Drink Restrictions.  How do I prepare for my exam? (Other instructions) You do not need to do anything special to prepare for your exam.  What should I wear: Wear comfortable clothes.  How long does the exam take: Most ultrasounds take 30 to 60 minutes.  What should I bring: Bring a list of your medicines, including vitamins, minerals and over-the-counter drugs. It is safest to leave personal items at home.  Do I need a :  No  is " needed.  What do I need to tell my doctor: Tell your doctor about any allergies you may have.  What should I do after the exam: No restrictions, You may resume normal activities.  What is this test: An ultrasound uses sound waves to make pictures of the body. Sound waves do not cause pain. The only discomfort may be the pressure of the wand against your skin or full bladder.  Who should I call with questions: If you have any questions, please call the Imaging Department where you will have your exam. Directions, parking instructions, and other information is available on our website, [email or web address].              Future tests that were ordered for you today     Open Future Orders        Priority Expected Expires Ordered    US Breast Left Complete 4 Quadrants Routine  11/2/2019 11/2/2018    MA Diagnostic Digital Bilateral Routine  11/2/2019 11/2/2018            Who to contact     If you have questions or need follow up information about today's clinic visit or your schedule please contact [facility] directly at [phone].  Normal or non-critical lab and imaging results will be communicated to you by [employer], letter or phone within 4 business days after the clinic has received the results. If you do not hear from us within 7 days, please contact the clinic through [employer] or phone. If you have a critical or abnormal lab result, we will notify you by phone as soon as possible.  Submit refill requests through [facility] or call your pharmacy and they will forward the refill request to us. Please allow 3 business days for your refill to be completed.          Additional Information About Your Visit        [facility] Information     [facility] gives you secure access to your electronic health record. If you see a primary care provider, you can also send messages to your care team and make appointments. If you have questions, please call your primary care clinic.  If you do not have a primary care provider,  "please call [phone] and they will assist you.        Care EveryWhere ID     This is your Care EveryWhere ID. This could be used by other organizations to access your [facility] medical records  [id number]        Your Vitals Were     Pulse Temperature Respirations Height Last Period Pulse Oximetry    88 99  F (37.2  C) 18 5' 8\" (1.727 m) 06/15/2015 96%    BMI (Body Mass Index)                   29.79 kg/m2            Blood Pressure from Last 3 Encounters:   11/02/18 118/76   03/27/18 118/78   01/12/18 124/83    Weight from Last 3 Encounters:   11/02/18 195 lb 14.4 oz (88.9 kg)   03/27/18 209 lb (94.8 kg)   01/12/18 207 lb (93.9 kg)               Primary Care Provider Office Phone # Fax #    Tiffanie Montes PA-C [phone] [address] [place] [address]        Equal Access to Services     [facility]: Hadii aad ku hadasho Soomaali, waaxda luqadaha, qaybta kaalmada adeegyada, waxay marco ain felicitas hernandez . So [facility] [phone].    ATENCIÓN: Si habla español, tiene a vargas disposición servicios gratuitos de asistencia lingüística. Ll[facility] [phone].    We comply with applicable federal civil rights laws and [place] laws. We do not discriminate on the basis of race, color, national origin, age, disability, sex, sexual orientation, or gender identity.            Thank you!     Thank you for choosing [facility]  for your care. Our goal is always to provide you with excellent care. Hearing back from our patients is one way we can continue to improve our services. Please take a few minutes to complete the written survey that you may receive in the mail after your visit with us. Thank you!             Your Updated Medication List - Protect others around you: Learn how to safely use, store and throw away your medicines at www.disposemymeds.org.          This list is accurate as of 11/2/18  4:35 PM.  Always use your most recent med list.                   Brand " Name Dispense Instructions for use Diagnosis    calcium carbonate 500 mg (elemental) 500 MG tablet    OS-EDIS     Take 1 tablet by mouth daily        EPINEPHrine 0.3 MG/0.3ML injection 2-pack    EPIPEN 2-ROMARIO    0.6 mL    Inject 0.3 mLs (0.3 mg) into the muscle as needed for anaphylaxis    Tree nut allergy, Shellfish allergy       ibuprofen 200 MG tablet    ADVIL/MOTRIN     Take 400 mg by mouth every 4 hours as needed Reported on 2/27/2017        loratadine 10 MG tablet    CLARITIN     Take 10 mg by mouth daily        NALTREXONE HCL PO      Take 12.5 mg by mouth daily        UNABLE TO FIND      Apply topically daily MEDICATION NAME: testosterone cream        UNABLE TO FIND      Place 1 drop into both eyes daily MEDICATION NAME: [place] 0.035%        VITAMIN D (CHOLECALCIFEROL) PO      Take 10,000 Units by mouth daily

## 2018-11-02 NOTE — PATIENT INSTRUCTIONS
Please follow up with ultra sound and mammogram as discussed.     Return to clinic if symptoms do not improve or worsen.     Will notify you with results.     Thank you  Davida Giraldo CNP

## 2018-11-03 ASSESSMENT — ANXIETY QUESTIONNAIRES: GAD7 TOTAL SCORE: 5

## 2018-11-03 ASSESSMENT — PATIENT HEALTH QUESTIONNAIRE - PHQ9: SUM OF ALL RESPONSES TO PHQ QUESTIONS 1-9: 7

## 2018-12-04 ENCOUNTER — HOSPITAL ENCOUNTER (OUTPATIENT)
Dept: MAMMOGRAPHY | Facility: CLINIC | Age: 48
End: 2018-12-04
Attending: NURSE PRACTITIONER
Payer: COMMERCIAL

## 2018-12-04 ENCOUNTER — HOSPITAL ENCOUNTER (OUTPATIENT)
Dept: ULTRASOUND IMAGING | Facility: CLINIC | Age: 48
Discharge: HOME | End: 2018-12-04
Attending: NURSE PRACTITIONER | Admitting: NURSE PRACTITIONER
Payer: COMMERCIAL

## 2018-12-04 DIAGNOSIS — N63.25 BREAST LUMP ON LEFT SIDE AT 9 O'CLOCK POSITION: ICD-10-CM

## 2018-12-04 DIAGNOSIS — N64.4 PAIN OF BOTH BREASTS: ICD-10-CM

## 2018-12-04 PROCEDURE — 77066 DX MAMMO INCL CAD BI: CPT

## 2018-12-04 PROCEDURE — 76641 ULTRASOUND BREAST COMPLETE: CPT | Mod: LT

## 2019-03-11 ENCOUNTER — OFFICE VISIT (OUTPATIENT)
Dept: FAMILY MEDICINE | Facility: OTHER | Age: 49
End: 2019-03-11
Payer: COMMERCIAL

## 2019-03-11 VITALS
BODY MASS INDEX: 30.87 KG/M2 | HEART RATE: 74 BPM | DIASTOLIC BLOOD PRESSURE: 84 MMHG | HEIGHT: 67 IN | SYSTOLIC BLOOD PRESSURE: 124 MMHG | WEIGHT: 196.7 LBS | RESPIRATION RATE: 16 BRPM | TEMPERATURE: 98.1 F

## 2019-03-11 DIAGNOSIS — R10.13 EPIGASTRIC PAIN: Primary | ICD-10-CM

## 2019-03-11 DIAGNOSIS — R10.11 RUQ ABDOMINAL PAIN: ICD-10-CM

## 2019-03-11 LAB
ALBUMIN SERPL-MCNC: 3.9 G/DL (ref 3.4–5)
ALP SERPL-CCNC: 62 U/L (ref 40–150)
ALT SERPL W P-5'-P-CCNC: 25 U/L (ref 0–50)
AMYLASE SERPL-CCNC: 47 U/L (ref 30–110)
ANION GAP SERPL CALCULATED.3IONS-SCNC: 8 MMOL/L (ref 3–14)
AST SERPL W P-5'-P-CCNC: 18 U/L (ref 0–45)
BASOPHILS # BLD AUTO: 0 10E9/L (ref 0–0.2)
BASOPHILS NFR BLD AUTO: 0.2 %
BILIRUB SERPL-MCNC: 0.3 MG/DL (ref 0.2–1.3)
BUN SERPL-MCNC: 13 MG/DL (ref 7–30)
CALCIUM SERPL-MCNC: 9 MG/DL (ref 8.5–10.1)
CHLORIDE SERPL-SCNC: 102 MMOL/L (ref 94–109)
CO2 SERPL-SCNC: 28 MMOL/L (ref 20–32)
CREAT SERPL-MCNC: 0.73 MG/DL (ref 0.52–1.04)
DIFFERENTIAL METHOD BLD: NORMAL
EOSINOPHIL # BLD AUTO: 0.1 10E9/L (ref 0–0.7)
EOSINOPHIL NFR BLD AUTO: 1.1 %
ERYTHROCYTE [DISTWIDTH] IN BLOOD BY AUTOMATED COUNT: 12.3 % (ref 10–15)
GFR SERPL CREATININE-BSD FRML MDRD: >90 ML/MIN/{1.73_M2}
GLUCOSE SERPL-MCNC: 80 MG/DL (ref 70–99)
HCT VFR BLD AUTO: 39.6 % (ref 35–47)
HGB BLD-MCNC: 13.6 G/DL (ref 11.7–15.7)
LIPASE SERPL-CCNC: 143 U/L (ref 73–393)
LYMPHOCYTES # BLD AUTO: 2.8 10E9/L (ref 0.8–5.3)
LYMPHOCYTES NFR BLD AUTO: 33 %
MCH RBC QN AUTO: 31.3 PG (ref 26.5–33)
MCHC RBC AUTO-ENTMCNC: 34.3 G/DL (ref 31.5–36.5)
MCV RBC AUTO: 91 FL (ref 78–100)
MONOCYTES # BLD AUTO: 0.7 10E9/L (ref 0–1.3)
MONOCYTES NFR BLD AUTO: 8.3 %
NEUTROPHILS # BLD AUTO: 4.8 10E9/L (ref 1.6–8.3)
NEUTROPHILS NFR BLD AUTO: 57.4 %
PLATELET # BLD AUTO: 264 10E9/L (ref 150–450)
POTASSIUM SERPL-SCNC: 4.4 MMOL/L (ref 3.4–5.3)
PROT SERPL-MCNC: 7.5 G/DL (ref 6.8–8.8)
RBC # BLD AUTO: 4.35 10E12/L (ref 3.8–5.2)
SODIUM SERPL-SCNC: 138 MMOL/L (ref 133–144)
WBC # BLD AUTO: 8.4 10E9/L (ref 4–11)

## 2019-03-11 PROCEDURE — 80053 COMPREHEN METABOLIC PANEL: CPT | Performed by: PHYSICIAN ASSISTANT

## 2019-03-11 PROCEDURE — 85025 COMPLETE CBC W/AUTO DIFF WBC: CPT | Performed by: PHYSICIAN ASSISTANT

## 2019-03-11 PROCEDURE — 99214 OFFICE O/P EST MOD 30 MIN: CPT | Performed by: PHYSICIAN ASSISTANT

## 2019-03-11 PROCEDURE — 83690 ASSAY OF LIPASE: CPT | Performed by: PHYSICIAN ASSISTANT

## 2019-03-11 PROCEDURE — 36415 COLL VENOUS BLD VENIPUNCTURE: CPT | Performed by: PHYSICIAN ASSISTANT

## 2019-03-11 PROCEDURE — 82150 ASSAY OF AMYLASE: CPT | Performed by: PHYSICIAN ASSISTANT

## 2019-03-11 RX ORDER — PROGESTERONE 100 %
POWDER (GRAM) MISCELLANEOUS
COMMUNITY
Start: 2019-03-05 | End: 2020-02-19

## 2019-03-11 ASSESSMENT — ANXIETY QUESTIONNAIRES
5. BEING SO RESTLESS THAT IT IS HARD TO SIT STILL: SEVERAL DAYS
3. WORRYING TOO MUCH ABOUT DIFFERENT THINGS: SEVERAL DAYS
2. NOT BEING ABLE TO STOP OR CONTROL WORRYING: SEVERAL DAYS
1. FEELING NERVOUS, ANXIOUS, OR ON EDGE: SEVERAL DAYS
7. FEELING AFRAID AS IF SOMETHING AWFUL MIGHT HAPPEN: NOT AT ALL
GAD7 TOTAL SCORE: 5
7. FEELING AFRAID AS IF SOMETHING AWFUL MIGHT HAPPEN: NOT AT ALL
GAD7 TOTAL SCORE: 5
6. BECOMING EASILY ANNOYED OR IRRITABLE: NOT AT ALL
GAD7 TOTAL SCORE: 5
4. TROUBLE RELAXING: SEVERAL DAYS

## 2019-03-11 ASSESSMENT — MIFFLIN-ST. JEOR: SCORE: 1555.6

## 2019-03-11 ASSESSMENT — PATIENT HEALTH QUESTIONNAIRE - PHQ9
10. IF YOU CHECKED OFF ANY PROBLEMS, HOW DIFFICULT HAVE THESE PROBLEMS MADE IT FOR YOU TO DO YOUR WORK, TAKE CARE OF THINGS AT HOME, OR GET ALONG WITH OTHER PEOPLE: SOMEWHAT DIFFICULT
SUM OF ALL RESPONSES TO PHQ QUESTIONS 1-9: 2
SUM OF ALL RESPONSES TO PHQ QUESTIONS 1-9: 2

## 2019-03-11 ASSESSMENT — PAIN SCALES - GENERAL: PAINLEVEL: MILD PAIN (2)

## 2019-03-11 NOTE — PROGRESS NOTES
SUBJECTIVE:   Sumaya Gatica is a 48 year old female who presents to clinic today for the following health issues:      HPI  ABDOMINAL   PAIN     Onset: Since Friday, 3 days ago, wondered if she pulled a muscle but does not think she did anything, started when she bent over , she does  and maybe picked up a child.  She reports having gallstones, not kidney stones, in the past, during the 1980s, she did not have surgery though one passed on its own and the other gallstone was broken up with an ultrasound and the fragments passed.    Description:   Character: Sharp and Dull ache  Location: right upper quadrant  Radiation: Back    Intensity: 2/10 when resting, 10/10 when touching    Progression of Symptoms:  worsening, symptoms do seem to increase with transitioning from one position to the next    Accompanying Signs & Symptoms:  Fever/Chills?: YES- low grade- 99.1, this is very unusual for her to have a low grade temp  Gas/Bloating: no   Nausea: no   Vomitting: no   Diarrhea?: no   Constipation:no   Dysuria or Hematuria: no, none   History:   Trauma: no   Previous similar pain: no    Previous tests done: none    Precipitating factors:   Does the pain change with:     Food: no      BM: no     Urination: no     Alleviating factors:  None    Therapies Tried and outcome: None    LMP:     She avoids use of ibuprofen if she can, she does not use tobacco and has not had more alcohol than usual.  She will have 1-2 glasses of red wine most nights.    Answers for HPI/ROS submitted by the patient on 3/11/2019   If you checked off any problems, how difficult have these problems made it for you to do your work, take care of things at home, or get along with other people?: Somewhat difficult  PHQ9 TOTAL SCORE: 2  GEOVANY 7 TOTAL SCORE: 5      Problem list and histories reviewed & adjusted, as indicated.  Additional history: as documented        BP Readings from Last 3 Encounters:   03/11/19 124/84   11/02/18 118/76  "  03/27/18 118/78    Wt Readings from Last 3 Encounters:   03/11/19 89.2 kg (196 lb 11.2 oz)   11/02/18 88.9 kg (195 lb 14.4 oz)   03/27/18 94.8 kg (209 lb)                  Labs reviewed in EPIC    ROS:  CONSTITUTIONAL: Low-grade fevers  ENT/MOUTH: NEGATIVE for ear, mouth and throat problems  RESP: NEGATIVE for significant cough or SOB  CV: NEGATIVE for chest pain, palpitations or peripheral edema  GI: Upper abdominal pain  PSYCHIATRIC: NEGATIVE for changes in mood or affect    OBJECTIVE:     /84   Pulse 74   Temp 98.1  F (36.7  C) (Temporal)   Resp 16   Ht 1.703 m (5' 7.05\")   Wt 89.2 kg (196 lb 11.2 oz)   LMP 06/15/2015   BMI 30.76 kg/m    Body mass index is 30.76 kg/m .  GENERAL: healthy, alert and no distress  NECK: no adenopathy, no asymmetry, masses, or scars and thyroid normal to palpation  RESP: lungs clear to auscultation - no rales, rhonchi or wheezes  CV: regular rate and rhythm, normal S1 S2, no S3 or S4, no murmur, click or rub, no peripheral edema and peripheral pulses strong  ABDOMEN: tenderness epigastric and RUQ along the rib margin, no organomegaly or masses and bowel sounds normal  MS: no gross musculoskeletal defects noted, no edema  PSYCH: mentation appears normal, affect normal/bright    Diagnostic Test Results:  No results found for this or any previous visit (from the past 24 hour(s)).    ASSESSMENT/PLAN:       4. Epigastric pain  Differential includes gallstones, gastritis, GERD, pancreatitis, and muscle strain we will work her up with labs and ultrasound initially I do not think PPIs are indicated at this time she may try some ibuprofen to see if it helps and/or heat or ice  - CBC with platelets and differential  - Comprehensive metabolic panel (BMP + Alb, Alk Phos, ALT, AST, Total. Bili, TP)  - Lipase  - Amylase  - US Abdomen Limited; Future    5. RUQ abdominal pain    - CBC with platelets and differential  - Comprehensive metabolic panel (BMP + Alb, Alk Phos, ALT, AST, " Total. Bili, TP)  - Lipase  - Amylase  - US Abdomen Limited; Future      We will get back to her with her results as they are available  This chart documentation was completed in part with Dragon voice recognition software.  Documentation is reviewed after completion, however, some words and grammatical errors may remain.  MARIELA Bryant PA-C  [facility]  Answers for HPI/ROS submitted by the patient on 3/11/2019   If you checked off any problems, how difficult have these problems made it for you to do your work, take care of things at home, or get along with other people?: Somewhat difficult  PHQ9 TOTAL SCORE: 2  GEOVANY 7 TOTAL SCORE: 5

## 2019-03-11 NOTE — PATIENT INSTRUCTIONS
Please call [phone] to schedule imaging tests in [place],  [phone] for imaging scheduling in [place],  or [phone]  for imaging scheduling in [place]  Tiffanie Montes PA-C

## 2019-03-12 ENCOUNTER — ANCILLARY PROCEDURE (OUTPATIENT)
Dept: ULTRASOUND IMAGING | Facility: OTHER | Age: 49
End: 2019-03-12
Attending: PHYSICIAN ASSISTANT
Payer: COMMERCIAL

## 2019-03-12 DIAGNOSIS — R10.11 RUQ ABDOMINAL PAIN: ICD-10-CM

## 2019-03-12 DIAGNOSIS — R10.13 EPIGASTRIC PAIN: ICD-10-CM

## 2019-03-12 PROCEDURE — 76705 ECHO EXAM OF ABDOMEN: CPT

## 2019-03-12 ASSESSMENT — PATIENT HEALTH QUESTIONNAIRE - PHQ9: SUM OF ALL RESPONSES TO PHQ QUESTIONS 1-9: 2

## 2019-03-12 ASSESSMENT — ANXIETY QUESTIONNAIRES: GAD7 TOTAL SCORE: 5

## 2019-04-08 ENCOUNTER — VIRTUAL VISIT (OUTPATIENT)
Dept: FAMILY MEDICINE | Facility: OTHER | Age: 49
End: 2019-04-08

## 2019-04-08 NOTE — PROGRESS NOTES
"Date:   Clinician: Chip Ponce  Clinician NPI: [id number]  Patient: Sumaya Gatica  Patient : 1970  Patient Address: [address] [place] [address] [place] [address]  Patient Phone: [phone]  Visit Protocol: UTI  Patient Summary:  Sumaya is a 48 year old ( : 1970 ) female who initiated a Visit for a presumed bladder infection. When asked the question \"Please sign me up to receive news, health information and promotions from [employer].\", Sumaya responded \"No\".   Her symptoms started 1-3 days ago and consist of urinary frequency, urgency, feeling as if the bladder is never empty, dysuria, and foul-smelling urine.   Symptom details   Urine color: The color of her urine is yellow.    Denied symptoms include flank pain, abdominal pain, chills, vaginal itching, urinary incontinence, vomiting, vaginal discharge, and nausea. She does not feel feverish.   Sumaya has not used any over-the-counter medications or home remedies to relieve her current symptoms.  Precipitating events  Sumaya denies having a sexually transmitted disease.  Pertinent medical history  Sumaya has had a bladder infection before but has not had any in the past 12 months. Her current symptoms are similar to her previous bladder infection symptoms.   She is not sure what antibiotics have been effective in treating her past bladder infections.   Sumaya typically gets a yeast infection when she takes antibiotics. She has used fluconazole (Diflucan) to treat previous yeast infections. 2 doses of fluconazole (Diflucan) has typically been needed for symptoms to resolve in the past.  She has a history of kidney stones. Her last episode was more than 6 months ago.   Sumaya has not been prescribed antibiotics to prevent frequent or repeated bladder infections in the past. She has not experienced problems or side effects with any of the common antibiotics used to treat bladder infections.   She has not used a catheter or been a patient in a " hospital or nursing home in the past 2 weeks.   Sumaya does not smoke or use smokeless tobacco.   She denies pregnancy and denies breastfeeding. She does not menstruate.   MEDICATIONS: progesterone micronized oral, testosterone transdermal, ALLERGIES: NKDA  Clinician Response:  Dear Sumaya,  Based on the information you have provided, you likely have an acute urinary tract infection, also called a bladder infection. Bladder infections occur when bacteria from the outside of the body enters the urinary tract. Any part of the urinary system can be infected, but the bladder is the most common.  Medication information  I am prescribing:     Nitrofurantoin monohyd/m-cryst (Macrobid) 100 mg oral capsule. Take 1 capsule by mouth every 12 hours for 5 days. Take this medication with food. There are no refills with this prescription.   The medication I prescribed for your bladder infection is an antibiotic. Continue taking the medication until it is gone even if you feel better.   Yeast infections can be a common side effect of antibiotics. The most common symptom of a yeast infection is itchiness in and around the vagina. Other signs and symptoms include burning, redness, or a thick, white vaginal discharge that looks like cottage cheese and does not have a bad smell.  Self care  Urination helps to flush bacteria from the urinary tract. For this reason, drinking water and urinating often helps relieve some symptoms of a bladder infection and can decrease your risk of getting bladder infections in the future.  Other steps you can take to prevent future bladder infections include:     Wipe front to back after using the bathroom    Urinate after sexual intercourse    Avoid using deodorant sprays, douches, or powders in the vaginal area     When to seek care  Please make an appointment to be seen in a clinic or urgent care if any of the following occur:     You develop new symptoms or your symptoms become worse    You have  medication side effects that make it difficult to take them as prescribed    Your symptoms do not improve within 1-2 days of starting treatment    You have symptoms of a bladder infection that return shortly after completing treatment     It is possible to have an allergic reaction to an antibiotic even if you have not had one in the past. If you notice a new rash, significant swelling, or difficulty breathing, stop taking this medication immediately and go to a clinic or urgent care.   Diagnosis: Acute uncomplicated bladder infection  Diagnosis ICD: N39.0  Prescription: nitrofurantoin monohyd/m-cryst (Macrobid) 100 mg oral capsule 10 capsule, 5 days supply. Take 1 capsule by mouth every 12 hours for 5 days. Refills: 0, Refill as needed: no, Allow substitutions: yes  Pharmacy: [id number] - [phone] - [address], [place] [address]

## 2019-05-10 DIAGNOSIS — G43.009 MIGRAINE WITHOUT AURA AND WITHOUT STATUS MIGRAINOSUS, NOT INTRACTABLE: ICD-10-CM

## 2019-05-10 NOTE — TELEPHONE ENCOUNTER
Reason for Call:  Medication or medication refill:    Do you use a [facility]?  Name of the pharmacy and phone number for the current request:  [facility] - [phone]    Name of the medication requested: migraine medication did not know the name of the medication    Other request: patient has had a box of 12 of these for 3 years, but she gets migraines easily. Is wondering if she could get a refill but pharmacy will not fax. Declined an appt    Can we leave a detailed message on this number? YES    Phone number patient can be reached at: [phone]    Best Time: any    Call taken on 5/10/2019 at 11:02 AM by Lin Judge

## 2019-05-10 NOTE — TELEPHONE ENCOUNTER
"Requested Prescriptions   Pending Prescriptions Disp Refills     SUMAtriptan (IMITREX) 50 MG tablet 12 tablet 5     Sig: Take 1 tablet (50 mg) by mouth See Admin Instructions WITH ONSET OF MIGRAINE, MAY REPEAT ONCE AFTER 2 HOURS. DO NOT EXCEED 4 TABLETS IN 24 HOURS.       Serotonin Agonists Failed - 5/10/2019 11:11 AM        Failed - Serotonin Agonist request needs review.     Please review patient's record. If patient has had 8 or more treatments in the past month, please forward to provider.          Failed - Medication is active on med list        Passed - Blood pressure under 140/90 in past 12 months     BP Readings from Last 3 Encounters:   03/11/19 124/84   11/02/18 118/76   03/27/18 118/78                 Passed - Recent (12 mo) or future (30 days) visit within the authorizing provider's specialty     Patient had office visit in the last 12 months or has a visit in the next 30 days with authorizing provider or within the authorizing provider's specialty.  See \"Patient Info\" tab in inbasket, or \"Choose Columns\" in Meds & Orders section of the refill encounter.              Passed - Patient is age 18 or older        Passed - No active pregnancy on record        Passed - No positive pregnancy test in past 12 months          ,Routing refill request to provider for review/approval because:  Medication is reported/historical    Pepe Duarte, RN, BSN        "

## 2019-05-13 RX ORDER — SUMATRIPTAN 50 MG/1
50 TABLET, FILM COATED ORAL SEE ADMIN INSTRUCTIONS
Qty: 12 TABLET | Refills: 1 | Status: SHIPPED | OUTPATIENT
Start: 2019-05-13 | End: 2020-06-10

## 2019-06-26 ENCOUNTER — E-VISIT (OUTPATIENT)
Dept: FAMILY MEDICINE | Facility: OTHER | Age: 49
End: 2019-06-26
Payer: COMMERCIAL

## 2019-06-26 ENCOUNTER — NURSE TRIAGE (OUTPATIENT)
Dept: FAMILY MEDICINE | Facility: OTHER | Age: 49
End: 2019-06-26

## 2019-06-26 DIAGNOSIS — R10.9 ABDOMINAL PAIN: Primary | ICD-10-CM

## 2019-06-26 NOTE — TELEPHONE ENCOUNTER
Reason for Call:  Other appointment    Detailed comments: patient has e visit scheduled and is wondering if you could look at it asap today    Phone Number Patient can be reached at: Cell number on file:    Telephone Information:   Mobile [phone]       Best Time: asap    Can we leave a detailed message on this number? YES    Call taken on 6/26/2019 at 8:59 AM by Lin Judge

## 2019-06-26 NOTE — TELEPHONE ENCOUNTER
Usually drinks a ton of water and hasn't drank enough the last few days.  Pain the is right below her navel.  Started about a week ago and getting worse. She also has hx of diverticulitis and wondering if that is it. She also has her daughters wedding coming up this weekend as well.    advised that she should be seen in ED to rule out abdominal pain and if she was possibly dehydrated.    She agreed.    .Garcia Laureano, RN, BSN        Additional Information    Negative: Passed out (i.e., fainted, collapsed and was not responding)    Negative: Shock suspected (e.g., cold/pale/clammy skin, too weak to stand, low BP, rapid pulse)    Negative: Sounds like a life-threatening emergency to the triager    Negative: Chest pain    Negative: Pain is mainly in upper abdomen (if needed ask: 'is it mainly above the belly button?')    Negative: Abdominal pain and pregnant > 20 weeks    Negative: Abdominal pain and pregnant < 20 weeks    Negative: SEVERE abdominal pain (e.g., excruciating)    Negative: Vomiting red blood or black (coffee ground) material    Negative: Bloody, black, or tarry bowel movements    Constant abdominal pain lasting > 2 hours    Protocols used: ABDOMINAL PAIN - FEMALE-A-OH

## 2019-08-25 ENCOUNTER — MYC MEDICAL ADVICE (OUTPATIENT)
Dept: FAMILY MEDICINE | Facility: OTHER | Age: 49
End: 2019-08-25

## 2019-09-28 ENCOUNTER — HEALTH MAINTENANCE LETTER (OUTPATIENT)
Age: 49
End: 2019-09-28

## 2020-01-07 ENCOUNTER — MYC MEDICAL ADVICE (OUTPATIENT)
Dept: FAMILY MEDICINE | Facility: OTHER | Age: 50
End: 2020-01-07

## 2020-01-08 NOTE — TELEPHONE ENCOUNTER
Telephone encounter created and replied back to daughter.    Jody Powers CMA ([facility])

## 2020-02-18 ASSESSMENT — ENCOUNTER SYMPTOMS
HEARTBURN: 0
ARTHRALGIAS: 0
JOINT SWELLING: 0
CONSTIPATION: 0
SHORTNESS OF BREATH: 0
PARESTHESIAS: 0
DIZZINESS: 0
NAUSEA: 0
COUGH: 0
HEMATOCHEZIA: 0
PALPITATIONS: 0
MYALGIAS: 0
SORE THROAT: 0
HEADACHES: 0
FREQUENCY: 0
FEVER: 0
ABDOMINAL PAIN: 0
NERVOUS/ANXIOUS: 1
WEAKNESS: 0
HEMATURIA: 0
CHILLS: 0
DIARRHEA: 0
BREAST MASS: 0
DYSURIA: 0
EYE PAIN: 0

## 2020-02-18 ASSESSMENT — ANXIETY QUESTIONNAIRES
1. FEELING NERVOUS, ANXIOUS, OR ON EDGE: SEVERAL DAYS
7. FEELING AFRAID AS IF SOMETHING AWFUL MIGHT HAPPEN: SEVERAL DAYS
4. TROUBLE RELAXING: SEVERAL DAYS
3. WORRYING TOO MUCH ABOUT DIFFERENT THINGS: SEVERAL DAYS
5. BEING SO RESTLESS THAT IT IS HARD TO SIT STILL: SEVERAL DAYS
7. FEELING AFRAID AS IF SOMETHING AWFUL MIGHT HAPPEN: SEVERAL DAYS
GAD7 TOTAL SCORE: 6
2. NOT BEING ABLE TO STOP OR CONTROL WORRYING: SEVERAL DAYS
6. BECOMING EASILY ANNOYED OR IRRITABLE: NOT AT ALL

## 2020-02-18 ASSESSMENT — PATIENT HEALTH QUESTIONNAIRE - PHQ9
10. IF YOU CHECKED OFF ANY PROBLEMS, HOW DIFFICULT HAVE THESE PROBLEMS MADE IT FOR YOU TO DO YOUR WORK, TAKE CARE OF THINGS AT HOME, OR GET ALONG WITH OTHER PEOPLE: SOMEWHAT DIFFICULT
SUM OF ALL RESPONSES TO PHQ QUESTIONS 1-9: 3
SUM OF ALL RESPONSES TO PHQ QUESTIONS 1-9: 3

## 2020-02-19 ENCOUNTER — OFFICE VISIT (OUTPATIENT)
Dept: FAMILY MEDICINE | Facility: OTHER | Age: 50
End: 2020-02-19
Payer: COMMERCIAL

## 2020-02-19 VITALS
HEIGHT: 67 IN | RESPIRATION RATE: 16 BRPM | WEIGHT: 178 LBS | SYSTOLIC BLOOD PRESSURE: 126 MMHG | DIASTOLIC BLOOD PRESSURE: 70 MMHG | HEART RATE: 72 BPM | BODY MASS INDEX: 27.94 KG/M2 | TEMPERATURE: 97.6 F

## 2020-02-19 DIAGNOSIS — Z13.1 SCREENING FOR DIABETES MELLITUS: ICD-10-CM

## 2020-02-19 DIAGNOSIS — Z00.00 ENCOUNTER FOR ROUTINE ADULT HEALTH EXAMINATION WITHOUT ABNORMAL FINDINGS: Primary | ICD-10-CM

## 2020-02-19 DIAGNOSIS — Z13.6 CARDIOVASCULAR SCREENING; LDL GOAL LESS THAN 160: ICD-10-CM

## 2020-02-19 DIAGNOSIS — Z12.4 SCREENING FOR MALIGNANT NEOPLASM OF CERVIX: ICD-10-CM

## 2020-02-19 DIAGNOSIS — Z23 NEED FOR PROPHYLACTIC VACCINATION AND INOCULATION AGAINST INFLUENZA: ICD-10-CM

## 2020-02-19 DIAGNOSIS — R09.81 NASAL CONGESTION: ICD-10-CM

## 2020-02-19 DIAGNOSIS — Z12.31 ENCOUNTER FOR SCREENING MAMMOGRAM FOR BREAST CANCER: ICD-10-CM

## 2020-02-19 PROCEDURE — 99396 PREV VISIT EST AGE 40-64: CPT | Performed by: PHYSICIAN ASSISTANT

## 2020-02-19 ASSESSMENT — PATIENT HEALTH QUESTIONNAIRE - PHQ9: SUM OF ALL RESPONSES TO PHQ QUESTIONS 1-9: 3

## 2020-02-19 ASSESSMENT — ENCOUNTER SYMPTOMS
JOINT SWELLING: 0
CHILLS: 0
HEMATOCHEZIA: 0
HEARTBURN: 0
HEMATURIA: 0
NAUSEA: 0
FREQUENCY: 0
COUGH: 0
DIZZINESS: 0
ARTHRALGIAS: 0
FEVER: 0
HEADACHES: 0
SORE THROAT: 0
NERVOUS/ANXIOUS: 1
WEAKNESS: 0
ABDOMINAL PAIN: 0
DYSURIA: 0
CONSTIPATION: 0
MYALGIAS: 0
BREAST MASS: 0
EYE PAIN: 0
DIARRHEA: 0
PARESTHESIAS: 0
SHORTNESS OF BREATH: 0
PALPITATIONS: 0

## 2020-02-19 ASSESSMENT — ANXIETY QUESTIONNAIRES: GAD7 TOTAL SCORE: 6

## 2020-02-19 ASSESSMENT — MIFFLIN-ST. JEOR: SCORE: 1465.03

## 2020-02-19 ASSESSMENT — PAIN SCALES - GENERAL: PAINLEVEL: NO PAIN (0)

## 2020-03-03 ENCOUNTER — OFFICE VISIT (OUTPATIENT)
Dept: ALLERGY | Facility: OTHER | Age: 50
End: 2020-03-03
Payer: COMMERCIAL

## 2020-03-03 VITALS
HEIGHT: 67 IN | OXYGEN SATURATION: 97 % | DIASTOLIC BLOOD PRESSURE: 74 MMHG | HEART RATE: 97 BPM | SYSTOLIC BLOOD PRESSURE: 118 MMHG | BODY MASS INDEX: 28.56 KG/M2 | WEIGHT: 182 LBS | TEMPERATURE: 97 F

## 2020-03-03 DIAGNOSIS — J30.1 CHRONIC SEASONAL ALLERGIC RHINITIS DUE TO POLLEN: Primary | ICD-10-CM

## 2020-03-03 DIAGNOSIS — J30.89 ALLERGIC RHINITIS DUE TO MOLD: ICD-10-CM

## 2020-03-03 DIAGNOSIS — Z91.013 SHELLFISH ALLERGY: ICD-10-CM

## 2020-03-03 DIAGNOSIS — J30.89 ALLERGIC RHINITIS DUE TO DUST MITE: ICD-10-CM

## 2020-03-03 DIAGNOSIS — Z91.018 TREE NUT ALLERGY: ICD-10-CM

## 2020-03-03 DIAGNOSIS — J30.81 ALLERGIC RHINITIS DUE TO ANIMAL DANDER: ICD-10-CM

## 2020-03-03 PROCEDURE — 36415 COLL VENOUS BLD VENIPUNCTURE: CPT | Performed by: ALLERGY & IMMUNOLOGY

## 2020-03-03 PROCEDURE — 86003 ALLG SPEC IGE CRUDE XTRC EA: CPT | Performed by: ALLERGY & IMMUNOLOGY

## 2020-03-03 PROCEDURE — 99214 OFFICE O/P EST MOD 30 MIN: CPT | Mod: 25 | Performed by: ALLERGY & IMMUNOLOGY

## 2020-03-03 PROCEDURE — 86008 ALLG SPEC IGE RECOMB EA: CPT | Mod: 59 | Performed by: ALLERGY & IMMUNOLOGY

## 2020-03-03 RX ORDER — MONTELUKAST SODIUM 10 MG/1
10 TABLET ORAL AT BEDTIME
Qty: 30 TABLET | Refills: 11 | Status: SHIPPED | OUTPATIENT
Start: 2020-03-03 | End: 2021-02-04

## 2020-03-03 RX ORDER — FEXOFENADINE HCL AND PSEUDOEPHEDRINE HCL 180; 240 MG/1; MG/1
1 TABLET, EXTENDED RELEASE ORAL DAILY
Qty: 30 TABLET | Refills: 11 | Status: SHIPPED | OUTPATIENT
Start: 2020-03-03 | End: 2021-04-15

## 2020-03-03 RX ORDER — EPINEPHRINE 0.3 MG/.3ML
0.3 INJECTION SUBCUTANEOUS PRN
Qty: 0.6 ML | Refills: 1 | Status: SHIPPED | OUTPATIENT
Start: 2020-03-03 | End: 2021-06-15

## 2020-03-03 ASSESSMENT — PAIN SCALES - GENERAL: PAINLEVEL: NO PAIN (0)

## 2020-03-03 ASSESSMENT — MIFFLIN-ST. JEOR: SCORE: 1483.18

## 2020-03-03 NOTE — ASSESSMENT & PLAN NOTE
History of itchy skin and nausea after consumption of crab.  Tolerates shrimp.  Unclear about lobster.     Crab 3 mm/3 mm.  Lobster nonreactive, but negative histamine at prior visit. Blood testing positive for crab and lobster.      -Continue to avoid shellfish.  -Serum IgE for shellfish.  -An anaphylaxis action plan was given and reviewed with patient and family.   Injectable epinephrine use was reviewed and demonstrated. The patient will need to carry injectable epinephrine.   Injectable epinephrine script provided.

## 2020-03-03 NOTE — ASSESSMENT & PLAN NOTE
Perennial with spring and fall worsening nasal and ocular symptoms.  Current treatment is loratadine and Alaway.  Did not tolerate nasal steroid.     Skin testing with nonreactive histamine.  Cannot interpret negative results.  Oak was positive.  Blood testing positive for cat, dog, mold, trees, grass, weeds and dust mite.     - Singulair 10mg by mouth daily at night.   -Allegra-D daily  - Ketotifen 1 drop/eye twice daily as needed for allergy symptoms or Patanol (olapatdine) 1 drop/eye twice daily as needed.   - The patient has a history of allergic rhinoconjunctivitis and has exhausted all medical therapies without success in controlling symptoms. Immunotherapy was discussed as a treatment option with patient and family. Risks/benefits of immunotherapy were discussed and the patient/family wishes to proceed with allergen immunotherapy.   - The patient will be prescribed a injectable epinephrine device and will need to bring this with them to allergy shot appointments and carry with them for the rest of the day after they receive the allergy shot. Discussed signs and symptoms of a systemic reaction and when to use injectable epinephrine.   - Oral antihistamine daily prior to receiving allergy shot.

## 2020-03-03 NOTE — ASSESSMENT & PLAN NOTE
Diffuse itching, hives, vomiting and diarrhea after consuming tree nuts and itchy hands after consuming peanut.     Peanut 3 mm/3 mm  All tree nuts were negative, but nonreactive histamine at prior visit.  Blood testing positive for peanut and tree nuts.        -Continue to avoid peanut and tree nuts.  -Serum IgE for peanuts and tree nuts.  -Peanut component testing.  -An anaphylaxis action plan was given and reviewed with patient and family.   Injectable epinephrine use was reviewed and demonstrated. The patient will need to carry injectable epinephrine.   Injectable epinephrine script provided.

## 2020-03-03 NOTE — NURSING NOTE
RN educated on the symptoms and treatment of anaphylaxis. Went through the different ways that a reaction can present, and the body systems that it can affect. Instructed on when to use Epinephrine Auto Injector if she has an immunotherapy injection reaction. Patient verbalized understanding.     Writer demonstrated how to use an EpiPen auto-injector.  Patient instructed to form a fist around the auto-injector, remove blue safety release by pulling straight up, then firmly push orange tip against outer thigh so it clicks, holding for 5 seconds.  Patient advised that once used, needle will not be exposed, as orange tip extends.  Patient advised to call 911 or go to emergency department after epi-pen use for further monitoring.       Allergy immunotherapy consent was signed. Patient is aware that wait time of 30 minutes is required after injection. Understands that Epinephrine Autoinjector must be brought to each appointment. If She does not have her injector, the shot will not be given. Informed that patient is responsible for any remaining balance for allergy serum after it has been submitted to insurance.     Pallavi Carrillo RN

## 2020-03-03 NOTE — PROGRESS NOTES
Sumaya Gatica is a 49 year old White female with previous medical history significant for environmental allergies, tree nut allergy, shellfish allergy who returns for a follow up visit.     Patient returns for follow-up.  She was last seen in 2018.  Allergy testing positive for cat, dog, mold, trees, grass, weeds and dust mites.  Continues to have frequent allergy symptoms including congestion, postnasal drainage and ocular symptoms despite being on Alaway twice daily, Claritin.  Had tried nasal steroids in the past and had gagging and choking.  She has never been on montelukast.  No use of allergen immunotherapy.  History of peanut, tree nut and shellfish allergy.  No accidental exposures.  Carries injectable epinephrine.  Blood testing last done in 2018 which was positive.      Past Medical History:   Diagnosis Date     Abnormal Pap smear, can't excl hi gd sq intraepithelial lesion (ASC-H) 3/22/07    negative colposcopy     History of colposcopy with cervical biopsy 07     Ovarian cysts      Rosacea      Family History   Problem Relation Age of Onset     Alcohol/Drug Paternal Grandfather         alcohlism     Other Cancer Paternal Grandfather      Cancer Maternal Grandfather         gallbladder     Diabetes Maternal Grandmother              Cerebrovascular Disease Paternal Grandmother      Alzheimer Disease Paternal Grandmother         and dementia     Prostate Cancer Brother      Diabetes Mother      Alzheimer Disease Mother      Cancer Brother         skin     Past Surgical History:   Procedure Laterality Date     COLONOSCOPY  2012    Procedure: COLONOSCOPY;  Colonscopy Screening, Diverticulitis;  Surgeon: Reilly Holt MD;  Location:  GI     DILATION AND CURETTAGE, HYSTEROSCOPY, ABLATE ENDOMETRIUM NOVASURE, COMBINED  2011    Procedure:COMBINED DILATION AND CURETTAGE, HYSTEROSCOPY, ABLATE ENDOMETRIUM NOVASURE; hysteroscopy, dialtion and curettage, novasure endometrial ablation  ;  Surgeon:MILAD VILLARREAL; Location:[facility]     GYN SURGERY  2015    Hy      HC REMOVAL OF TONSILS,<11 Y/O       HC REPAIR OF NASAL SEPTUM  12/30/08    Septoplasty, submucosal resection inferior turbinates.     HYSTERECTOMY, PAP NO LONGER INDICATED         REVIEW OF SYSTEMS:  General: negative for weight gain. negative for weight loss. negative for changes in sleep.   Ears: negative for fullness. negative for hearing loss. negative for dizziness.   Nose: positive  for snoring.positive  for changes in smell. positive  for drainage.   Eyes: negative for eye watering. positive  for eye itching. negative for vision changes. negative for eye redness.  Throat: negative for hoarseness. negative for sore throat. negative for trouble swallowing.   Lungs: negative for shortness of breath.negative for wheezing. negative for sputum production.   Cardiovascular: negative for chest pain. negative for swelling of ankles. negative for fast or irregular heartbeat.   Gastrointestinal: negative for nausea. negative for heartburn. negative for acid reflux.   Musculoskeletal: negative for joint pain. negative for joint stiffness. negative for joint swelling.   Neurologic: negative for seizures. negative for fainting. negative for weakness.   Psychiatric: negative for changes in mood. negative for anxiety.   Endocrine: negative for cold intolerance. negative for heat intolerance. negative for tremors.   Lymphatic: negative for lower extremity swelling. negative for lymph node swelling.   Hematologic: negative for easy bruising. negative for easy bleeding.  Integumentary: negative for rash. negative for scaling. negative for nail changes.       Current Outpatient Medications:      calcium carbonate (OS-EDIS 500 MG [place]. [place]) 1250 MG tablet, Take 1 tablet by mouth daily, Disp: , Rfl:      EPINEPHrine (EPIPEN 2-ROMARIO) 0.3 MG/0.3ML injection 2-pack, Inject 0.3 mLs (0.3 mg) into the muscle as needed for anaphylaxis, Disp: 0.6 mL, Rfl: 1      fexofenadine-pseudoePHEDrine (ALLEGRA-D 24) 180-240 MG 24 hr tablet, Take 1 tablet by mouth daily, Disp: 30 tablet, Rfl: 11     ibuprofen (ADVIL,MOTRIN) 200 MG tablet, Take 400 mg by mouth every 4 hours as needed Reported on 2/27/2017, Disp: , Rfl:      loratadine (CLARITIN) 10 MG tablet, Take 10 mg by mouth daily, Disp: , Rfl:      montelukast (SINGULAIR) 10 MG tablet, Take 1 tablet (10 mg) by mouth At Bedtime, Disp: 30 tablet, Rfl: 11     SUMAtriptan (IMITREX) 50 MG tablet, Take 1 tablet (50 mg) by mouth See Admin Instructions WITH ONSET OF MIGRAINE, MAY REPEAT ONCE AFTER 2 HOURS. DO NOT EXCEED 4 TABLETS IN 24 HOURS., Disp: 12 tablet, Rfl: 1     UNABLE TO FIND, Apply topically daily MEDICATION NAME: testosterone cream, Disp: , Rfl:      UNABLE TO FIND, Place 1 drop into both eyes daily MEDICATION NAME: Alaway 0.035%, Disp: , Rfl:      VITAMIN D, CHOLECALCIFEROL, PO, Take 10,000 Units by mouth daily, Disp: , Rfl:   Immunization History   Administered Date(s) Administered     TD (ADULT, 7+) 11/02/2000     TDAP Vaccine (Adacel) 07/23/2010     Allergies   Allergen Reactions     No Known Drug Allergies      Peanuts [Nuts] Hives     Seasonal Allergies      Shellfish-Derived Products Itching         EXAM:   Constitutional:  Appears well-developed and well-nourished. No distress.   HEENT:   Head: Normocephalic.   No cobblestoning of posterior oropharynx.   Nasal tissue pink and normal appearing.  No rhinorrhea noted.    Eyes: Conjunctivae are non-erythematous   Cardiovascular: Normal rate, regular rhythm and normal heart sounds. Exam reveals no gallop and no friction rub.   No murmur heard.  Respiratory: Effort normal and breath sounds normal. No respiratory distress. No wheezes. No rales.   Musculoskeletal: Normal range of motion.   Neuro: Oriented to person, place, and time.  Skin: Skin is warm and dry. No rash noted.   Psychiatric: Normal mood and affect.     Nursing note and vitals  reviewed.    ASSESSMENT/PLAN:  Problem List Items Addressed This Visit        Respiratory    Chronic seasonal allergic rhinitis due to pollen - Primary     Perennial with spring and fall worsening nasal and ocular symptoms.  Current treatment is loratadine and Alaway.  Did not tolerate nasal steroid.     Skin testing with nonreactive histamine.  Cannot interpret negative results.  Oak was positive.  Blood testing positive for cat, dog, mold, trees, grass, weeds and dust mite.     - Singulair 10mg by mouth daily at night.   -Allegra-D daily  - Ketotifen 1 drop/eye twice daily as needed for allergy symptoms or Patanol (olapatdine) 1 drop/eye twice daily as needed.   - The patient has a history of allergic rhinoconjunctivitis and has exhausted all medical therapies without success in controlling symptoms. Immunotherapy was discussed as a treatment option with patient and family. Risks/benefits of immunotherapy were discussed and the patient/family wishes to proceed with allergen immunotherapy.   - The patient will be prescribed a injectable epinephrine device and will need to bring this with them to allergy shot appointments and carry with them for the rest of the day after they receive the allergy shot. Discussed signs and symptoms of a systemic reaction and when to use injectable epinephrine.   - Oral antihistamine daily prior to receiving allergy shot.            Relevant Medications    montelukast (SINGULAIR) 10 MG tablet    fexofenadine-pseudoePHEDrine (ALLEGRA-D 24) 180-240 MG 24 hr tablet       Other    Tree nut allergy     Diffuse itching, hives, vomiting and diarrhea after consuming tree nuts and itchy hands after consuming peanut.     Peanut 3 mm/3 mm  All tree nuts were negative, but nonreactive histamine at prior visit.  Blood testing positive for peanut and tree nuts.        -Continue to avoid peanut and tree nuts.  -Serum IgE for peanuts and tree nuts.  -Peanut component testing.  -An anaphylaxis action  plan was given and reviewed with patient and family.   Injectable epinephrine use was reviewed and demonstrated. The patient will need to carry injectable epinephrine.   Injectable epinephrine script provided.          Relevant Medications    montelukast (SINGULAIR) 10 MG tablet    EPINEPHrine (EPIPEN 2-ROMARIO) 0.3 MG/0.3ML injection 2-pack    Other Relevant Orders    Allergen almonds IgE (Completed)    Allergen peanut IgE (Completed)    Peanut Component Allergy Panel (Completed)    Allergen pistachio nut IgE (Completed)    Allergen walnuts IgE (Completed)    Allergen cashew IgE (Completed)    Allergen pecan nut IgE (Completed)    Shellfish allergy     History of itchy skin and nausea after consumption of crab.  Tolerates shrimp.  Unclear about lobster.     Crab 3 mm/3 mm.  Lobster nonreactive, but negative histamine at prior visit. Blood testing positive for crab and lobster.      -Continue to avoid shellfish.  -Serum IgE for shellfish.  -An anaphylaxis action plan was given and reviewed with patient and family.   Injectable epinephrine use was reviewed and demonstrated. The patient will need to carry injectable epinephrine.   Injectable epinephrine script provided.          Relevant Medications    montelukast (SINGULAIR) 10 MG tablet    EPINEPHrine (EPIPEN 2-ROMARIO) 0.3 MG/0.3ML injection 2-pack    Other Relevant Orders    Allergen crab IgE (Completed)    Allergen lobster IgE (Completed)    Allergen shrimp IgE (Completed)      Other Visit Diagnoses     Allergic rhinitis due to animal dander        Relevant Medications    montelukast (SINGULAIR) 10 MG tablet    Other Relevant Orders    Allergen dog epithelium IgE (Completed)    Allergic rhinitis due to dust mite        Relevant Medications    montelukast (SINGULAIR) 10 MG tablet    Other Relevant Orders    Allergen dog epithelium IgE (Completed)    Allergic rhinitis due to mold        Relevant Medications    montelukast (SINGULAIR) 10 MG tablet    Other Relevant Orders     Allergen dog epithelium IgE (Completed)          Chart documentation with Dragon Voice recognition Software. Although reviewed after completion, some words and grammatical errors may remain.    Jesús Lowry DO FAAAAI  Allergy/Immunology  [facility] [place] and [place]

## 2020-03-03 NOTE — LETTER
3/3/2020         RE: Sumaya Gatica  [id number]  [place] [address]        Dear Colleague,    Thank you for referring your patient, Sumaya Gatica, to the [facility]. Please see a copy of my visit note below.    Sumaya Gatica is a 49 year old White female with previous medical history significant for environmental allergies, tree nut allergy, shellfish allergy who returns for a follow up visit.     Patient returns for follow-up.  She was last seen in 2018.  Allergy testing positive for cat, dog, mold, trees, grass, weeds and dust mites.  Continues to have frequent allergy symptoms including congestion, postnasal drainage and ocular symptoms despite being on Alaway twice daily, Claritin.  Had tried nasal steroids in the past and had gagging and choking.  She has never been on montelukast.  No use of allergen immunotherapy.  History of peanut, tree nut and shellfish allergy.  No accidental exposures.  Carries injectable epinephrine.  Blood testing last done in 2018 which was positive.      Past Medical History:   Diagnosis Date     Abnormal Pap smear, can't excl hi gd sq intraepithelial lesion (ASC-H) 3/22/07    negative colposcopy     History of colposcopy with cervical biopsy 07     Ovarian cysts      Rosacea      Family History   Problem Relation Age of Onset     Alcohol/Drug Paternal Grandfather         alcohlism     Other Cancer Paternal Grandfather      Cancer Maternal Grandfather         gallbladder     Diabetes Maternal Grandmother              Cerebrovascular Disease Paternal Grandmother      Alzheimer Disease Paternal Grandmother         and dementia     Prostate Cancer Brother      Diabetes Mother      Alzheimer Disease Mother      Cancer Brother         skin     Past Surgical History:   Procedure Laterality Date     COLONOSCOPY  2012    Procedure: COLONOSCOPY;  Colonscopy Screening, Diverticulitis;  Surgeon: Reilly Holt MD;  Location:   GI     DILATION AND CURETTAGE, HYSTEROSCOPY, ABLATE ENDOMETRIUM NOVASURE, COMBINED  12/30/2011    Procedure:COMBINED DILATION AND CURETTAGE, HYSTEROSCOPY, ABLATE ENDOMETRIUM NOVASURE; hysteroscopy, dialtion and curettage, novasure endometrial ablation  ; Surgeon:MILAD VILLARREAL; Location: OR     GYN SURGERY  2015    [facility]      HC REMOVAL OF TONSILS,<11 Y/O       HC REPAIR OF NASAL SEPTUM  12/30/08    Septoplasty, submucosal resection inferior turbinates.     HYSTERECTOMY, PAP NO LONGER INDICATED         REVIEW OF SYSTEMS:  General: negative for weight gain. negative for weight loss. negative for changes in sleep.   Ears: negative for fullness. negative for hearing loss. negative for dizziness.   Nose: positive  for snoring.positive  for changes in smell. positive  for drainage.   Eyes: negative for eye watering. positive  for eye itching. negative for vision changes. negative for eye redness.  Throat: negative for hoarseness. negative for sore throat. negative for trouble swallowing.   Lungs: negative for shortness of breath.negative for wheezing. negative for sputum production.   Cardiovascular: negative for chest pain. negative for swelling of ankles. negative for fast or irregular heartbeat.   Gastrointestinal: negative for nausea. negative for heartburn. negative for acid reflux.   Musculoskeletal: negative for joint pain. negative for joint stiffness. negative for joint swelling.   Neurologic: negative for seizures. negative for fainting. negative for weakness.   Psychiatric: negative for changes in mood. negative for anxiety.   Endocrine: negative for cold intolerance. negative for heat intolerance. negative for tremors.   Lymphatic: negative for lower extremity swelling. negative for lymph node swelling.   Hematologic: negative for easy bruising. negative for easy bleeding.  Integumentary: negative for rash. negative for scaling. negative for nail changes.       Current Outpatient Medications:       calcium carbonate (OS-EDIS 500 MG [place]. [place]) 1250 MG tablet, Take 1 tablet by mouth daily, Disp: , Rfl:      EPINEPHrine (EPIPEN 2-ROMARIO) 0.3 MG/0.3ML injection 2-pack, Inject 0.3 mLs (0.3 mg) into the muscle as needed for anaphylaxis, Disp: 0.6 mL, Rfl: 1     fexofenadine-pseudoePHEDrine (ALLEGRA-D 24) 180-240 MG 24 hr tablet, Take 1 tablet by mouth daily, Disp: 30 tablet, Rfl: 11     ibuprofen (ADVIL,MOTRIN) 200 MG tablet, Take 400 mg by mouth every 4 hours as needed Reported on 2/27/2017, Disp: , Rfl:      loratadine (CLARITIN) 10 MG tablet, Take 10 mg by mouth daily, Disp: , Rfl:      montelukast (SINGULAIR) 10 MG tablet, Take 1 tablet (10 mg) by mouth At Bedtime, Disp: 30 tablet, Rfl: 11     SUMAtriptan (IMITREX) 50 MG tablet, Take 1 tablet (50 mg) by mouth See Admin Instructions WITH ONSET OF MIGRAINE, MAY REPEAT ONCE AFTER 2 HOURS. DO NOT EXCEED 4 TABLETS IN 24 HOURS., Disp: 12 tablet, Rfl: 1     UNABLE TO FIND, Apply topically daily MEDICATION NAME: testosterone cream, Disp: , Rfl:      UNABLE TO FIND, Place 1 drop into both eyes daily MEDICATION NAME: Alaway 0.035%, Disp: , Rfl:      VITAMIN D, CHOLECALCIFEROL, PO, Take 10,000 Units by mouth daily, Disp: , Rfl:   Immunization History   Administered Date(s) Administered     TD (ADULT, 7+) 11/02/2000     TDAP Vaccine (Adacel) 07/23/2010     Allergies   Allergen Reactions     No Known Drug Allergies      Peanuts [Nuts] Hives     Seasonal Allergies      Shellfish-Derived Products Itching         EXAM:   Constitutional:  Appears well-developed and well-nourished. No distress.   HEENT:   Head: Normocephalic.   No cobblestoning of posterior oropharynx.   Nasal tissue pink and normal appearing.  No rhinorrhea noted.    Eyes: Conjunctivae are non-erythematous   Cardiovascular: Normal rate, regular rhythm and normal heart sounds. Exam reveals no gallop and no friction rub.   No murmur heard.  Respiratory: Effort normal and breath sounds normal. No respiratory distress.  No wheezes. No rales.   Musculoskeletal: Normal range of motion.   Neuro: Oriented to person, place, and time.  Skin: Skin is warm and dry. No rash noted.   Psychiatric: Normal mood and affect.     Nursing note and vitals reviewed.    ASSESSMENT/PLAN:  Problem List Items Addressed This Visit        Respiratory    Chronic seasonal allergic rhinitis due to pollen - Primary     Perennial with spring and fall worsening nasal and ocular symptoms.  Current treatment is loratadine and Alaway.  Did not tolerate nasal steroid.     Skin testing with nonreactive histamine.  Cannot interpret negative results.  Oak was positive.  Blood testing positive for cat, dog, mold, trees, grass, weeds and dust mite.     - Singulair 10mg by mouth daily at night.   -Allegra-D daily  - Ketotifen 1 drop/eye twice daily as needed for allergy symptoms or Patanol (olapatdine) 1 drop/eye twice daily as needed.   - The patient has a history of allergic rhinoconjunctivitis and has exhausted all medical therapies without success in controlling symptoms. Immunotherapy was discussed as a treatment option with patient and family. Risks/benefits of immunotherapy were discussed and the patient/family wishes to proceed with allergen immunotherapy.   - The patient will be prescribed a injectable epinephrine device and will need to bring this with them to allergy shot appointments and carry with them for the rest of the day after they receive the allergy shot. Discussed signs and symptoms of a systemic reaction and when to use injectable epinephrine.   - Oral antihistamine daily prior to receiving allergy shot.            Relevant Medications    montelukast (SINGULAIR) 10 MG tablet    fexofenadine-pseudoePHEDrine (ALLEGRA-D 24) 180-240 MG 24 hr tablet       Other    Tree nut allergy     Diffuse itching, hives, vomiting and diarrhea after consuming tree nuts and itchy hands after consuming peanut.     Peanut 3 mm/3 mm  All tree nuts were negative, but  nonreactive histamine at prior visit.  Blood testing positive for peanut and tree nuts.        -Continue to avoid peanut and tree nuts.  -Serum IgE for peanuts and tree nuts.  -Peanut component testing.  -An anaphylaxis action plan was given and reviewed with patient and family.   Injectable epinephrine use was reviewed and demonstrated. The patient will need to carry injectable epinephrine.   Injectable epinephrine script provided.          Relevant Medications    montelukast (SINGULAIR) 10 MG tablet    EPINEPHrine (EPIPEN 2-ROMARIO) 0.3 MG/0.3ML injection 2-pack    Other Relevant Orders    Allergen almonds IgE (Completed)    Allergen peanut IgE (Completed)    Peanut Component Allergy Panel (Completed)    Allergen pistachio nut IgE (Completed)    Allergen walnuts IgE (Completed)    Allergen cashew IgE (Completed)    Allergen pecan nut IgE (Completed)    Shellfish allergy     History of itchy skin and nausea after consumption of crab.  Tolerates shrimp.  Unclear about lobster.     Crab 3 mm/3 mm.  Lobster nonreactive, but negative histamine at prior visit. Blood testing positive for crab and lobster.      -Continue to avoid shellfish.  -Serum IgE for shellfish.  -An anaphylaxis action plan was given and reviewed with patient and family.   Injectable epinephrine use was reviewed and demonstrated. The patient will need to carry injectable epinephrine.   Injectable epinephrine script provided.          Relevant Medications    montelukast (SINGULAIR) 10 MG tablet    EPINEPHrine (EPIPEN 2-ROMARIO) 0.3 MG/0.3ML injection 2-pack    Other Relevant Orders    Allergen crab IgE (Completed)    Allergen lobster IgE (Completed)    Allergen shrimp IgE (Completed)      Other Visit Diagnoses     Allergic rhinitis due to animal dander        Relevant Medications    montelukast (SINGULAIR) 10 MG tablet    Other Relevant Orders    Allergen dog epithelium IgE (Completed)    Allergic rhinitis due to dust mite        Relevant Medications     montelukast (SINGULAIR) 10 MG tablet    Other Relevant Orders    Allergen dog epithelium IgE (Completed)    Allergic rhinitis due to mold        Relevant Medications    montelukast (SINGULAIR) 10 MG tablet    Other Relevant Orders    Allergen dog epithelium IgE (Completed)          Chart documentation with Dragon Voice recognition Software. Although reviewed after completion, some words and grammatical errors may remain.    Jesús Lowry DO FAAAAI  Allergy/Immunology  [facility] and [place], [place]      Again, thank you for allowing me to participate in the care of your patient.        Sincerely,        Jesús Lowry DO

## 2020-03-03 NOTE — LETTER
[facility]                   FOOD ALLERGY & ANAPHYLAXIS EMERGENCY CARE PLAN  Food Allergy Research & Education         Name: Sumaya Gatica D.O.B.:  [id number]    Allergy to: Tree nuts, peanuts, and shellfish  Weight: 182 lbs 0 oz lbs.  Asthma:  No    -NOTE: Do not depend on antihistamines or inhalers (bronchodilators) to treat a severe reaction. USE EPINEPHRINE.     MEDICATIONS/DOSES  Epinephrine Brand: EpiPen  Epinephrine Dose: 0.3 mg IM  Antihistamine Brand or Generic: Zyrtec (Cetirizine)  Antihistamine Dose: 10mg         FARE                   FOOD ALLERGY & ANAPHYLAXIS EMERGENCY CARE PLAN   Food Allergy Research & Education           EMERGENCY CONTACTS - CALL 911  DOCTOR:  Jesús Lowry DO   PHONE: [phone]  PARENT/GUARDIAN:              PHONE:  OTHER EMERGENCY CONTACTS  NAME/RELATIONSHIP:   PHONE:   NAME/RELATIONSHIP:    PHONE:           PARENT/GUARDIAN AUTHORIZATION SIGNATURE     DATE              PHYSICIAN/H CP AUTHORIZATION SIGNATURE         DATE  FORM PROVIDED COURTESY OF FOOD ALLERGY RESEARCH & EDUCATION (FARE) ([email or web address]) 2014

## 2020-03-03 NOTE — PATIENT INSTRUCTIONS
Allergy Staff Appt Hours Shot Hours Locations    Physician     Jesús Lowry DO       Support Staff     SKYLAR Hernadez, [facility]  Tuesday:        [place] 7-4:20     Wednesday:        [place]: 7-5     Thursday:                    [place] 7-6:40     Friday:  [place]  7-2:40   [place]        Thursday: 1-5:50        Friday: 7-10:50     [place]        Tuesday: 7- 3:20        Wednesday: 7-4:20     [place] Monday: 7-4:20        Tuesday: 1-6:20         [facility]  [address] [place] [address]  Appt Line: [phone]  Allergy RN:  [phone]    [facility]  [address] [place] [address]  Appt Line: [phone]  Allergy RN:  [phone]       Important Scheduling Information  Aspirin Desensitization: Appt will last 2 clinic days. Please call the Allergy RN line for your clinic to schedule. Discontinue antihistamines 7 days prior to the appointment.     Food Challenges: Appt will last 3-4 hours. Please call the Allergy RN line for your clinic to schedule. Discontinue antihistamines 7 days prior to the appointment.     Penicillin Testing: Appt will last 2-3 hours. Please call the Allergy RN line for your clinic to schedule. Discontinue antihistamines 7 days prior to the appointment.     Skin Testing: Appt will about 40 minutes. Call the appointment line for your clinic to schedule. Discontinue antihistamines 7 days prior to the appointment.     Venom Testing: Appt will last 2-3 hours. Please call the Allergy RN line for your clinic to schedule. Discontinue antihistamines 7 days prior to the appointment.     Thank you for trusting us with your Allergy, Asthma, and Immunology care. Please feel free to contact us with any questions or concerns you may have.      - Patanol (olapatdine) 1 drop/eye twice daily as needed.   - Ketotifen (Zaditor, Alaway) 1 drop/eye twice daily as needed.   - Stop Claritin.   - Allegra-D daily.   - Singulair 10mg by mouth daily at night.   -  Continue to avoid peanut, tree nuts and shellfish.     Anaphylaxis Action Plan for Immunotherapy Patients    There is risk of systemic reactions when receiving immunotherapy injections. Local reactions such as a wheal (hive) smaller than a quarter, redness, swelling, and soreness are common. If the wheal is greater than the size of a half dollar (3.4 cm) please contact our clinic (numbers below), as we will need to adjust the dose that you receive at your next injection. Waiting until the next appointment to inform us of the reaction could increase the wait time that you experience, because your allergist will need to be contacted for new orders prior to giving the injection.  If you have symptoms not localized to the injection site, please follow the anaphylaxis plan, and contact our office to update after you have received emergency medical treatment. Please ask to speak to an Allergy RN with any questions or updates.     [facility]: [phone]  [facility]: [phone]  [facility]: [phone]  [place]: [phone]  [phone]      ACCELERATED IMMUNOTHERAPY PATIENT INFORMATION    Immunotherapy is a treatment that alters the patient's immune system so they have less allergy symptoms, use less medications to control symptoms, have improved quality of life, and less health care utilization    Accelerated immunotherapy schedules are designed to allow patients to reach their maintenance immunotherapy dose in a shorter time frame than conventional immunotherapy.    Clinical benefit can be reached more rapidly using accelerated immunotherapy.     Many patients do not want to start allergen immunotherapy secondary to the upfront time commitment associated with conventional allergy shots. Cluster immunotherapy would allow the patient to be on monthly injections in a much shorter period of time. The maintenance dose is typically reached within 8 to 9 weeks.     Cluster immunotherapy has a  similar risk of systemic reaction to conventional immunotherapy. The patient will need to take oral antihistamines to pre-medicate prior to injection appointments while going through this treatment.    CLUSTER IMMUNOTHERAPY PATIENT INSTRUCTIONS    Asthma medications must be continued and asthma must be well controlled prior to receiving CLUSTER immunotherapy. Immunotherapy should not be given if you are feeling ill.    Other allergy medications may be continued    You should plan to spend approximately 2 hours at the clinic. Please feel free to bring things to occupy your time such as books, work, or computers. You may also wish to bring something to eat as you will not be able to leave the clinic once the procedure has begun.    You will be required to bring an epinephrine auto-injector with you to your CLUSTER immunotherapy appointments and all subsequent allergy shot appointments    You will be required to take the following pre-medication regimen prior  to your CLUSTER immunotherapy appointments  o Medications to be taken 1 day prior to CLUSTER:  - Zyrtec (cetirizine) 20mg or Allegra (fexofenadine) 360mg twice daily  o Medications to be taken the morning of CLUSTER:  - Zyrtec (cetirizine) 20mg or Allegra (fexofenadine) 360mg

## 2020-03-04 ENCOUNTER — TELEPHONE (OUTPATIENT)
Dept: FAMILY MEDICINE | Facility: OTHER | Age: 50
End: 2020-03-04

## 2020-03-04 DIAGNOSIS — J30.81 ALLERGIC RHINITIS DUE TO ANIMAL DANDER: ICD-10-CM

## 2020-03-04 DIAGNOSIS — J30.89 ALLERGIC RHINITIS DUE TO MOLD: ICD-10-CM

## 2020-03-04 DIAGNOSIS — J30.89 ALLERGIC RHINITIS DUE TO DUST MITE: ICD-10-CM

## 2020-03-04 DIAGNOSIS — J30.1 CHRONIC SEASONAL ALLERGIC RHINITIS DUE TO POLLEN: Primary | ICD-10-CM

## 2020-03-04 RX ORDER — OLOPATADINE HYDROCHLORIDE 1 MG/ML
1 SOLUTION/ DROPS OPHTHALMIC 2 TIMES DAILY
Qty: 1 BOTTLE | Refills: 4 | Status: SHIPPED | OUTPATIENT
Start: 2020-03-04 | End: 2020-08-11

## 2020-03-04 NOTE — TELEPHONE ENCOUNTER
Patient notified.  She does have paper rx for Allegra-D.  No further action needed, closing encounter.    Pallavi Carrillo RN

## 2020-03-04 NOTE — TELEPHONE ENCOUNTER
Pt is calling because she went to the [place] pharmacy to  her medications and the allegra was not sent over. Please advise.     When eye drops were discussed  stated that the patanol eye drops was over the counter and they weren't. Does he still want her to be on those or the ones that she was. Please advise.

## 2020-03-05 ENCOUNTER — MYC MEDICAL ADVICE (OUTPATIENT)
Dept: ALLERGY | Facility: OTHER | Age: 50
End: 2020-03-05

## 2020-03-05 DIAGNOSIS — J01.90 ACUTE SINUSITIS WITH SYMPTOMS > 10 DAYS: Primary | ICD-10-CM

## 2020-03-05 LAB
ALMOND IGE QN: 1.53 KU(A)/L
CASHEW NUT IGE QN: 0.15 KU(A)/L
CRAB IGE QN: 0.68 KU(A)/L
DOG DANDER+EPITH IGE QN: 0.25 KU(A)/L
LOBSTER IGE QN: 0.57 KU(A)/L
PEANUT (RARA H) 1 IGE QN: <0.1 KU(A)/L
PEANUT (RARA H) 2 IGE QN: <0.1 KU(A)/L
PEANUT (RARA H) 3 IGE QN: <0.1 KU(A)/L
PEANUT (RARA H) 8 IGE QN: <0.1 KU(A)/L
PEANUT (RARA H) 9 IGE QN: <0.1 KU(A)/L
PEANUT IGE QN: 1.44 KU(A)/L
PECAN/HICK NUT IGE QN: 0.54 KU(A)/L
PISTACHIO IGE QN: 1.38 KU(A)/L
SHRIMP IGE QN: 0.6 KU(A)/L
WALNUT IGE QN: 1.38 KU(A)/L

## 2020-03-05 NOTE — TELEPHONE ENCOUNTER
Discussed with patient.  She does not want to do food challenges at this time, prefers to avoid the foods.  No further questions, closing encounter.    Pallavi Carrillo RN

## 2020-03-05 NOTE — RESULT ENCOUNTER NOTE
Dog testing remains positive. All levels for allergens have decreased. This includes tree nuts, and peanut. This also includes shellfish. None of the levels are zero. If you wanted to trial some of these foods we could do such as an oral food challenge in clinic. This would not be without risk and that could include anaphylaxis but as levels go down your chance of outgrowing allergy increasing. I can have nurse call and discuss if you are interested. Otherwise continue to avoid. Thanks.     Dr. Lowry

## 2020-03-08 ENCOUNTER — E-VISIT (OUTPATIENT)
Dept: FAMILY MEDICINE | Facility: OTHER | Age: 50
End: 2020-03-08
Payer: COMMERCIAL

## 2020-03-08 DIAGNOSIS — R09.81 NASAL CONGESTION: Primary | ICD-10-CM

## 2020-03-08 PROCEDURE — 99421 OL DIG E/M SVC 5-10 MIN: CPT | Performed by: PHYSICIAN ASSISTANT

## 2020-03-09 RX ORDER — AZITHROMYCIN 250 MG/1
TABLET, FILM COATED ORAL
Qty: 6 TABLET | Refills: 0 | Status: CANCELLED | OUTPATIENT
Start: 2020-03-09 | End: 2020-03-14

## 2020-03-09 NOTE — TELEPHONE ENCOUNTER
Provider E-Visit time total (minutes): 5 minutes initially chart review and response to pt, additional 2 min total 7  Tiffanie Montes PA-C

## 2020-03-18 NOTE — TELEPHONE ENCOUNTER
Notified patient via Cardiolat that we will not be starting her on cluster immunotherapy due to the COVID19 pandemic.      Pallavi Carrillo RN

## 2020-03-19 NOTE — TELEPHONE ENCOUNTER
"Patient has had what she describes as a \"sinus headache\" since about beginning of March.  States she is taking her meds as prescribed (Singulair, Allegra D, eyedrops).  States it is causing migraines and sensitivity to noise.  Has very slight facial/ teeth pressure, nasal drainage with cough, no fever.  Did virtual visit with PCP on 3/8/20 and was told to try Afrin temporarily, which she says was helpful but headaches came back when stopped.  Wondering if there is anything else she can try for her allergy symptoms.  Per LOV note she didn't tolerate nasal steroid.    I did recommend she talk with PCP about migraines.  Please advise if any additional recommendations for patient, thanks.    Pallavi Carrillo RN    "

## 2020-03-24 DIAGNOSIS — J30.2 SEASONAL ALLERGIC RHINITIS: Primary | ICD-10-CM

## 2020-03-24 PROCEDURE — 95165 ANTIGEN THERAPY SERVICES: CPT | Mod: 59 | Performed by: ALLERGY & IMMUNOLOGY

## 2020-03-24 PROCEDURE — 95165 ANTIGEN THERAPY SERVICES: CPT | Performed by: ALLERGY & IMMUNOLOGY

## 2020-03-24 NOTE — PROGRESS NOTES
Allergy serums received at [facility].     Vials received below:    Vial Color Content                      Vial Size Expiration Date  Green 1:1,000 Trees 5mL  9/10/2020  Blue 1:100 Trees 5mL  3/10/2021  Yellow 1:10 Trees 5mL  3/10/2021  Red 1:1 Trees 5mL  3/10/2021    Green 1:1,000 Dog, Grass, Dust Mite 5mL  9/10/2020  Blue 1:100 Dog, Grass, Dust Mite 5mL  3/10/2021  Yellow 1:10 Dog, Grass, Dust Mite 5mL  3/10/2021  Red 1:1 Dog, Grass, Dust Mite 5mL  3/10/2021    Green 1:1,000 Cat, Molds 5mL  9/10/2020  Blue 1:100 Cat, Molds 5mL  3/10/2021  Yellow 1:10 Cat, Molds 5mL  3/10/2021  Red 1:1 Cat, Molds 5mL  3/10/2021    Green 1:1,000 Weeds 5mL  9/10/2020  Blue 1:100 Weeds 5mL  3/10/2021  Yellow 1:10 Weeds 5mL  3/10/2021  Red 1:1 Weeds 5mL  3/10/2021    Signature  Ara Garza MA, CMA ......3/24/2020...10:14 AM

## 2020-03-24 NOTE — PROGRESS NOTES
Allergy serums billed at [facility].     Vials billed below:    Vial Color Content                      Vial Size Expiration Date  Green 1:1,000 Trees 5mL  9/10/2020  Blue 1:100 Trees 5mL  3/10/2021  Yellow 1:10 Trees 5mL  3/10/2021  Red 1:1 Trees 5mL  3/10/2021    Green 1:1,000 Dog, Grass, Dust Mite 5mL  9/10/2020  Blue 1:100 Dog, Grass, Dust Mite 5mL  3/10/2021  Yellow 1:10 Dog, Grass, Dust Mite 5mL  3/10/2021  Red 1:1 Dog, Grass, Dust Mite 5mL  3/10/2021    Green 1:1,000 Cat, Molds 5mL  9/10/2020  Blue 1:100 Cat, Molds 5mL  3/10/2021  Yellow 1:10 Cat, Molds 5mL  3/10/2021  Red 1:1 Cat, Molds 5mL  3/10/2021    Green 1:1,000 Weeds 5mL  9/10/2020  Blue 1:100 Weeds 5mL  3/10/2021  Yellow 1:10 Weeds 5mL  3/10/2021  Red 1:1 Weeds 5mL  3/10/2021    Original Refill encounter date: 3/3/2020      Signature  Ara Garza MA, CMA ......3/24/2020...10:16 AM

## 2020-06-09 DIAGNOSIS — G43.009 MIGRAINE WITHOUT AURA AND WITHOUT STATUS MIGRAINOSUS, NOT INTRACTABLE: ICD-10-CM

## 2020-06-10 RX ORDER — SUMATRIPTAN 50 MG/1
50 TABLET, FILM COATED ORAL SEE ADMIN INSTRUCTIONS
Qty: 12 TABLET | Refills: 1 | Status: SHIPPED | OUTPATIENT
Start: 2020-06-10 | End: 2021-03-16

## 2020-06-10 NOTE — TELEPHONE ENCOUNTER
Prescription approved per [facility] Refill Protocol.  Garcia Laureano, RN, BSN

## 2020-07-07 ENCOUNTER — TELEPHONE (OUTPATIENT)
Dept: ALLERGY | Facility: OTHER | Age: 50
End: 2020-07-07

## 2020-07-13 ENCOUNTER — TELEPHONE (OUTPATIENT)
Dept: ALLERGY | Facility: CLINIC | Age: 50
End: 2020-07-13

## 2020-07-13 NOTE — TELEPHONE ENCOUNTER
Per patient she would like a call regarding allergy shots. She is wanting to wait to resume them until [facility] opens back up. Nighat Rajput TC/Pt Rep

## 2020-07-13 NOTE — TELEPHONE ENCOUNTER
Returned patient call.      Patient stated that she has not started her allergy shots yet.  She decided she would like to wait until [facility] opens to start allergy shots.  Advised patient that her green allergy serum will be expiring on 09-.  Patient advised that if her serum expires her other serum will need to be mixed down.  Patient verbalized good understanding.    Patient stated she will call back to schedule an appointment if she decides to go to [facility] or [facility] clinic instead.    Jen Floyd RN

## 2020-08-10 DIAGNOSIS — J30.89 ALLERGIC RHINITIS DUE TO DUST MITE: ICD-10-CM

## 2020-08-10 DIAGNOSIS — J30.81 ALLERGIC RHINITIS DUE TO ANIMAL DANDER: ICD-10-CM

## 2020-08-10 DIAGNOSIS — J30.89 ALLERGIC RHINITIS DUE TO MOLD: ICD-10-CM

## 2020-08-10 DIAGNOSIS — J30.1 CHRONIC SEASONAL ALLERGIC RHINITIS DUE TO POLLEN: ICD-10-CM

## 2020-08-11 RX ORDER — OLOPATADINE HYDROCHLORIDE 1 MG/ML
1 SOLUTION/ DROPS OPHTHALMIC 2 TIMES DAILY
Qty: 1 BOTTLE | Refills: 4 | Status: SHIPPED | OUTPATIENT
Start: 2020-08-11

## 2020-08-11 NOTE — TELEPHONE ENCOUNTER
[facility] [place] [facility]. Refilled.    Carolee Cook RN Specialty Triage 8/11/2020 10:44 AM

## 2020-09-08 ENCOUNTER — OFFICE VISIT (OUTPATIENT)
Dept: ALLERGY | Facility: OTHER | Age: 50
End: 2020-09-08
Payer: COMMERCIAL

## 2020-09-08 VITALS
SYSTOLIC BLOOD PRESSURE: 116 MMHG | TEMPERATURE: 97.9 F | HEIGHT: 67 IN | DIASTOLIC BLOOD PRESSURE: 70 MMHG | HEART RATE: 90 BPM | WEIGHT: 170 LBS | BODY MASS INDEX: 26.68 KG/M2 | OXYGEN SATURATION: 100 %

## 2020-09-08 DIAGNOSIS — J30.81 ALLERGIC RHINITIS DUE TO ANIMAL DANDER: ICD-10-CM

## 2020-09-08 DIAGNOSIS — J30.1 CHRONIC SEASONAL ALLERGIC RHINITIS DUE TO POLLEN: Primary | ICD-10-CM

## 2020-09-08 DIAGNOSIS — J30.89 ALLERGIC RHINITIS DUE TO MOLD: ICD-10-CM

## 2020-09-08 DIAGNOSIS — J30.89 ALLERGIC RHINITIS DUE TO DUST MITE: ICD-10-CM

## 2020-09-08 PROCEDURE — 95180 RAPID DESENSITIZATION: CPT | Performed by: ALLERGY & IMMUNOLOGY

## 2020-09-08 PROCEDURE — 99207 ZZC DROP WITH A PROCEDURE: CPT | Performed by: ALLERGY & IMMUNOLOGY

## 2020-09-08 RX ORDER — TRIAMCINOLONE ACETONIDE 55 UG/1
2 SPRAY, METERED NASAL DAILY
Qty: 1 BOTTLE | Refills: 11 | Status: SHIPPED | OUTPATIENT
Start: 2020-09-08

## 2020-09-08 ASSESSMENT — MIFFLIN-ST. JEOR: SCORE: 1423.74

## 2020-09-08 NOTE — ASSESSMENT & PLAN NOTE
Perennial with spring and fall worsening nasal and ocular symptoms.  Current treatment is loratadine and Alaway.  Did not tolerate nasal steroid.     Skin testing with nonreactive histamine.  Cannot interpret negative results.  Oak was positive.  Blood testing positive for cat, dog, mold, trees, grass, weeds and dust mite.    Cluster immunotherapy   The patient received green 0.1, green 0.4 and blue 0.1ml today. Each dose was  by 30 minutes. Full report will be scanned into electronic medical record. The pateint was in office for over 1.5 hours.     -Singulair 10mg by mouth daily at night.   -Allegra-D daily  -Ketotifen 1 drop/eye twice daily as needed for allergy symptoms or Patanol (olapatdine) 1 drop/eye twice daily as needed.

## 2020-09-08 NOTE — PATIENT INSTRUCTIONS
Allergy Staff Appt Hours Shot Hours Locations    Physician     Jesús Lowry DO       Support Staff     SKYLAR Hernadez, [facility]  Tuesday:        [place] 7-4:20     Wednesday:        [place]: 7-5     Thursday:                    [place] 7-6:40     Friday:  [place]  7-2:40   [place]        Thursday: 1-5:50        Friday: 7-10:50     [place]        Tuesday: 7- 3:20        Wednesday: 7-4:20     [place] Monday: 7-4:20        Tuesday: 1-6:20         [facility]  [address] [place] [address]  Appt Line: [phone]  Allergy RN:  [phone]    [facility]  [address] [place] [address]  Appt Line: [phone]  Allergy RN:  [phone]       Important Scheduling Information  Aspirin Desensitization: Appt will last 2 clinic days. Please call the Allergy RN line for your clinic to schedule. Discontinue antihistamines 7 days prior to the appointment.     Food Challenges: Appt will last 3-4 hours. Please call the Allergy RN line for your clinic to schedule. Discontinue antihistamines 7 days prior to the appointment.     Penicillin Testing: Appt will last 2-3 hours. Please call the Allergy RN line for your clinic to schedule. Discontinue antihistamines 7 days prior to the appointment.     Skin Testing: Appt will about 40 minutes. Call the appointment line for your clinic to schedule. Discontinue antihistamines 7 days prior to the appointment.     Venom Testing: Appt will last 2-3 hours. Please call the Allergy RN line for your clinic to schedule. Discontinue antihistamines 7 days prior to the appointment.     Thank you for trusting us with your Allergy, Asthma, and Immunology care. Please feel free to contact us with any questions or concerns you may have.

## 2020-09-08 NOTE — PROGRESS NOTES
Sumaya Gatica is a 50 year old White female with previous medical history significant for allergic rhinitis who returns for a follow up visit.     Patient presents today for cluster immunotherapy. The patient is currently in a good state of health. No recent fevers, chills, cough, wheezing, shortness of breath, skin rash, angioedema, nausea, vomiting or diarrhea. The risks and benefits were discussed and the patient/patient's family wishes to proceed. The consent was signed.    Past Medical History:   Diagnosis Date     Abnormal Pap smear, can't excl hi gd sq intraepithelial lesion (ASC-H) 3/22/07    negative colposcopy     History of colposcopy with cervical biopsy 07     Ovarian cysts      Rosacea      Family History   Problem Relation Age of Onset     Alcohol/Drug Paternal Grandfather         alcohlism     Other Cancer Paternal Grandfather      Cancer Maternal Grandfather         gallbladder     Diabetes Maternal Grandmother              Cerebrovascular Disease Paternal Grandmother      Alzheimer Disease Paternal Grandmother         and dementia     Prostate Cancer Brother      Diabetes Mother      Alzheimer Disease Mother      Cancer Brother         skin     Past Surgical History:   Procedure Laterality Date     COLONOSCOPY  2012    Procedure: COLONOSCOPY;  Colonscopy Screening, Diverticulitis;  Surgeon: Reilly Holt MD;  Location:  GI     DILATION AND CURETTAGE, HYSTEROSCOPY, ABLATE ENDOMETRIUM NOVASURE, COMBINED  2011    Procedure:COMBINED DILATION AND CURETTAGE, HYSTEROSCOPY, ABLATE ENDOMETRIUM NOVASURE; hysteroscopy, dialtion and curettage, novasure endometrial ablation  ; Surgeon:MILAD VILLARREAL; Location: OR     GYN SURGERY      [facility] REMOVAL OF TONSILS,<11 Y/O       HC REPAIR OF NASAL SEPTUM  08    Septoplasty, submucosal resection inferior turbinates.     HYSTERECTOMY, PAP NO LONGER INDICATED         REVIEW OF SYSTEMS:  General: negative for weight  gain. negative for weight loss. negative for changes in sleep.   Ears: negative for fullness. negative for hearing loss. negative for dizziness.   Nose: negative for snoring.negative for changes in smell. negative for drainage.   Eyes: negative for eye watering. negative for eye itching. negative for vision changes. negative for eye redness.  Throat: negative for hoarseness. negative for sore throat. negative for trouble swallowing.   Lungs: negative for shortness of breath.negative for wheezing. negative for sputum production.   Cardiovascular: negative for chest pain. negative for swelling of ankles. negative for fast or irregular heartbeat.   Gastrointestinal: negative for nausea. negative for heartburn. negative for acid reflux.   Musculoskeletal: negative for joint pain. negative for joint stiffness. negative for joint swelling.   Neurologic: negative for seizures. negative for fainting. negative for weakness.   Psychiatric: negative for changes in mood. negative for anxiety.   Endocrine: negative for cold intolerance. negative for heat intolerance. negative for tremors.   Lymphatic: negative for lower extremity swelling. negative for lymph node swelling.   Hematologic: negative for easy bruising. negative for easy bleeding.  Integumentary: negative for rash. negative for scaling. negative for nail changes.       Current Outpatient Medications:      calcium carbonate (OS-EDIS 500 MG [place]) 1250 MG tablet, Take 1 tablet by mouth daily, Disp: , Rfl:      EPINEPHrine (EPIPEN 2-ROMARIO) 0.3 MG/0.3ML injection 2-pack, Inject 0.3 mLs (0.3 mg) into the muscle as needed for anaphylaxis, Disp: 0.6 mL, Rfl: 1     fexofenadine-pseudoePHEDrine (ALLEGRA-D 24) 180-240 MG 24 hr tablet, Take 1 tablet by mouth daily, Disp: 30 tablet, Rfl: 11     ibuprofen (ADVIL,MOTRIN) 200 MG tablet, Take 400 mg by mouth every 4 hours as needed Reported on 2/27/2017, Disp: , Rfl:      loratadine (CLARITIN) 10 MG tablet, Take 10 mg by mouth  daily, Disp: , Rfl:      montelukast (SINGULAIR) 10 MG tablet, Take 1 tablet (10 mg) by mouth At Bedtime, Disp: 30 tablet, Rfl: 11     olopatadine (PATANOL) 0.1 % ophthalmic solution, Place 1 drop into both eyes 2 times daily, Disp: 1 Bottle, Rfl: 4     ORDER FOR ALLERGEN IMMUNOTHERAPY, Cat Hair, Standardized A.P. 10,000 BAU/mL, HS  2.0 ml  Alternaria Tenuis 1:10 w/v, HS  0.5 ml Aspergillus Fumigatus 1:10 w/v, HS  0.5 ml Epicoccum Nigrum 1:10 w/v, HS 0.5 ml Penicillium Notatum 1:10 w/v, HS  0.5 ml Diluent: HSA qs to 5ml, Disp: 5 mL, Rfl: prn     ORDER FOR ALLERGEN IMMUNOTHERAPY, Abdiaziz, White 1:20 w/v, HS  0.5 ml Birch Mix PRW 1:20 w/v, HS  0.5 ml Boxelder-Maple Mix BHR (Boxelder Hard Red) 1:20 w/v, HS  0.5 ml Cedar, Red 1:20 w/v, HS  0.5 ml Donley, Common 1:20 w/v, HS  0.5 ml Elm, American 1:20 w/v, HS  0.5 ml [place] Mix RW 1:20 w/v, HS 0.5 ml Oak Mix RVW 1:20 w/v, HS 0.5 ml Pine, White 1:20 w/v, ALK 0.5 ml Mckinney Tree, Black 1:20 w/v, HS 0.5 ml Diluent: HSA qs to 5ml, Disp: 5 mL, Rfl: prn     ORDER FOR ALLERGEN IMMUNOTHERAPY, Dog Hair-Dander, A. P.  1:100 w/v, HS  1.0 ml Dust Mites DF. 10,000 AU/mL, HS  0.5 ml Dust Mites DP. 10,000 AU/mL, HS  0.5 ml  Richard Grass 1:20 w/v, HS 0.5 ml Bonilla Grass (Std) 100,000 BAU/mL, HS 0.4 ml Diluent: HSA qs to 5ml, Disp: 5 mL, Rfl: prn     ORDER FOR ALLERGEN IMMUNOTHERAPY, Kochia 1:20 w/v, HS 0.5 ml Lamb's Quarters 1:20 w/v, HS 0.5 ml Nettle 1:20 w/v, HS 0.5 ml Plantain, English 1:20 w/v, HS 0.5 ml Ragweed, Mix (Giant, Short) 1:20 w/v, HS 0.5 ml Russian Thistle 1:20 w/v, HS 0.5 ml Sagebrush, Mugwort 1:20 w/v, HS 0.5 ml Sorrel, Sheep 1:20 w/v, HS 0.5 ml Diluent: HSA qs to 5ml, Disp: 5 mL, Rfl: prn     SUMAtriptan (IMITREX) 50 MG tablet, Take 1 tablet (50 mg) by mouth See Admin Instructions For ONSET OF MIGRAINE, MAY REPEAT ONCE AFTER 2 HRS. DO NOT EXCEED 4 TABLETS IN 24 HRS., Disp: 12 tablet, Rfl: 1     triamcinolone (NASACORT) 55 MCG/ACT nasal aerosol, Spray 2 sprays into  both nostrils daily, Disp: 1 Bottle, Rfl: 11     UNABLE TO FIND, Apply topically daily MEDICATION NAME: testosterone cream, Disp: , Rfl:      UNABLE TO FIND, Place 1 drop into both eyes daily MEDICATION NAME: Alaway 0.035%, Disp: , Rfl:      VITAMIN D, CHOLECALCIFEROL, PO, Take 10,000 Units by mouth daily, Disp: , Rfl:   Immunization History   Administered Date(s) Administered     TD (ADULT, 7+) 11/02/2000     TDAP Vaccine (Adacel) 07/23/2010     Allergies   Allergen Reactions     No Known Drug Allergies      Peanuts [Nuts] Hives     Seasonal Allergies      Shellfish-Derived Products Itching     EXAM:   Constitutional:  Appears well-developed and well-nourished. No distress.   HEENT:   Head: Normocephalic.   No cobblestoning of posterior oropharynx.   Nasal tissue pink and normal appearing.  No rhinorrhea noted.    Eyes: Conjunctivae are non-erythematous   Cardiovascular: Normal rate, regular rhythm and normal heart sounds. Exam reveals no gallop and no friction rub.   No murmur heard.  Respiratory: Effort normal and breath sounds normal. No respiratory distress. No wheezes. No rales.   Musculoskeletal: Normal range of motion.   Neuro: Oriented to person, place, and time.  Skin: Skin is warm and dry. No rash noted.   Psychiatric: Normal mood and affect.     Nursing note and vitals reviewed.    ASSESSMENT/PLAN:  Problem List Items Addressed This Visit        Respiratory    Chronic seasonal allergic rhinitis due to pollen - Primary     Perennial with spring and fall worsening nasal and ocular symptoms.  Current treatment is loratadine and Alaway.  Did not tolerate nasal steroid.     Skin testing with nonreactive histamine.  Cannot interpret negative results.  Oak was positive.  Blood testing positive for cat, dog, mold, trees, grass, weeds and dust mite.    Cluster immunotherapy   The patient received green 0.1, green 0.4 and blue 0.1ml today. Each dose was  by 30 minutes. Full report will be scanned into  electronic medical record. The pateint was in office for over 1.5 hours.     -Singulair 10mg by mouth daily at night.   -Allegra-D daily  -Ketotifen 1 drop/eye twice daily as needed for allergy symptoms or Patanol (olapatdine) 1 drop/eye twice daily as needed.          Relevant Medications    triamcinolone (NASACORT) 55 MCG/ACT nasal aerosol    Other Relevant Orders    RAPID DESENSITIZATION    Allergic rhinitis due to mold    Relevant Medications    triamcinolone (NASACORT) 55 MCG/ACT nasal aerosol    Other Relevant Orders    RAPID DESENSITIZATION    Allergic rhinitis due to dust mite    Relevant Medications    triamcinolone (NASACORT) 55 MCG/ACT nasal aerosol    Other Relevant Orders    RAPID DESENSITIZATION (Completed)    Allergic rhinitis due to animal dander    Relevant Medications    triamcinolone (NASACORT) 55 MCG/ACT nasal aerosol    Other Relevant Orders    RAPID DESENSITIZATION (Completed)          Chart documentation with Dragon Voice recognition Software. Although reviewed after completion, some words and grammatical errors may remain.    Jesús Lowry DO FAAAAI  Medical Director for Allergy/Immunology at [place], [place]

## 2020-09-08 NOTE — LETTER
2020         RE: Sumaya Gatica  [id number] [address]  [place] [address]        Dear Colleague,    Thank you for referring your patient, Sumaya Gatica, to the [facility]. Please see a copy of my visit note below.    Sumaya Gatica is a 50 year old White female with previous medical history significant for allergic rhinitis who returns for a follow up visit.     Patient presents today for cluster immunotherapy. The patient is currently in a good state of health. No recent fevers, chills, cough, wheezing, shortness of breath, skin rash, angioedema, nausea, vomiting or diarrhea. The risks and benefits were discussed and the patient/patient's family wishes to proceed. The consent was signed.    Past Medical History:   Diagnosis Date     Abnormal Pap smear, can't excl hi gd sq intraepithelial lesion (ASC-H) 3/22/07    negative colposcopy     History of colposcopy with cervical biopsy 07     Ovarian cysts      Rosacea      Family History   Problem Relation Age of Onset     Alcohol/Drug Paternal Grandfather         alcohlism     Other Cancer Paternal Grandfather      Cancer Maternal Grandfather         gallbladder     Diabetes Maternal Grandmother              Cerebrovascular Disease Paternal Grandmother      Alzheimer Disease Paternal Grandmother         and dementia     Prostate Cancer Brother      Diabetes Mother      Alzheimer Disease Mother      Cancer Brother         skin     Past Surgical History:   Procedure Laterality Date     COLONOSCOPY  2012    Procedure: COLONOSCOPY;  Colonscopy Screening, Diverticulitis;  Surgeon: Reilly Holt MD;  Location:  GI     DILATION AND CURETTAGE, HYSTEROSCOPY, ABLATE ENDOMETRIUM NOVASURE, COMBINED  2011    Procedure:COMBINED DILATION AND CURETTAGE, HYSTEROSCOPY, ABLATE ENDOMETRIUM NOVASURE; hysteroscopy, dialtion and curettage, novasure endometrial ablation  ; Surgeon:MILAD VILLARREAL; Location: OR     GYN  SURGERY  2015    [facility]      HC REMOVAL OF TONSILS,<11 Y/O       HC REPAIR OF NASAL SEPTUM  12/30/08    Septoplasty, submucosal resection inferior turbinates.     HYSTERECTOMY, PAP NO LONGER INDICATED         REVIEW OF SYSTEMS:  General: negative for weight gain. negative for weight loss. negative for changes in sleep.   Ears: negative for fullness. negative for hearing loss. negative for dizziness.   Nose: negative for snoring.negative for changes in smell. negative for drainage.   Eyes: negative for eye watering. negative for eye itching. negative for vision changes. negative for eye redness.  Throat: negative for hoarseness. negative for sore throat. negative for trouble swallowing.   Lungs: negative for shortness of breath.negative for wheezing. negative for sputum production.   Cardiovascular: negative for chest pain. negative for swelling of ankles. negative for fast or irregular heartbeat.   Gastrointestinal: negative for nausea. negative for heartburn. negative for acid reflux.   Musculoskeletal: negative for joint pain. negative for joint stiffness. negative for joint swelling.   Neurologic: negative for seizures. negative for fainting. negative for weakness.   Psychiatric: negative for changes in mood. negative for anxiety.   Endocrine: negative for cold intolerance. negative for heat intolerance. negative for tremors.   Lymphatic: negative for lower extremity swelling. negative for lymph node swelling.   Hematologic: negative for easy bruising. negative for easy bleeding.  Integumentary: negative for rash. negative for scaling. negative for nail changes.       Current Outpatient Medications:      calcium carbonate (OS-EDIS 500 MG [place]. [place]) 1250 MG tablet, Take 1 tablet by mouth daily, Disp: , Rfl:      EPINEPHrine (EPIPEN 2-ROMRAIO) 0.3 MG/0.3ML injection 2-pack, Inject 0.3 mLs (0.3 mg) into the muscle as needed for anaphylaxis, Disp: 0.6 mL, Rfl: 1     fexofenadine-pseudoePHEDrine (ALLEGRA-D 24) 180-240 MG 24  hr tablet, Take 1 tablet by mouth daily, Disp: 30 tablet, Rfl: 11     ibuprofen (ADVIL,MOTRIN) 200 MG tablet, Take 400 mg by mouth every 4 hours as needed Reported on 2/27/2017, Disp: , Rfl:      loratadine (CLARITIN) 10 MG tablet, Take 10 mg by mouth daily, Disp: , Rfl:      montelukast (SINGULAIR) 10 MG tablet, Take 1 tablet (10 mg) by mouth At Bedtime, Disp: 30 tablet, Rfl: 11     olopatadine (PATANOL) 0.1 % ophthalmic solution, Place 1 drop into both eyes 2 times daily, Disp: 1 Bottle, Rfl: 4     ORDER FOR ALLERGEN IMMUNOTHERAPY, Cat Hair, Standardized A.P. 10,000 BAU/mL, HS  2.0 ml  Alternaria Tenuis 1:10 w/v, HS  0.5 ml Aspergillus Fumigatus 1:10 w/v, HS  0.5 ml Epicoccum Nigrum 1:10 w/v, HS 0.5 ml Penicillium Notatum 1:10 w/v, HS  0.5 ml Diluent: HSA qs to 5ml, Disp: 5 mL, Rfl: prn     ORDER FOR ALLERGEN IMMUNOTHERAPY, Abdiaziz, White 1:20 w/v, HS  0.5 ml Birch Mix PRW 1:20 w/v, HS  0.5 ml Boxelder-Maple Mix BHR (Boxelder Hard Red) 1:20 w/v, HS  0.5 ml Cedar, Red 1:20 w/v, HS  0.5 ml [place], Common 1:20 w/v, HS  0.5 ml Elm, American 1:20 w/v, HS  0.5 ml Gill Mix RW 1:20 w/v, HS 0.5 ml Oak Mix RVW 1:20 w/v, HS 0.5 ml Pine, White 1:20 w/v, ALK 0.5 ml [place] Tree, Black 1:20 w/v, HS 0.5 ml Diluent: HSA qs to 5ml, Disp: 5 mL, Rfl: prn     ORDER FOR ALLERGEN IMMUNOTHERAPY, Dog Hair-Dander, A. P.  1:100 w/v, HS  1.0 ml Dust Mites DF. 10,000 AU/mL, HS  0.5 ml Dust Mites DP. 10,000 AU/mL, HS  0.5 ml  Richard Grass 1:20 w/v, HS 0.5 ml Bonilla Grass (Std) 100,000 BAU/mL, HS 0.4 ml Diluent: HSA qs to 5ml, Disp: 5 mL, Rfl: prn     ORDER FOR ALLERGEN IMMUNOTHERAPY, Kochia 1:20 w/v, HS 0.5 ml Lamb's Quarters 1:20 w/v, HS 0.5 ml Nettle 1:20 w/v, HS 0.5 ml Plantain, English 1:20 w/v, HS 0.5 ml Ragweed, Mix (Giant, Short) 1:20 w/v, HS 0.5 ml Russian Thistle 1:20 w/v, HS 0.5 ml Sagebrush, Mugwort 1:20 w/v, HS 0.5 ml Sorrel, Sheep 1:20 w/v, HS 0.5 ml Diluent: HSA qs to 5ml, Disp: 5 mL, Rfl: prn     SUMAtriptan (IMITREX) 50  MG tablet, Take 1 tablet (50 mg) by mouth See Admin Instructions For ONSET OF MIGRAINE, MAY REPEAT ONCE AFTER 2 HRS. DO NOT EXCEED 4 TABLETS IN 24 HRS., Disp: 12 tablet, Rfl: 1     triamcinolone (NASACORT) 55 MCG/ACT nasal aerosol, Spray 2 sprays into both nostrils daily, Disp: 1 Bottle, Rfl: 11     UNABLE TO FIND, Apply topically daily MEDICATION NAME: testosterone cream, Disp: , Rfl:      UNABLE TO FIND, Place 1 drop into both eyes daily MEDICATION NAME: Alaway 0.035%, Disp: , Rfl:      VITAMIN D, CHOLECALCIFEROL, PO, Take 10,000 Units by mouth daily, Disp: , Rfl:   Immunization History   Administered Date(s) Administered     TD (ADULT, 7+) 11/02/2000     TDAP Vaccine (Adacel) 07/23/2010     Allergies   Allergen Reactions     No Known Drug Allergies      Peanuts [Nuts] Hives     Seasonal Allergies      Shellfish-Derived Products Itching     EXAM:   Constitutional:  Appears well-developed and well-nourished. No distress.   HEENT:   Head: Normocephalic.   No cobblestoning of posterior oropharynx.   Nasal tissue pink and normal appearing.  No rhinorrhea noted.    Eyes: Conjunctivae are non-erythematous   Cardiovascular: Normal rate, regular rhythm and normal heart sounds. Exam reveals no gallop and no friction rub.   No murmur heard.  Respiratory: Effort normal and breath sounds normal. No respiratory distress. No wheezes. No rales.   Musculoskeletal: Normal range of motion.   Neuro: Oriented to person, place, and time.  Skin: Skin is warm and dry. No rash noted.   Psychiatric: Normal mood and affect.     Nursing note and vitals reviewed.    ASSESSMENT/PLAN:  Problem List Items Addressed This Visit        Respiratory    Chronic seasonal allergic rhinitis due to pollen - Primary     Perennial with spring and fall worsening nasal and ocular symptoms.  Current treatment is loratadine and Alaway.  Did not tolerate nasal steroid.     Skin testing with nonreactive histamine.  Cannot interpret negative results.  Oak was  positive.  Blood testing positive for cat, dog, mold, trees, grass, weeds and dust mite.    Cluster immunotherapy   The patient received green 0.1, green 0.4 and blue 0.1ml today. Each dose was  by 30 minutes. Full report will be scanned into electronic medical record. The pateint was in office for over 1.5 hours.     -Singulair 10mg by mouth daily at night.   -Allegra-D daily  -Ketotifen 1 drop/eye twice daily as needed for allergy symptoms or Patanol (olapatdine) 1 drop/eye twice daily as needed.          Relevant Medications    triamcinolone (NASACORT) 55 MCG/ACT nasal aerosol    Other Relevant Orders    RAPID DESENSITIZATION    Allergic rhinitis due to mold    Relevant Medications    triamcinolone (NASACORT) 55 MCG/ACT nasal aerosol    Other Relevant Orders    RAPID DESENSITIZATION    Allergic rhinitis due to dust mite    Relevant Medications    triamcinolone (NASACORT) 55 MCG/ACT nasal aerosol    Other Relevant Orders    RAPID DESENSITIZATION (Completed)    Allergic rhinitis due to animal dander    Relevant Medications    triamcinolone (NASACORT) 55 MCG/ACT nasal aerosol    Other Relevant Orders    RAPID DESENSITIZATION (Completed)          Chart documentation with Dragon Voice recognition Software. Although reviewed after completion, some words and grammatical errors may remain.    Jesús Lowry DO FAAAAI  Medical Director for Allergy/Immunology at [place], [place]      Again, thank you for allowing me to participate in the care of your patient.        Sincerely,        Jesús Lowry DO

## 2020-09-15 ENCOUNTER — OFFICE VISIT (OUTPATIENT)
Dept: ALLERGY | Facility: OTHER | Age: 50
End: 2020-09-15
Payer: COMMERCIAL

## 2020-09-15 VITALS
BODY MASS INDEX: 26.68 KG/M2 | SYSTOLIC BLOOD PRESSURE: 120 MMHG | WEIGHT: 170 LBS | OXYGEN SATURATION: 100 % | DIASTOLIC BLOOD PRESSURE: 72 MMHG | TEMPERATURE: 98.5 F | HEIGHT: 67 IN | HEART RATE: 99 BPM

## 2020-09-15 DIAGNOSIS — J30.89 ALLERGIC RHINITIS DUE TO MOLD: ICD-10-CM

## 2020-09-15 DIAGNOSIS — J30.1 CHRONIC SEASONAL ALLERGIC RHINITIS DUE TO POLLEN: Primary | ICD-10-CM

## 2020-09-15 DIAGNOSIS — J30.81 ALLERGIC RHINITIS DUE TO ANIMAL DANDER: ICD-10-CM

## 2020-09-15 DIAGNOSIS — J30.89 ALLERGIC RHINITIS DUE TO DUST MITE: ICD-10-CM

## 2020-09-15 PROCEDURE — 99207 ZZC DROP WITH A PROCEDURE: CPT | Performed by: ALLERGY & IMMUNOLOGY

## 2020-09-15 PROCEDURE — 95180 RAPID DESENSITIZATION: CPT | Performed by: ALLERGY & IMMUNOLOGY

## 2020-09-15 ASSESSMENT — MIFFLIN-ST. JEOR: SCORE: 1423.74

## 2020-09-15 NOTE — LETTER
9/15/2020         RE: Sumaya Gatica  [id number]  [place] [address]        Dear Colleague,    Thank you for referring your patient, Sumaya Gatica, to the [facility]. Please see a copy of my visit note below.    Sumaya Gatica is a 50 year old White female with previous medical history significant for allergic rhinitis who returns for a follow up visit.     Patient presents today for cluster immunotherapy. The patient is currently in a good state of health. No recent fevers, chills, cough, wheezing, shortness of breath, skin rash, angioedema, nausea, vomiting or diarrhea. The risks and benefits were discussed and the patient/patient's family wishes to proceed. The consent was signed.    Past Medical History:   Diagnosis Date     Abnormal Pap smear, can't excl hi gd sq intraepithelial lesion (ASC-H) 3/22/07    negative colposcopy     History of colposcopy with cervical biopsy 07     Ovarian cysts      Rosacea      Family History   Problem Relation Age of Onset     Alcohol/Drug Paternal Grandfather         alcohlism     Other Cancer Paternal Grandfather      Cancer Maternal Grandfather         gallbladder     Diabetes Maternal Grandmother              Cerebrovascular Disease Paternal Grandmother      Alzheimer Disease Paternal Grandmother         and dementia     Prostate Cancer Brother      Diabetes Mother      Alzheimer Disease Mother      Cancer Brother         skin     Past Surgical History:   Procedure Laterality Date     COLONOSCOPY  2012    Procedure: COLONOSCOPY;  Colonscopy Screening, Diverticulitis;  Surgeon: Reilly Holt MD;  Location:  GI     DILATION AND CURETTAGE, HYSTEROSCOPY, ABLATE ENDOMETRIUM NOVASURE, COMBINED  2011    Procedure:COMBINED DILATION AND CURETTAGE, HYSTEROSCOPY, ABLATE ENDOMETRIUM NOVASURE; hysteroscopy, dialtion and curettage, novasure endometrial ablation  ; Surgeon:MILAD VILLARREAL; Location: OR      GYN SURGERY  2015    [facility]      HC REMOVAL OF TONSILS,<13 Y/O       HC REPAIR OF NASAL SEPTUM  12/30/08    Septoplasty, submucosal resection inferior turbinates.     HYSTERECTOMY, PAP NO LONGER INDICATED         REVIEW OF SYSTEMS:  General: negative for weight gain. negative for weight loss. negative for changes in sleep.   Ears: negative for fullness. negative for hearing loss. negative for dizziness.   Nose: negative for snoring.negative for changes in smell. negative for drainage.   Eyes: negative for eye watering. negative for eye itching. negative for vision changes. negative for eye redness.  Throat: negative for hoarseness. negative for sore throat. negative for trouble swallowing.   Lungs: negative for shortness of breath.negative for wheezing. negative for sputum production.   Cardiovascular: negative for chest pain. negative for swelling of ankles. negative for fast or irregular heartbeat.   Gastrointestinal: negative for nausea. negative for heartburn. negative for acid reflux.   Musculoskeletal: negative for joint pain. negative for joint stiffness. negative for joint swelling.   Neurologic: negative for seizures. negative for fainting. negative for weakness.   Psychiatric: negative for changes in mood. negative for anxiety.   Endocrine: negative for cold intolerance. negative for heat intolerance. negative for tremors.   Lymphatic: negative for lower extremity swelling. negative for lymph node swelling.   Hematologic: negative for easy bruising. negative for easy bleeding.  Integumentary: negative for rash. negative for scaling. negative for nail changes.       Current Outpatient Medications:      calcium carbonate (OS-EDIS 500 MG [place]. [place]) 1250 MG tablet, Take 1 tablet by mouth daily, Disp: , Rfl:      EPINEPHrine (EPIPEN 2-ROMARIO) 0.3 MG/0.3ML injection 2-pack, Inject 0.3 mLs (0.3 mg) into the muscle as needed for anaphylaxis, Disp: 0.6 mL, Rfl: 1     fexofenadine-pseudoePHEDrine (ALLEGRA-D 24) 180-240 MG  24 hr tablet, Take 1 tablet by mouth daily, Disp: 30 tablet, Rfl: 11     ibuprofen (ADVIL,MOTRIN) 200 MG tablet, Take 400 mg by mouth every 4 hours as needed Reported on 2/27/2017, Disp: , Rfl:      loratadine (CLARITIN) 10 MG tablet, Take 10 mg by mouth daily, Disp: , Rfl:      montelukast (SINGULAIR) 10 MG tablet, Take 1 tablet (10 mg) by mouth At Bedtime, Disp: 30 tablet, Rfl: 11     olopatadine (PATANOL) 0.1 % ophthalmic solution, Place 1 drop into both eyes 2 times daily, Disp: 1 Bottle, Rfl: 4     ORDER FOR ALLERGEN IMMUNOTHERAPY, Cat Hair, Standardized A.P. 10,000 BAU/mL, HS  2.0 ml  Alternaria Tenuis 1:10 w/v, HS  0.5 ml Aspergillus Fumigatus 1:10 w/v, HS  0.5 ml Epicoccum Nigrum 1:10 w/v, HS 0.5 ml Penicillium Notatum 1:10 w/v, HS  0.5 ml Diluent: HSA qs to 5ml, Disp: 5 mL, Rfl: prn     ORDER FOR ALLERGEN IMMUNOTHERAPY, Abdiaziz, White 1:20 w/v, HS  0.5 ml Birch Mix PRW 1:20 w/v, HS  0.5 ml Boxelder-Maple Mix BHR (Boxelder Hard Red) 1:20 w/v, HS  0.5 ml Cedar, Red 1:20 w/v, HS  0.5 ml [place], Common 1:20 w/v, HS  0.5 ml Elm, American 1:20 w/v, HS  0.5 ml [place] Mix RW 1:20 w/v, HS 0.5 ml Oak Mix RVW 1:20 w/v, HS 0.5 ml Pine, White 1:20 w/v, ALK 0.5 ml [place] Tree, Black 1:20 w/v, HS 0.5 ml Diluent: HSA qs to 5ml, Disp: 5 mL, Rfl: prn     ORDER FOR ALLERGEN IMMUNOTHERAPY, Dog Hair-Dander, A. P.  1:100 w/v, HS  1.0 ml Dust Mites DF. 10,000 AU/mL, HS  0.5 ml Dust Mites DP. 10,000 AU/mL, HS  0.5 ml  Ric[place]rd Grass 1:20 w/v, HS 0.5 ml Bonilla Grass (Std) 100,000 BAU/mL, HS 0.4 ml Diluent: HSA qs to 5ml, Disp: 5 mL, Rfl: prn     ORDER FOR ALLERGEN IMMUNOTHERAPY, Kochia 1:20 w/v, HS 0.5 ml Lamb's Quarters 1:20 w/v, HS 0.5 ml Nettle 1:20 w/v, HS 0.5 ml Plantain, English 1:20 w/v, HS 0.5 ml Ragweed, Mix (Giant, Short) 1:20 w/v, HS 0.5 ml Russian Thistle 1:20 w/v, HS 0.5 ml Sagebrush, Mugwort 1:20 w/v, HS 0.5 ml Sorrel, Sheep 1:20 w/v, HS 0.5 ml Diluent: HSA qs to 5ml, Disp: 5 mL, Rfl: prn     SUMAtriptan (IMITREX)  50 MG tablet, Take 1 tablet (50 mg) by mouth See Admin Instructions For ONSET OF MIGRAINE, MAY REPEAT ONCE AFTER 2 HRS. DO NOT EXCEED 4 TABLETS IN 24 HRS., Disp: 12 tablet, Rfl: 1     triamcinolone (NASACORT) 55 MCG/ACT nasal aerosol, Spray 2 sprays into both nostrils daily, Disp: 1 Bottle, Rfl: 11     UNABLE TO FIND, Apply topically daily MEDICATION NAME: testosterone cream, Disp: , Rfl:      UNABLE TO FIND, Place 1 drop into both eyes daily MEDICATION NAME: Alaway 0.035%, Disp: , Rfl:      VITAMIN D, CHOLECALCIFEROL, PO, Take 10,000 Units by mouth daily, Disp: , Rfl:   Immunization History   Administered Date(s) Administered     TD (ADULT, 7+) 11/02/2000     TDAP Vaccine (Adacel) 07/23/2010     Allergies   Allergen Reactions     No Known Drug Allergies      Peanuts [Nuts] Hives     Seasonal Allergies      Shellfish-Derived Products Itching         EXAM:   Constitutional:  Appears well-developed and well-nourished. No distress.   HEENT:   Head: Normocephalic.   Cardiovascular: Normal rate, regular rhythm and normal heart sounds. Exam reveals no gallop and no friction rub.   No murmur heard.  Respiratory: Effort normal and breath sounds normal. No respiratory distress. No wheezes. No rales.   Musculoskeletal: Normal range of motion.   Neuro: Oriented to person, place, and time.  Skin: Skin is warm and dry. No rash noted.   Psychiatric: Normal mood and affect.     Nursing note and vitals reviewed.    ASSESSMENT/PLAN:  Problem List Items Addressed This Visit        Respiratory    Chronic seasonal allergic rhinitis due to pollen - Primary     Perennial with spring and fall worsening nasal and ocular symptoms.  Current treatment is loratadine and Alaway.  Did not tolerate nasal steroid.     Skin testing with nonreactive histamine.  Cannot interpret negative results.  Oak was positive.  Blood testing positive for cat, dog, mold, trees, grass, weeds and dust mite.    Cluster immunotherapy   The patient received blue 0.2,  blue 0.4, and yellow 0.05. Each dose was  by 30 minutes. Full report will be scanned into electronic medical record. The patient was in office for over 1.5 hours.     -Singulair 10mg by mouth daily at night.   -Allegra-D daily  -Ketotifen 1 drop/eye twice daily as needed for allergy symptoms or Patanol (olapatdine) 1 drop/eye twice daily as needed.         Relevant Orders    RAPID DESENSITIZATION    Allergic rhinitis due to mold    Relevant Orders    RAPID DESENSITIZATION    Allergic rhinitis due to dust mite    Relevant Orders    RAPID DESENSITIZATION    Allergic rhinitis due to animal dander    Relevant Orders    RAPID DESENSITIZATION (Completed)          Chart documentation with Dragon Voice recognition Software. Although reviewed after completion, some words and grammatical errors may remain.    Jesús Lowry DO FAAAAI  Medical Director for Allergy/Immunology at [place], [place]      Again, thank you for allowing me to participate in the care of your patient.        Sincerely,        Jesús Lowry DO

## 2020-09-15 NOTE — PROGRESS NOTES
Sumaya Gatica is a 50 year old White female with previous medical history significant for allergic rhinitis who returns for a follow up visit.     Patient presents today for cluster immunotherapy. The patient is currently in a good state of health. No recent fevers, chills, cough, wheezing, shortness of breath, skin rash, angioedema, nausea, vomiting or diarrhea. The risks and benefits were discussed and the patient/patient's family wishes to proceed. The consent was signed.    Past Medical History:   Diagnosis Date     Abnormal Pap smear, can't excl hi gd sq intraepithelial lesion (ASC-H) 3/22/07    negative colposcopy     History of colposcopy with cervical biopsy 07     Ovarian cysts      Rosacea      Family History   Problem Relation Age of Onset     Alcohol/Drug Paternal Grandfather         alcohlism     Other Cancer Paternal Grandfather      Cancer Maternal Grandfather         gallbladder     Diabetes Maternal Grandmother              Cerebrovascular Disease Paternal Grandmother      Alzheimer Disease Paternal Grandmother         and dementia     Prostate Cancer Brother      Diabetes Mother      Alzheimer Disease Mother      Cancer Brother         skin     Past Surgical History:   Procedure Laterality Date     COLONOSCOPY  2012    Procedure: COLONOSCOPY;  Colonscopy Screening, Diverticulitis;  Surgeon: Reilly Holt MD;  Location:  GI     DILATION AND CURETTAGE, HYSTEROSCOPY, ABLATE ENDOMETRIUM NOVASURE, COMBINED  2011    Procedure:COMBINED DILATION AND CURETTAGE, HYSTEROSCOPY, ABLATE ENDOMETRIUM NOVASURE; hysteroscopy, dialtion and curettage, novasure endometrial ablation  ; Surgeon:MILAD VILLARREAL; Location: OR     GYN SURGERY      [facility] REMOVAL OF TONSILS,<11 Y/O       HC REPAIR OF NASAL SEPTUM  08    Septoplasty, submucosal resection inferior turbinates.     HYSTERECTOMY, PAP NO LONGER INDICATED         REVIEW OF SYSTEMS:  General: negative for weight  gain. negative for weight loss. negative for changes in sleep.   Ears: negative for fullness. negative for hearing loss. negative for dizziness.   Nose: negative for snoring.negative for changes in smell. negative for drainage.   Eyes: negative for eye watering. negative for eye itching. negative for vision changes. negative for eye redness.  Throat: negative for hoarseness. negative for sore throat. negative for trouble swallowing.   Lungs: negative for shortness of breath.negative for wheezing. negative for sputum production.   Cardiovascular: negative for chest pain. negative for swelling of ankles. negative for fast or irregular heartbeat.   Gastrointestinal: negative for nausea. negative for heartburn. negative for acid reflux.   Musculoskeletal: negative for joint pain. negative for joint stiffness. negative for joint swelling.   Neurologic: negative for seizures. negative for fainting. negative for weakness.   Psychiatric: negative for changes in mood. negative for anxiety.   Endocrine: negative for cold intolerance. negative for heat intolerance. negative for tremors.   Lymphatic: negative for lower extremity swelling. negative for lymph node swelling.   Hematologic: negative for easy bruising. negative for easy bleeding.  Integumentary: negative for rash. negative for scaling. negative for nail changes.       Current Outpatient Medications:      calcium carbonate (OS-EDIS 500 MG [place]. CA) 1250 MG tablet, Take 1 tablet by mouth daily, Disp: , Rfl:      EPINEPHrine (EPIPEN 2-ROMARIO) 0.3 MG/0.3ML injection 2-pack, Inject 0.3 mLs (0.3 mg) into the muscle as needed for anaphylaxis, Disp: 0.6 mL, Rfl: 1     fexofenadine-pseudoePHEDrine (ALLEGRA-D 24) 180-240 MG 24 hr tablet, Take 1 tablet by mouth daily, Disp: 30 tablet, Rfl: 11     ibuprofen (ADVIL,MOTRIN) 200 MG tablet, Take 400 mg by mouth every 4 hours as needed Reported on 2/27/2017, Disp: , Rfl:      loratadine (CLARITIN) 10 MG tablet, Take 10 mg by mouth  daily, Disp: , Rfl:      montelukast (SINGULAIR) 10 MG tablet, Take 1 tablet (10 mg) by mouth At Bedtime, Disp: 30 tablet, Rfl: 11     olopatadine (PATANOL) 0.1 % ophthalmic solution, Place 1 drop into both eyes 2 times daily, Disp: 1 Bottle, Rfl: 4     ORDER FOR ALLERGEN IMMUNOTHERAPY, Cat Hair, Standardized A.P. 10,000 BAU/mL, HS  2.0 ml  Alternaria Tenuis 1:10 w/v, HS  0.5 ml Aspergillus Fumigatus 1:10 w/v, HS  0.5 ml Epicoccum Nigrum 1:10 w/v, HS 0.5 ml Penicillium Notatum 1:10 w/v, HS  0.5 ml Diluent: HSA qs to 5ml, Disp: 5 mL, Rfl: prn     ORDER FOR ALLERGEN IMMUNOTHERAPY, Abdiaziz, White 1:20 w/v, HS  0.5 ml Birch Mix PRW 1:20 w/v, HS  0.5 ml Boxelder-Maple Mix BHR (Boxelder Hard Red) 1:20 w/v, HS  0.5 ml Cedar, Red 1:20 w/v, HS  0.5 ml [place], Common 1:20 w/v, HS  0.5 ml Elm, American 1:20 w/v, HS  0.5 ml [place] Mix RW 1:20 w/v, HS 0.5 ml Oak Mix RVW 1:20 w/v, HS 0.5 ml Pine, White 1:20 w/v, ALK 0.5 ml [place] Tree, Black 1:20 w/v, HS 0.5 ml Diluent: HSA qs to 5ml, Disp: 5 mL, Rfl: prn     ORDER FOR ALLERGEN IMMUNOTHERAPY, Dog Hair-Dander, A. P.  1:100 w/v, HS  1.0 ml Dust Mites DF. 10,000 AU/mL, HS  0.5 ml Dust Mites DP. 10,000 AU/mL, HS  0.5 ml  Richard Grass 1:20 w/v, HS 0.5 ml Bonilla Grass (Std) 100,000 BAU/mL, HS 0.4 ml Diluent: HSA qs to 5ml, Disp: 5 mL, Rfl: prn     ORDER FOR ALLERGEN IMMUNOTHERAPY, Kochia 1:20 w/v, HS 0.5 ml Lamb's Quarters 1:20 w/v, HS 0.5 ml Nettle 1:20 w/v, HS 0.5 ml Plantain, English 1:20 w/v, HS 0.5 ml Ragweed, Mix (Giant, Short) 1:20 w/v, HS 0.5 ml Russian Thistle 1:20 w/v, HS 0.5 ml Sagebrush, Mugwort 1:20 w/v, HS 0.5 ml Sorrel, Sheep 1:20 w/v, HS 0.5 ml Diluent: HSA qs to 5ml, Disp: 5 mL, Rfl: prn     SUMAtriptan (IMITREX) 50 MG tablet, Take 1 tablet (50 mg) by mouth See Admin Instructions For ONSET OF MIGRAINE, MAY REPEAT ONCE AFTER 2 HRS. DO NOT EXCEED 4 TABLETS IN 24 HRS., Disp: 12 tablet, Rfl: 1     triamcinolone (NASACORT) 55 MCG/ACT nasal aerosol, Spray 2 sprays into  both nostrils daily, Disp: 1 Bottle, Rfl: 11     UNABLE TO FIND, Apply topically daily MEDICATION NAME: testosterone cream, Disp: , Rfl:      UNABLE TO FIND, Place 1 drop into both eyes daily MEDICATION NAME: Alaway 0.035%, Disp: , Rfl:      VITAMIN D, CHOLECALCIFEROL, PO, Take 10,000 Units by mouth daily, Disp: , Rfl:   Immunization History   Administered Date(s) Administered     TD (ADULT, 7+) 11/02/2000     TDAP Vaccine (Adacel) 07/23/2010     Allergies   Allergen Reactions     No Known Drug Allergies      Peanuts [Nuts] Hives     Seasonal Allergies      Shellfish-Derived Products Itching         EXAM:   Constitutional:  Appears well-developed and well-nourished. No distress.   HEENT:   Head: Normocephalic.   Cardiovascular: Normal rate, regular rhythm and normal heart sounds. Exam reveals no gallop and no friction rub.   No murmur heard.  Respiratory: Effort normal and breath sounds normal. No respiratory distress. No wheezes. No rales.   Musculoskeletal: Normal range of motion.   Neuro: Oriented to person, place, and time.  Skin: Skin is warm and dry. No rash noted.   Psychiatric: Normal mood and affect.     Nursing note and vitals reviewed.    ASSESSMENT/PLAN:  Problem List Items Addressed This Visit        Respiratory    Chronic seasonal allergic rhinitis due to pollen - Primary     Perennial with spring and fall worsening nasal and ocular symptoms.  Current treatment is loratadine and Alaway.  Did not tolerate nasal steroid.     Skin testing with nonreactive histamine.  Cannot interpret negative results.  Oak was positive.  Blood testing positive for cat, dog, mold, trees, grass, weeds and dust mite.    Cluster immunotherapy   The patient received blue 0.2, blue 0.4, and yellow 0.05. Each dose was  by 30 minutes. Full report will be scanned into electronic medical record. The patient was in office for over 1.5 hours.     -Singulair 10mg by mouth daily at night.   -Allegra-D daily  -Ketotifen 1  drop/eye twice daily as needed for allergy symptoms or Patanol (olapatdine) 1 drop/eye twice daily as needed.         Relevant Orders    RAPID DESENSITIZATION    Allergic rhinitis due to mold    Relevant Orders    RAPID DESENSITIZATION    Allergic rhinitis due to dust mite    Relevant Orders    RAPID DESENSITIZATION    Allergic rhinitis due to animal dander    Relevant Orders    RAPID DESENSITIZATION (Completed)          Chart documentation with Dragon Voice recognition Software. Although reviewed after completion, some words and grammatical errors may remain.    Jesús Lowry DO FAAAAI  Medical Director for Allergy/Immunology at [place], [place]

## 2020-09-15 NOTE — ASSESSMENT & PLAN NOTE
Perennial with spring and fall worsening nasal and ocular symptoms.  Current treatment is loratadine and Alaway.  Did not tolerate nasal steroid.     Skin testing with nonreactive histamine.  Cannot interpret negative results.  Oak was positive.  Blood testing positive for cat, dog, mold, trees, grass, weeds and dust mite.    Cluster immunotherapy   The patient received blue 0.2, blue 0.4, and yellow 0.05. Each dose was  by 30 minutes. Full report will be scanned into electronic medical record. The patient was in office for over 1.5 hours.     -Singulair 10mg by mouth daily at night.   -Allegra-D daily  -Ketotifen 1 drop/eye twice daily as needed for allergy symptoms or Patanol (olapatdine) 1 drop/eye twice daily as needed.

## 2020-09-15 NOTE — PATIENT INSTRUCTIONS
Allergy Staff Appt Hours Shot Hours Locations    Physician     Jesús Lowry DO       Support Staff     SKYLAR Hernadez, GIACOMO  Tuesday:        [place] 7-4:20     Wednesday:        [place]: 7-5     Thursday:                    [place] 7-6:40     Friday:  [place]  7-2:40   [place]        Thursday: 1-5:50        Friday: 7-10:50     [place]        Tuesday: 7- 3:20        Wednesday: 7-4:20     [place] Monday: 7-4:20        Tuesday: 1-6:20         [facility]  [address] [place] [address]  Appt Line: [phone]  Allergy RN:  [phone]    [facility] (formerly [facility])  [address] [place] [address]  Appt Line: [phone]  Allergy RN:  [phone]       Important Scheduling Information  Aspirin Desensitization: Appt will last 2 clinic days. Please call the Allergy RN line for your clinic to schedule. Discontinue antihistamines 7 days prior to the appointment.     Food Challenges: Appt will last 3-4 hours. Please call the Allergy RN line for your clinic to schedule. Discontinue antihistamines 7 days prior to the appointment.     Penicillin Testing: Appt will last 2-3 hours. Please call the Allergy RN line for your clinic to schedule. Discontinue antihistamines 7 days prior to the appointment.     Skin Testing: Appt will about 40 minutes. Call the appointment line for your clinic to schedule. Discontinue antihistamines 7 days prior to the appointment.     Venom Testing: Appt will last 2-3 hours. Please call the Allergy RN line for your clinic to schedule. Discontinue antihistamines 7 days prior to the appointment.     Thank you for trusting us with your Allergy, Asthma, and Immunology care. Please feel free to contact us with any questions or concerns you may have.

## 2020-09-22 ENCOUNTER — OFFICE VISIT (OUTPATIENT)
Dept: ALLERGY | Facility: OTHER | Age: 50
End: 2020-09-22
Payer: COMMERCIAL

## 2020-09-22 VITALS
WEIGHT: 170 LBS | OXYGEN SATURATION: 98 % | HEIGHT: 67 IN | SYSTOLIC BLOOD PRESSURE: 122 MMHG | HEART RATE: 93 BPM | TEMPERATURE: 97.7 F | DIASTOLIC BLOOD PRESSURE: 80 MMHG | BODY MASS INDEX: 26.68 KG/M2

## 2020-09-22 DIAGNOSIS — J30.1 CHRONIC SEASONAL ALLERGIC RHINITIS DUE TO POLLEN: ICD-10-CM

## 2020-09-22 DIAGNOSIS — J30.89 ALLERGIC RHINITIS DUE TO MOLD: Primary | ICD-10-CM

## 2020-09-22 DIAGNOSIS — J30.81 ALLERGIC RHINITIS DUE TO ANIMAL DANDER: ICD-10-CM

## 2020-09-22 DIAGNOSIS — J30.89 ALLERGIC RHINITIS DUE TO DUST MITE: ICD-10-CM

## 2020-09-22 PROCEDURE — 99207 ZZC DROP WITH A PROCEDURE: CPT | Performed by: ALLERGY & IMMUNOLOGY

## 2020-09-22 PROCEDURE — 95180 RAPID DESENSITIZATION: CPT | Performed by: ALLERGY & IMMUNOLOGY

## 2020-09-22 ASSESSMENT — PAIN SCALES - GENERAL: PAINLEVEL: SEVERE PAIN (7)

## 2020-09-22 ASSESSMENT — MIFFLIN-ST. JEOR: SCORE: 1423.74

## 2020-09-22 NOTE — ASSESSMENT & PLAN NOTE
Perennial with spring and fall worsening nasal and ocular symptoms.  Current treatment is loratadine and Alaway.  Did not tolerate nasal steroid.     Skin testing with nonreactive histamine.  Cannot interpret negative results.  Oak was positive.  Blood testing positive for cat, dog, mold, trees, grass, weeds and dust mite.     Cluster immunotherapy   The patient received yellow 0.1, yellow 0.15, and yellow 0.25. Each dose was  by 30 minutes. Full report will be scanned into electronic medical record. The patient was in office for over 1.5 hours.     -Singulair 10mg by mouth daily at night.   -Allegra-D daily  -Ketotifen 1 drop/eye twice daily as needed for allergy symptoms or Patanol (olapatdine) 1 drop/eye twice daily as needed.

## 2020-09-22 NOTE — LETTER
2020         RE: Sumaya Gatica  [address]  [place] [address]        Dear Colleague,    Thank you for referring your patient, Sumaya Gatica, to the [facility]. Please see a copy of my visit note below.    Sumaya Gatica is a 50 year old White female with previous medical history significant for allergic rhinitis who returns for a follow up visit.    Patient presents today for cluster immunotherapy. The patient is currently in a good state of health. No recent fevers, chills, cough, wheezing, shortness of breath, skin rash, angioedema, nausea, vomiting or diarrhea. The risks and benefits were discussed and the patient/patient's family wishes to proceed. The consent was signed.    Past Medical History:   Diagnosis Date     Abnormal Pap smear, can't excl hi gd sq intraepithelial lesion (ASC-H) 3/22/07    negative colposcopy     History of colposcopy with cervical biopsy 07     Ovarian cysts      Rosacea      Family History   Problem Relation Age of Onset     Alcohol/Drug Paternal Grandfather         alcohlism     Other Cancer Paternal Grandfather      Cancer Maternal Grandfather         gallbladder     Diabetes Maternal Grandmother              Cerebrovascular Disease Paternal Grandmother      Alzheimer Disease Paternal Grandmother         and dementia     Prostate Cancer Brother      Diabetes Mother      Alzheimer Disease Mother      Cancer Brother         skin     Past Surgical History:   Procedure Laterality Date     COLONOSCOPY  2012    Procedure: COLONOSCOPY;  Colonscopy Screening, Diverticulitis;  Surgeon: Reilly Holt MD;  Location:  GI     DILATION AND CURETTAGE, HYSTEROSCOPY, ABLATE ENDOMETRIUM NOVASURE, COMBINED  2011    Procedure:COMBINED DILATION AND CURETTAGE, HYSTEROSCOPY, ABLATE ENDOMETRIUM NOVASURE; hysteroscopy, dialtion and curettage, novasure endometrial ablation  ; Surgeon:MILAD VILLARREAL; Location: OR     GYN  SURGERY  2015    [facility]      HC REMOVAL OF TONSILS,<11 Y/O       HC REPAIR OF NASAL SEPTUM  12/30/08    Septoplasty, submucosal resection inferior turbinates.     HYSTERECTOMY, PAP NO LONGER INDICATED         REVIEW OF SYSTEMS:  General: negative for weight gain. negative for weight loss. negative for changes in sleep.   Ears: negative for fullness. negative for hearing loss. negative for dizziness.   Nose: negative for snoring.negative for changes in smell. negative for drainage.   Eyes: negative for eye watering. negative for eye itching. negative for vision changes. negative for eye redness.  Throat: negative for hoarseness. negative for sore throat. negative for trouble swallowing.   Lungs: negative for shortness of breath.negative for wheezing. negative for sputum production.   Cardiovascular: negative for chest pain. negative for swelling of ankles. negative for fast or irregular heartbeat.   Gastrointestinal: negative for nausea. negative for heartburn. negative for acid reflux.   Musculoskeletal: negative for joint pain. negative for joint stiffness. negative for joint swelling.   Neurologic: negative for seizures. negative for fainting. negative for weakness.   Psychiatric: negative for changes in mood. negative for anxiety.   Endocrine: negative for cold intolerance. negative for heat intolerance. negative for tremors.   Lymphatic: negative for lower extremity swelling. negative for lymph node swelling.   Hematologic: negative for easy bruising. negative for easy bleeding.  Integumentary: negative for rash. negative for scaling. negative for nail changes.       Current Outpatient Medications:      calcium carbonate (OS-EDIS 500 MG [place]. [place]) 1250 MG tablet, Take 1 tablet by mouth daily, Disp: , Rfl:      EPINEPHrine (EPIPEN 2-ROMARIO) 0.3 MG/0.3ML injection 2-pack, Inject 0.3 mLs (0.3 mg) into the muscle as needed for anaphylaxis, Disp: 0.6 mL, Rfl: 1     fexofenadine-pseudoePHEDrine (ALLEGRA-D 24) 180-240 MG 24  hr tablet, Take 1 tablet by mouth daily, Disp: 30 tablet, Rfl: 11     ibuprofen (ADVIL,MOTRIN) 200 MG tablet, Take 400 mg by mouth every 4 hours as needed Reported on 2/27/2017, Disp: , Rfl:      loratadine (CLARITIN) 10 MG tablet, Take 10 mg by mouth daily, Disp: , Rfl:      montelukast (SINGULAIR) 10 MG tablet, Take 1 tablet (10 mg) by mouth At Bedtime, Disp: 30 tablet, Rfl: 11     olopatadine (PATANOL) 0.1 % ophthalmic solution, Place 1 drop into both eyes 2 times daily, Disp: 1 Bottle, Rfl: 4     ORDER FOR ALLERGEN IMMUNOTHERAPY, Cat Hair, Standardized A.P. 10,000 BAU/mL, HS  2.0 ml  Alternaria Tenuis 1:10 w/v, HS  0.5 ml Aspergillus Fumigatus 1:10 w/v, HS  0.5 ml Epicoccum Nigrum 1:10 w/v, HS 0.5 ml Penicillium Notatum 1:10 w/v, HS  0.5 ml Diluent: HSA qs to 5ml, Disp: 5 mL, Rfl: prn     ORDER FOR ALLERGEN IMMUNOTHERAPY, Abdiaziz, White 1:20 w/v, HS  0.5 ml Birch Mix PRW 1:20 w/v, HS  0.5 ml Boxelder-Maple Mix BHR (Boxelder Hard Red) 1:20 w/v, HS  0.5 ml Cedar, Red 1:20 w/v, HS  0.5 ml [place], Common 1:20 w/v, HS  0.5 ml Elm, American 1:20 w/v, HS  0.5 ml [place] Mix RW 1:20 w/v, HS 0.5 ml Oak Mix RVW 1:20 w/v, HS 0.5 ml Pine, White 1:20 w/v, ALK 0.5 ml [place] Tree, Black 1:20 w/v, HS 0.5 ml Diluent: HSA qs to 5ml, Disp: 5 mL, Rfl: prn     ORDER FOR ALLERGEN IMMUNOTHERAPY, Dog Hair-Dander, A. P.  1:100 w/v, HS  1.0 ml Dust Mites DF. 10,000 AU/mL, HS  0.5 ml Dust Mites DP. 10,000 AU/mL, HS  0.5 ml  Richard Grass 1:20 w/v, HS 0.5 ml Bonilla Grass (Std) 100,000 BAU/mL, HS 0.4 ml Diluent: HSA qs to 5ml, Disp: 5 mL, Rfl: prn     ORDER FOR ALLERGEN IMMUNOTHERAPY, Kochia 1:20 w/v, HS 0.5 ml Lamb's Quarters 1:20 w/v, HS 0.5 ml Nettle 1:20 w/v, HS 0.5 ml Plantain, English 1:20 w/v, HS 0.5 ml Ragweed, Mix (Giant, Short) 1:20 w/v, HS 0.5 ml Russian Thistle 1:20 w/v, HS 0.5 ml Sagebrush, Mugwort 1:20 w/v, HS 0.5 ml Sorrel, Sheep 1:20 w/v, HS 0.5 ml Diluent: HSA qs to 5ml, Disp: 5 mL, Rfl: prn     SUMAtriptan (IMITREX) 50  MG tablet, Take 1 tablet (50 mg) by mouth See Admin Instructions For ONSET OF MIGRAINE, MAY REPEAT ONCE AFTER 2 HRS. DO NOT EXCEED 4 TABLETS IN 24 HRS., Disp: 12 tablet, Rfl: 1     triamcinolone (NASACORT) 55 MCG/ACT nasal aerosol, Spray 2 sprays into both nostrils daily, Disp: 1 Bottle, Rfl: 11     UNABLE TO FIND, Apply topically daily MEDICATION NAME: testosterone cream, Disp: , Rfl:      UNABLE TO FIND, Place 1 drop into both eyes daily MEDICATION NAME: Alaway 0.035%, Disp: , Rfl:      VITAMIN D, CHOLECALCIFEROL, PO, Take 10,000 Units by mouth daily, Disp: , Rfl:   Immunization History   Administered Date(s) Administered     TD (ADULT, 7+) 11/02/2000     TDAP Vaccine (Adacel) 07/23/2010     Allergies   Allergen Reactions     No Known Drug Allergies      Peanuts [Nuts] Hives     Seasonal Allergies      Shellfish-Derived Products Itching         EXAM:   Constitutional:  Appears well-developed and well-nourished. No distress.   HEENT:   Head: Normocephalic.   No cobblestoning of posterior oropharynx.   Nasal tissue pink and normal appearing.  No rhinorrhea noted.    Eyes: Conjunctivae are non-erythematous   Cardiovascular: Normal rate, regular rhythm and normal heart sounds. Exam reveals no gallop and no friction rub.   No murmur heard.  Respiratory: Effort normal and breath sounds normal. No respiratory distress. No wheezes. No rales.   Musculoskeletal: Normal range of motion.   Neuro: Oriented to person, place, and time.  Skin: Skin is warm and dry. No rash noted.   Psychiatric: Normal mood and affect.     Nursing note and vitals reviewed.    ASSESSMENT/PLAN:  Problem List Items Addressed This Visit        Respiratory    Chronic seasonal allergic rhinitis due to pollen     Perennial with spring and fall worsening nasal and ocular symptoms.  Current treatment is loratadine and Alaway.  Did not tolerate nasal steroid.     Skin testing with nonreactive histamine.  Cannot interpret negative results.  Oak was  positive.  Blood testing positive for cat, dog, mold, trees, grass, weeds and dust mite.     Cluster immunotherapy   The patient received yellow 0.1, yellow 0.15, and yellow 0.25. Each dose was  by 30 minutes. Full report will be scanned into electronic medical record. The patient was in office for over 1.5 hours.     -Singulair 10mg by mouth daily at night.   -Allegra-D daily  -Ketotifen 1 drop/eye twice daily as needed for allergy symptoms or Patanol (olapatdine) 1 drop/eye twice daily as needed.         Relevant Orders    RAPID DESENSITIZATION (Completed)    Allergic rhinitis due to mold - Primary    Relevant Orders    RAPID DESENSITIZATION (Completed)    Allergic rhinitis due to dust mite    Relevant Orders    RAPID DESENSITIZATION (Completed)    Allergic rhinitis due to animal dander    Relevant Orders    RAPID DESENSITIZATION (Completed)          Chart documentation with Dragon Voice recognition Software. Although reviewed after completion, some words and grammatical errors may remain.    Jesús Lowry DO FAAAAI  Medical Director for Allergy/Immunology at [place], [place]      Again, thank you for allowing me to participate in the care of your patient.        Sincerely,        Jesús Lowry DO

## 2020-09-22 NOTE — PROGRESS NOTES
Sumaya Gatica is a 50 year old White female with previous medical history significant for allergic rhinitis who returns for a follow up visit.    Patient presents today for cluster immunotherapy. The patient is currently in a good state of health. No recent fevers, chills, cough, wheezing, shortness of breath, skin rash, angioedema, nausea, vomiting or diarrhea. The risks and benefits were discussed and the patient/patient's family wishes to proceed. The consent was signed.    Past Medical History:   Diagnosis Date     Abnormal Pap smear, can't excl hi gd sq intraepithelial lesion (ASC-H) 3/22/07    negative colposcopy     History of colposcopy with cervical biopsy 07     Ovarian cysts      Rosacea      Family History   Problem Relation Age of Onset     Alcohol/Drug Paternal Grandfather         alcohlism     Other Cancer Paternal Grandfather      Cancer Maternal Grandfather         gallbladder     Diabetes Maternal Grandmother              Cerebrovascular Disease Paternal Grandmother      Alzheimer Disease Paternal Grandmother         and dementia     Prostate Cancer Brother      Diabetes Mother      Alzheimer Disease Mother      Cancer Brother         skin     Past Surgical History:   Procedure Laterality Date     COLONOSCOPY  2012    Procedure: COLONOSCOPY;  Colonscopy Screening, Diverticulitis;  Surgeon: Reilly Holt MD;  Location:  GI     DILATION AND CURETTAGE, HYSTEROSCOPY, ABLATE ENDOMETRIUM NOVASURE, COMBINED  2011    Procedure:COMBINED DILATION AND CURETTAGE, HYSTEROSCOPY, ABLATE ENDOMETRIUM NOVASURE; hysteroscopy, dialtion and curettage, novasure endometrial ablation  ; Surgeon:MILAD VILLARREAL; Location: OR     GYN SURGERY      [facility] REMOVAL OF TONSILS,<11 Y/O       HC REPAIR OF NASAL SEPTUM  08    Septoplasty, submucosal resection inferior turbinates.     HYSTERECTOMY, PAP NO LONGER INDICATED         REVIEW OF SYSTEMS:  General: negative for weight  gain. negative for weight loss. negative for changes in sleep.   Ears: negative for fullness. negative for hearing loss. negative for dizziness.   Nose: negative for snoring.negative for changes in smell. negative for drainage.   Eyes: negative for eye watering. negative for eye itching. negative for vision changes. negative for eye redness.  Throat: negative for hoarseness. negative for sore throat. negative for trouble swallowing.   Lungs: negative for shortness of breath.negative for wheezing. negative for sputum production.   Cardiovascular: negative for chest pain. negative for swelling of ankles. negative for fast or irregular heartbeat.   Gastrointestinal: negative for nausea. negative for heartburn. negative for acid reflux.   Musculoskeletal: negative for joint pain. negative for joint stiffness. negative for joint swelling.   Neurologic: negative for seizures. negative for fainting. negative for weakness.   Psychiatric: negative for changes in mood. negative for anxiety.   Endocrine: negative for cold intolerance. negative for heat intolerance. negative for tremors.   Lymphatic: negative for lower extremity swelling. negative for lymph node swelling.   Hematologic: negative for easy bruising. negative for easy bleeding.  Integumentary: negative for rash. negative for scaling. negative for nail changes.       Current Outpatient Medications:      calcium carbonate (OS-EDIS 500 MG [place]. CA) 1250 MG tablet, Take 1 tablet by mouth daily, Disp: , Rfl:      EPINEPHrine (EPIPEN 2-ROMARIO) 0.3 MG/0.3ML injection 2-pack, Inject 0.3 mLs (0.3 mg) into the muscle as needed for anaphylaxis, Disp: 0.6 mL, Rfl: 1     fexofenadine-pseudoePHEDrine (ALLEGRA-D 24) 180-240 MG 24 hr tablet, Take 1 tablet by mouth daily, Disp: 30 tablet, Rfl: 11     ibuprofen (ADVIL,MOTRIN) 200 MG tablet, Take 400 mg by mouth every 4 hours as needed Reported on 2/27/2017, Disp: , Rfl:      loratadine (CLARITIN) 10 MG tablet, Take 10 mg by mouth  daily, Disp: , Rfl:      montelukast (SINGULAIR) 10 MG tablet, Take 1 tablet (10 mg) by mouth At Bedtime, Disp: 30 tablet, Rfl: 11     olopatadine (PATANOL) 0.1 % ophthalmic solution, Place 1 drop into both eyes 2 times daily, Disp: 1 Bottle, Rfl: 4     ORDER FOR ALLERGEN IMMUNOTHERAPY, Cat Hair, Standardized A.P. 10,000 BAU/mL, HS  2.0 ml  Alternaria Tenuis 1:10 w/v, HS  0.5 ml Aspergillus Fumigatus 1:10 w/v, HS  0.5 ml Epicoccum Nigrum 1:10 w/v, HS 0.5 ml Penicillium Notatum 1:10 w/v, HS  0.5 ml Diluent: HSA qs to 5ml, Disp: 5 mL, Rfl: prn     ORDER FOR ALLERGEN IMMUNOTHERAPY, Abdiaziz, White 1:20 w/v, HS  0.5 ml Birch Mix PRW 1:20 w/v, HS  0.5 ml Boxelder-Maple Mix BHR (Boxelder Hard Red) 1:20 w/v, HS  0.5 ml Cedar, Red 1:20 w/v, HS  0.5 ml [place], Common 1:20 w/v, HS  0.5 ml Elm, American 1:20 w/v, HS  0.5 ml [place] Mix RW 1:20 w/v, HS 0.5 ml Oak Mix RVW 1:20 w/v, HS 0.5 ml Pine, White 1:20 w/v, ALK 0.5 ml [place] Tree, Black 1:20 w/v, HS 0.5 ml Diluent: HSA qs to 5ml, Disp: 5 mL, Rfl: prn     ORDER FOR ALLERGEN IMMUNOTHERAPY, Dog Hair-Dander, A. P.  1:100 w/v, HS  1.0 ml Dust Mites DF. 10,000 AU/mL, HS  0.5 ml Dust Mites DP. 10,000 AU/mL, HS  0.5 ml  Richard Grass 1:20 w/v, HS 0.5 ml Bonilla Grass (Std) 100,000 BAU/mL, HS 0.4 ml Diluent: HSA qs to 5ml, Disp: 5 mL, Rfl: prn     ORDER FOR ALLERGEN IMMUNOTHERAPY, Kochia 1:20 w/v, HS 0.5 ml Lamb's Quarters 1:20 w/v, HS 0.5 ml Nettle 1:20 w/v, HS 0.5 ml Plantain, English 1:20 w/v, HS 0.5 ml Ragweed, Mix (Giant, Short) 1:20 w/v, HS 0.5 ml Russian Thistle 1:20 w/v, HS 0.5 ml Sagebrush, Mugwort 1:20 w/v, HS 0.5 ml Sorrel, Sheep 1:20 w/v, HS 0.5 ml Diluent: HSA qs to 5ml, Disp: 5 mL, Rfl: prn     SUMAtriptan (IMITREX) 50 MG tablet, Take 1 tablet (50 mg) by mouth See Admin Instructions For ONSET OF MIGRAINE, MAY REPEAT ONCE AFTER 2 HRS. DO NOT EXCEED 4 TABLETS IN 24 HRS., Disp: 12 tablet, Rfl: 1     triamcinolone (NASACORT) 55 MCG/ACT nasal aerosol, Spray 2 sprays into  both nostrils daily, Disp: 1 Bottle, Rfl: 11     UNABLE TO FIND, Apply topically daily MEDICATION NAME: testosterone cream, Disp: , Rfl:      UNABLE TO FIND, Place 1 drop into both eyes daily MEDICATION NAME: Alaway 0.035%, Disp: , Rfl:      VITAMIN D, CHOLECALCIFEROL, PO, Take 10,000 Units by mouth daily, Disp: , Rfl:   Immunization History   Administered Date(s) Administered     TD (ADULT, 7+) 11/02/2000     TDAP Vaccine (Adacel) 07/23/2010     Allergies   Allergen Reactions     No Known Drug Allergies      Peanuts [Nuts] Hives     Seasonal Allergies      Shellfish-Derived Products Itching         EXAM:   Constitutional:  Appears well-developed and well-nourished. No distress.   HEENT:   Head: Normocephalic.   No cobblestoning of posterior oropharynx.   Nasal tissue pink and normal appearing.  No rhinorrhea noted.    Eyes: Conjunctivae are non-erythematous   Cardiovascular: Normal rate, regular rhythm and normal heart sounds. Exam reveals no gallop and no friction rub.   No murmur heard.  Respiratory: Effort normal and breath sounds normal. No respiratory distress. No wheezes. No rales.   Musculoskeletal: Normal range of motion.   Neuro: Oriented to person, place, and time.  Skin: Skin is warm and dry. No rash noted.   Psychiatric: Normal mood and affect.     Nursing note and vitals reviewed.    ASSESSMENT/PLAN:  Problem List Items Addressed This Visit        Respiratory    Chronic seasonal allergic rhinitis due to pollen     Perennial with spring and fall worsening nasal and ocular symptoms.  Current treatment is loratadine and Alaway.  Did not tolerate nasal steroid.     Skin testing with nonreactive histamine.  Cannot interpret negative results.  Oak was positive.  Blood testing positive for cat, dog, mold, trees, grass, weeds and dust mite.     Cluster immunotherapy   The patient received yellow 0.1, yellow 0.15, and yellow 0.25. Each dose was  by 30 minutes. Full report will be scanned into  electronic medical record. The patient was in office for over 1.5 hours.     -Singulair 10mg by mouth daily at night.   -Allegra-D daily  -Ketotifen 1 drop/eye twice daily as needed for allergy symptoms or Patanol (olapatdine) 1 drop/eye twice daily as needed.         Relevant Orders    RAPID DESENSITIZATION (Completed)    Allergic rhinitis due to mold - Primary    Relevant Orders    RAPID DESENSITIZATION (Completed)    Allergic rhinitis due to dust mite    Relevant Orders    RAPID DESENSITIZATION (Completed)    Allergic rhinitis due to animal dander    Relevant Orders    RAPID DESENSITIZATION (Completed)          Chart documentation with Dragon Voice recognition Software. Although reviewed after completion, some words and grammatical errors may remain.    Jesús Lowry DO FAAAAI  Medical Director for Allergy/Immunology at [place], [place]

## 2020-09-22 NOTE — PATIENT INSTRUCTIONS
Allergy Staff Appt Hours Shot Hours Locations    Physician     Jesús Lowry DO       Support Staff     SKYLAR Hernadez, GIACOMO  Tuesday:        [place] 7-4:20     Wednesday:        [place]: 7-5     Thursday:                    [place] 7-6:40     Friday:  [place]  7-2:40   [place]        Thursday: 1-5:50        Friday: 7-10:50     [place]        Tuesday: 7- 3:20        Wednesday: 7-4:20     [place] Monday: 7-4:20        Tuesday: 1-6:20         [facility]  [address] [place] [address]  Appt Line: [phone]  Allergy RN:  [phone]    [facility]  [address] [place] [address]  Appt Line: [phone]  Allergy RN:  [phone]       Important Scheduling Information  Aspirin Desensitization: Appt will last 2 clinic days. Please call the Allergy RN line for your clinic to schedule. Discontinue antihistamines 7 days prior to the appointment.     Food Challenges: Appt will last 3-4 hours. Please call the Allergy RN line for your clinic to schedule. Discontinue antihistamines 7 days prior to the appointment.     Penicillin Testing: Appt will last 2-3 hours. Please call the Allergy RN line for your clinic to schedule. Discontinue antihistamines 7 days prior to the appointment.     Skin Testing: Appt will about 40 minutes. Call the appointment line for your clinic to schedule. Discontinue antihistamines 7 days prior to the appointment.     Venom Testing: Appt will last 2-3 hours. Please call the Allergy RN line for your clinic to schedule. Discontinue antihistamines 7 days prior to the appointment.     Thank you for trusting us with your Allergy, Asthma, and Immunology care. Please feel free to contact us with any questions or concerns you may have.

## 2020-09-29 ENCOUNTER — OFFICE VISIT (OUTPATIENT)
Dept: ALLERGY | Facility: OTHER | Age: 50
End: 2020-09-29
Payer: COMMERCIAL

## 2020-09-29 VITALS
WEIGHT: 170 LBS | BODY MASS INDEX: 26.68 KG/M2 | HEIGHT: 67 IN | DIASTOLIC BLOOD PRESSURE: 76 MMHG | HEART RATE: 100 BPM | SYSTOLIC BLOOD PRESSURE: 120 MMHG | OXYGEN SATURATION: 98 %

## 2020-09-29 DIAGNOSIS — J30.89 ALLERGIC RHINITIS DUE TO DUST MITE: ICD-10-CM

## 2020-09-29 DIAGNOSIS — J30.89 ALLERGIC RHINITIS DUE TO MOLD: ICD-10-CM

## 2020-09-29 DIAGNOSIS — J30.1 CHRONIC SEASONAL ALLERGIC RHINITIS DUE TO POLLEN: Primary | ICD-10-CM

## 2020-09-29 DIAGNOSIS — J30.81 ALLERGIC RHINITIS DUE TO ANIMAL DANDER: ICD-10-CM

## 2020-09-29 PROCEDURE — 95180 RAPID DESENSITIZATION: CPT | Performed by: ALLERGY & IMMUNOLOGY

## 2020-09-29 PROCEDURE — 99207 ZZC DROP WITH A PROCEDURE: CPT | Performed by: ALLERGY & IMMUNOLOGY

## 2020-09-29 ASSESSMENT — PAIN SCALES - GENERAL: PAINLEVEL: NO PAIN (0)

## 2020-09-29 ASSESSMENT — MIFFLIN-ST. JEOR: SCORE: 1423.74

## 2020-09-29 NOTE — PATIENT INSTRUCTIONS
Allergy Staff Appt Hours Shot Hours Locations    Physician     Jesús Lowry DO       Support Staff     SKYLAR Hernadez, GIACOMO  Tuesday:        [place] 7-4:20     Wednesday:        [place]: 7-5     Thursday:                    [place] 7-6:40     Friday:  [place]  7-2:40   [place]        Thursday: 1-5:50        Friday: 7-10:50     [place]        Tuesday: 7- 3:20        Wednesday: 7-4:20     [place] Monday: 7-4:20        Tuesday: 1-6:20         [facility]  [address] [place] [address]  Appt Line: [phone]  Allergy RN:  [phone]    [facility]  [address] [place] [address]  Appt Line: [phone]  Allergy RN:  [phone]       Important Scheduling Information  Aspirin Desensitization: Appt will last 2 clinic days. Please call the Allergy RN line for your clinic to schedule. Discontinue antihistamines 7 days prior to the appointment.     Food Challenges: Appt will last 3-4 hours. Please call the Allergy RN line for your clinic to schedule. Discontinue antihistamines 7 days prior to the appointment.     Penicillin Testing: Appt will last 2-3 hours. Please call the Allergy RN line for your clinic to schedule. Discontinue antihistamines 7 days prior to the appointment.     Skin Testing: Appt will about 40 minutes. Call the appointment line for your clinic to schedule. Discontinue antihistamines 7 days prior to the appointment.     Venom Testing: Appt will last 2-3 hours. Please call the Allergy RN line for your clinic to schedule. Discontinue antihistamines 7 days prior to the appointment.     Thank you for trusting us with your Allergy, Asthma, and Immunology care. Please feel free to contact us with any questions or concerns you may have.

## 2020-09-29 NOTE — NURSING NOTE
Cluster Allergen Immunotherapy:    After explaining risks and benefits, and obtaining verbal and written consent, we proceeded with cluster immunotherapy.      Injection given: 1406  Yellow 1:10   Trees    0.35 mL  Yellow 1:10   Dog, Grass, Dust Mite 0.35 mL  Yellow 1:10   Cat, Molds   0.35 mL  Yellow 1:10   Weeds    0.35 mL    Injection given: 1442  Yellow 1:10   Trees    0.5 mL  Yellow 1:10   Dog, Grass, Dust Mite 0.5 mL  Yellow 1:10   Cat, Molds   0.5 mL  Yellow 1:10   Weeds    0.5 mL    VITAL SIGNS    Time BP Pulse O2    1436 130/86 86 98   1515 126/82 86 100       Start Time: 1406  End Time: 1515

## 2020-09-29 NOTE — ASSESSMENT & PLAN NOTE
Perennial with spring and fall worsening nasal and ocular symptoms.  Current treatment is loratadine and Alaway.  Did not tolerate nasal steroid.     Skin testing with nonreactive histamine.  Cannot interpret negative results.  Oak was positive.  Blood testing positive for cat, dog, mold, trees, grass, weeds and dust mite.     Cluster immunotherapy   The patient received yellow 0.35, yellow 0.5. Each dose was  by 30 minutes. Full report will be scanned into electronic medical record. The patient was in office for over 1.0 hours.        -Singulair 10mg by mouth daily at night.   -Allegra-D daily  -Ketotifen 1 drop/eye twice daily as needed for allergy symptoms or Patanol (olapatdine) 1 drop/eye twice daily as needed.

## 2020-09-29 NOTE — PROGRESS NOTES
Sumaya Gatica is a 50 year old White female with previous medical history significant for allergic rhinitis who returns for a follow up visit.     Patient presents today for cluster immunotherapy. The patient is currently in a good state of health. No recent fevers, chills, cough, wheezing, shortness of breath, skin rash, angioedema, nausea, vomiting or diarrhea. The risks and benefits were discussed and the patient/patient's family wishes to proceed. The consent was signed.    Past Medical History:   Diagnosis Date     Abnormal Pap smear, can't excl hi gd sq intraepithelial lesion (ASC-H) 3/22/07    negative colposcopy     History of colposcopy with cervical biopsy 07     Ovarian cysts      Rosacea      Family History   Problem Relation Age of Onset     Alcohol/Drug Paternal Grandfather         alcohlism     Other Cancer Paternal Grandfather      Cancer Maternal Grandfather         gallbladder     Diabetes Maternal Grandmother              Cerebrovascular Disease Paternal Grandmother      Alzheimer Disease Paternal Grandmother         and dementia     Prostate Cancer Brother      Diabetes Mother      Alzheimer Disease Mother      Cancer Brother         skin     Past Surgical History:   Procedure Laterality Date     COLONOSCOPY  2012    Procedure: COLONOSCOPY;  Colonscopy Screening, Diverticulitis;  Surgeon: Reilly Holt MD;  Location:  GI     DILATION AND CURETTAGE, HYSTEROSCOPY, ABLATE ENDOMETRIUM NOVASURE, COMBINED  2011    Procedure:COMBINED DILATION AND CURETTAGE, HYSTEROSCOPY, ABLATE ENDOMETRIUM NOVASURE; hysteroscopy, dialtion and curettage, novasure endometrial ablation  ; Surgeon:MILAD VILLARREAL; Location: OR     GYN SURGERY      [facility] REMOVAL OF TONSILS,<11 Y/O       HC REPAIR OF NASAL SEPTUM  08    Septoplasty, submucosal resection inferior turbinates.     HYSTERECTOMY, PAP NO LONGER INDICATED         REVIEW OF SYSTEMS:  General: negative for weight  gain. negative for weight loss. negative for changes in sleep.   Ears: negative for fullness. negative for hearing loss. negative for dizziness.   Nose: negative for snoring.negative for changes in smell. negative for drainage.   Eyes: negative for eye watering. negative for eye itching. negative for vision changes. negative for eye redness.  Throat: negative for hoarseness. negative for sore throat. negative for trouble swallowing.   Lungs: negative for shortness of breath.negative for wheezing. negative for sputum production.   Cardiovascular: negative for chest pain. negative for swelling of ankles. negative for fast or irregular heartbeat.   Gastrointestinal: negative for nausea. negative for heartburn. negative for acid reflux.   Musculoskeletal: negative for joint pain. negative for joint stiffness. negative for joint swelling.   Neurologic: negative for seizures. negative for fainting. negative for weakness.   Psychiatric: negative for changes in mood. negative for anxiety.   Endocrine: negative for cold intolerance. negative for heat intolerance. negative for tremors.   Lymphatic: negative for lower extremity swelling. negative for lymph node swelling.   Hematologic: negative for easy bruising. negative for easy bleeding.  Integumentary: negative for rash. negative for scaling. negative for nail changes.       Current Outpatient Medications:      calcium carbonate (OS-EDIS 500 MG [place]. [place]) 1250 MG tablet, Take 1 tablet by mouth daily, Disp: , Rfl:      EPINEPHrine (EPIPEN 2-ROMARIO) 0.3 MG/0.3ML injection 2-pack, Inject 0.3 mLs (0.3 mg) into the muscle as needed for anaphylaxis, Disp: 0.6 mL, Rfl: 1     fexofenadine-pseudoePHEDrine (ALLEGRA-D 24) 180-240 MG 24 hr tablet, Take 1 tablet by mouth daily, Disp: 30 tablet, Rfl: 11     ibuprofen (ADVIL,MOTRIN) 200 MG tablet, Take 400 mg by mouth every 4 hours as needed Reported on 2/27/2017, Disp: , Rfl:      loratadine (CLARITIN) 10 MG tablet, Take 10 mg by mouth  daily, Disp: , Rfl:      montelukast (SINGULAIR) 10 MG tablet, Take 1 tablet (10 mg) by mouth At Bedtime, Disp: 30 tablet, Rfl: 11     olopatadine (PATANOL) 0.1 % ophthalmic solution, Place 1 drop into both eyes 2 times daily, Disp: 1 Bottle, Rfl: 4     ORDER FOR ALLERGEN IMMUNOTHERAPY, Cat Hair, Standardized A.P. 10,000 BAU/mL, HS  2.0 ml  Alternaria Tenuis 1:10 w/v, HS  0.5 ml Aspergillus Fumigatus 1:10 w/v, HS  0.5 ml Epicoccum Nigrum 1:10 w/v, HS 0.5 ml Penicillium Notatum 1:10 w/v, HS  0.5 ml Diluent: HSA qs to 5ml, Disp: 5 mL, Rfl: prn     ORDER FOR ALLERGEN IMMUNOTHERAPY, Abdiaziz, White 1:20 w/v, HS  0.5 ml Birch Mix PRW 1:20 w/v, HS  0.5 ml Boxelder-Maple Mix BHR (Boxelder Hard Red) 1:20 w/v, HS  0.5 ml Cedar, Red 1:20 w/v, HS  0.5 ml [place], Common 1:20 w/v, HS  0.5 ml Elm, American 1:20 w/v, HS  0.5 ml [place] Mix RW 1:20 w/v, HS 0.5 ml Oak Mix RVW 1:20 w/v, HS 0.5 ml Pine, White 1:20 w/v, ALK 0.5 ml [place] Tree, Black 1:20 w/v, HS 0.5 ml Diluent: HSA qs to 5ml, Disp: 5 mL, Rfl: prn     ORDER FOR ALLERGEN IMMUNOTHERAPY, Dog Hair-Dander, A. P.  1:100 w/v, HS  1.0 ml Dust Mites DF. 10,000 AU/mL, HS  0.5 ml Dust Mites DP. 10,000 AU/mL, HS  0.5 ml  Richard Grass 1:20 w/v, HS 0.5 ml Bonilla Grass (Std) 100,000 BAU/mL, HS 0.4 ml Diluent: HSA qs to 5ml, Disp: 5 mL, Rfl: prn     ORDER FOR ALLERGEN IMMUNOTHERAPY, Kochia 1:20 w/v, HS 0.5 ml Lamb's Quarters 1:20 w/v, HS 0.5 ml Nettle 1:20 w/v, HS 0.5 ml Plantain, English 1:20 w/v, HS 0.5 ml Ragweed, Mix (Giant, Short) 1:20 w/v, HS 0.5 ml Russian Thistle 1:20 w/v, HS 0.5 ml Sagebrush, Mugwort 1:20 w/v, HS 0.5 ml Sorrel, Sheep 1:20 w/v, HS 0.5 ml Diluent: HSA qs to 5ml, Disp: 5 mL, Rfl: prn     SUMAtriptan (IMITREX) 50 MG tablet, Take 1 tablet (50 mg) by mouth See Admin Instructions For ONSET OF MIGRAINE, MAY REPEAT ONCE AFTER 2 HRS. DO NOT EXCEED 4 TABLETS IN 24 HRS., Disp: 12 tablet, Rfl: 1     triamcinolone (NASACORT) 55 MCG/ACT nasal aerosol, Spray 2 sprays into  both nostrils daily, Disp: 1 Bottle, Rfl: 11     UNABLE TO FIND, Apply topically daily MEDICATION NAME: testosterone cream, Disp: , Rfl:      UNABLE TO FIND, Place 1 drop into both eyes daily MEDICATION NAME: Alaway 0.035%, Disp: , Rfl:      VITAMIN D, CHOLECALCIFEROL, PO, Take 10,000 Units by mouth daily, Disp: , Rfl:   Immunization History   Administered Date(s) Administered     TD (ADULT, 7+) 11/02/2000     TDAP Vaccine (Adacel) 07/23/2010     Allergies   Allergen Reactions     No Known Drug Allergies      Peanuts [Nuts] Hives     Seasonal Allergies      Shellfish-Derived Products Itching         EXAM:   Constitutional:  Appears well-developed and well-nourished. No distress.   HEENT:   Head: Normocephalic.   Nasal tissue pink and normal appearing.  No rhinorrhea noted.    Eyes: Conjunctivae are non-erythematous   Cardiovascular: Normal rate, regular rhythm and normal heart sounds. Exam reveals no gallop and no friction rub.   No murmur heard.  Respiratory: Effort normal and breath sounds normal. No respiratory distress. No wheezes. No rales.   Musculoskeletal: Normal range of motion.   Neuro: Oriented to person, place, and time.  Skin: Skin is warm and dry. No rash noted.   Psychiatric: Normal mood and affect.     Nursing note and vitals reviewed.    ASSESSMENT/PLAN:  Problem List Items Addressed This Visit        Respiratory    Chronic seasonal allergic rhinitis due to pollen - Primary     Perennial with spring and fall worsening nasal and ocular symptoms.  Current treatment is loratadine and Alaway.  Did not tolerate nasal steroid.     Skin testing with nonreactive histamine.  Cannot interpret negative results.  Oak was positive.  Blood testing positive for cat, dog, mold, trees, grass, weeds and dust mite.     Cluster immunotherapy   The patient received yellow 0.35, yellow 0.5. Each dose was  by 30 minutes. Full report will be scanned into electronic medical record. The patient was in office for  over 1.0 hours.        -Singulair 10mg by mouth daily at night.   -Allegra-D daily  -Ketotifen 1 drop/eye twice daily as needed for allergy symptoms or Patanol (olapatdine) 1 drop/eye twice daily as needed.         Relevant Orders    RAPID DESENSITIZATION (Completed)    Allergic rhinitis due to mold    Relevant Orders    RAPID DESENSITIZATION (Completed)    Allergic rhinitis due to dust mite    Relevant Orders    RAPID DESENSITIZATION (Completed)    Allergic rhinitis due to animal dander    Relevant Orders    RAPID DESENSITIZATION (Completed)          Chart documentation with Dragon Voice recognition Software. Although reviewed after completion, some words and grammatical errors may remain.    Jesús Lowry DO FAAAAI  Medical Director for Allergy/Immunology at [place], [place]

## 2020-09-29 NOTE — LETTER
2020         RE: Sumaya Gatica  [address] [address]  [place] [address]        Dear Colleague,    Thank you for referring your patient, Sumaya Gatica, to the [facility]. Please see a copy of my visit note below.    Sumaya Gatica is a 50 year old White female with previous medical history significant for allergic rhinitis who returns for a follow up visit.     Patient presents today for cluster immunotherapy. The patient is currently in a good state of health. No recent fevers, chills, cough, wheezing, shortness of breath, skin rash, angioedema, nausea, vomiting or diarrhea. The risks and benefits were discussed and the patient/patient's family wishes to proceed. The consent was signed.    Past Medical History:   Diagnosis Date     Abnormal Pap smear, can't excl hi gd sq intraepithelial lesion (ASC-H) 3/22/07    negative colposcopy     History of colposcopy with cervical biopsy 07     Ovarian cysts      Rosacea      Family History   Problem Relation Age of Onset     Alcohol/Drug Paternal Grandfather         alcohlism     Other Cancer Paternal Grandfather      Cancer Maternal Grandfather         gallbladder     Diabetes Maternal Grandmother              Cerebrovascular Disease Paternal Grandmother      Alzheimer Disease Paternal Grandmother         and dementia     Prostate Cancer Brother      Diabetes Mother      Alzheimer Disease Mother      Cancer Brother         skin     Past Surgical History:   Procedure Laterality Date     COLONOSCOPY  2012    Procedure: COLONOSCOPY;  Colonscopy Screening, Diverticulitis;  Surgeon: Reilly Holt MD;  Location:  GI     DILATION AND CURETTAGE, HYSTEROSCOPY, ABLATE ENDOMETRIUM NOVASURE, COMBINED  2011    Procedure:COMBINED DILATION AND CURETTAGE, HYSTEROSCOPY, ABLATE ENDOMETRIUM NOVASURE; hysteroscopy, dialtion and curettage, novasure endometrial ablation  ; Surgeon:MILAD VILLARREAL; Location: OR      GYN SURGERY  2015    [facility]      HC REMOVAL OF TONSILS,<13 Y/O       HC REPAIR OF NASAL SEPTUM  12/30/08    Septoplasty, submucosal resection inferior turbinates.     HYSTERECTOMY, PAP NO LONGER INDICATED         REVIEW OF SYSTEMS:  General: negative for weight gain. negative for weight loss. negative for changes in sleep.   Ears: negative for fullness. negative for hearing loss. negative for dizziness.   Nose: negative for snoring.negative for changes in smell. negative for drainage.   Eyes: negative for eye watering. negative for eye itching. negative for vision changes. negative for eye redness.  Throat: negative for hoarseness. negative for sore throat. negative for trouble swallowing.   Lungs: negative for shortness of breath.negative for wheezing. negative for sputum production.   Cardiovascular: negative for chest pain. negative for swelling of ankles. negative for fast or irregular heartbeat.   Gastrointestinal: negative for nausea. negative for heartburn. negative for acid reflux.   Musculoskeletal: negative for joint pain. negative for joint stiffness. negative for joint swelling.   Neurologic: negative for seizures. negative for fainting. negative for weakness.   Psychiatric: negative for changes in mood. negative for anxiety.   Endocrine: negative for cold intolerance. negative for heat intolerance. negative for tremors.   Lymphatic: negative for lower extremity swelling. negative for lymph node swelling.   Hematologic: negative for easy bruising. negative for easy bleeding.  Integumentary: negative for rash. negative for scaling. negative for nail changes.       Current Outpatient Medications:      calcium carbonate (OS-EDIS 500 MG [employer] [place]. [place]) 1250 MG tablet, Take 1 tablet by mouth daily, Disp: , Rfl:      EPINEPHrine (EPIPEN 2-ROMARIO) 0.3 MG/0.3ML injection 2-pack, Inject 0.3 mLs (0.3 mg) into the muscle as needed for anaphylaxis, Disp: 0.6 mL, Rfl: 1     fexofenadine-pseudoePHEDrine (ALLEGRA-D 24) 180-240 MG  24 hr tablet, Take 1 tablet by mouth daily, Disp: 30 tablet, Rfl: 11     ibuprofen (ADVIL,MOTRIN) 200 MG tablet, Take 400 mg by mouth every 4 hours as needed Reported on 2/27/2017, Disp: , Rfl:      loratadine (CLARITIN) 10 MG tablet, Take 10 mg by mouth daily, Disp: , Rfl:      montelukast (SINGULAIR) 10 MG tablet, Take 1 tablet (10 mg) by mouth At Bedtime, Disp: 30 tablet, Rfl: 11     olopatadine (PATANOL) 0.1 % ophthalmic solution, Place 1 drop into both eyes 2 times daily, Disp: 1 Bottle, Rfl: 4     ORDER FOR ALLERGEN IMMUNOTHERAPY, Cat Hair, Standardized A.P. 10,000 BAU/mL, HS  2.0 ml  Alternaria Tenuis 1:10 w/v, HS  0.5 ml Aspergillus Fumigatus 1:10 w/v, HS  0.5 ml Epicoccum Nigrum 1:10 w/v, HS 0.5 ml Penicillium Notatum 1:10 w/v, HS  0.5 ml Diluent: HSA qs to 5ml, Disp: 5 mL, Rfl: prn     ORDER FOR ALLERGEN IMMUNOTHERAPY, Abdiaziz, White 1:20 w/v, HS  0.5 ml Birch Mix PRW 1:20 w/v, HS  0.5 ml Boxelder-Maple Mix BHR (Boxelder Hard Red) 1:20 w/v, HS  0.5 ml Cedar, Red 1:20 w/v, HS  0.5 ml [place], Common 1:20 w/v, HS  0.5 ml Elm, American 1:20 w/v, HS  0.5 ml [place] Mix RW 1:20 w/v, HS 0.5 ml Oak Mix RVW 1:20 w/v, HS 0.5 ml Pine, White 1:20 w/v, ALK 0.5 ml [place] Tree, Black 1:20 w/v, HS 0.5 ml Diluent: HSA qs to 5ml, Disp: 5 mL, Rfl: prn     ORDER FOR ALLERGEN IMMUNOTHERAPY, Dog Hair-Dander, A. P.  1:100 w/v, HS  1.0 ml Dust Mites DF. 10,000 AU/mL, HS  0.5 ml Dust Mites DP. 10,000 AU/mL, HS  0.5 ml  Richard Grass 1:20 w/v, HS 0.5 ml Bonilla Grass (Std) 100,000 BAU/mL, HS 0.4 ml Diluent: HSA qs to 5ml, Disp: 5 mL, Rfl: prn     ORDER FOR ALLERGEN IMMUNOTHERAPY, Kochia 1:20 w/v, HS 0.5 ml Lamb's Quarters 1:20 w/v, HS 0.5 ml Nettle 1:20 w/v, HS 0.5 ml Plantain, English 1:20 w/v, HS 0.5 ml Ragweed, Mix (Giant, Short) 1:20 w/v, HS 0.5 ml Russian Thistle 1:20 w/v, HS 0.5 ml Sagebrush, Mugwort 1:20 w/v, HS 0.5 ml Sorrel, Sheep 1:20 w/v, HS 0.5 ml Diluent: HSA qs to 5ml, Disp: 5 mL, Rfl: prn     SUMAtriptan (IMITREX)  50 MG tablet, Take 1 tablet (50 mg) by mouth See Admin Instructions For ONSET OF MIGRAINE, MAY REPEAT ONCE AFTER 2 HRS. DO NOT EXCEED 4 TABLETS IN 24 HRS., Disp: 12 tablet, Rfl: 1     triamcinolone (NASACORT) 55 MCG/ACT nasal aerosol, Spray 2 sprays into both nostrils daily, Disp: 1 Bottle, Rfl: 11     UNABLE TO FIND, Apply topically daily MEDICATION NAME: testosterone cream, Disp: , Rfl:      UNABLE TO FIND, Place 1 drop into both eyes daily MEDICATION NAME: Alaway 0.035%, Disp: , Rfl:      VITAMIN D, CHOLECALCIFEROL, PO, Take 10,000 Units by mouth daily, Disp: , Rfl:   Immunization History   Administered Date(s) Administered     TD (ADULT, 7+) 11/02/2000     TDAP Vaccine (Adacel) 07/23/2010     Allergies   Allergen Reactions     No Known Drug Allergies      Peanuts [Nuts] Hives     Seasonal Allergies      Shellfish-Derived Products Itching         EXAM:   Constitutional:  Appears well-developed and well-nourished. No distress.   HEENT:   Head: Normocephalic.   Nasal tissue pink and normal appearing.  No rhinorrhea noted.    Eyes: Conjunctivae are non-erythematous   Cardiovascular: Normal rate, regular rhythm and normal heart sounds. Exam reveals no gallop and no friction rub.   No murmur heard.  Respiratory: Effort normal and breath sounds normal. No respiratory distress. No wheezes. No rales.   Musculoskeletal: Normal range of motion.   Neuro: Oriented to person, place, and time.  Skin: Skin is warm and dry. No rash noted.   Psychiatric: Normal mood and affect.     Nursing note and vitals reviewed.    ASSESSMENT/PLAN:  Problem List Items Addressed This Visit        Respiratory    Chronic seasonal allergic rhinitis due to pollen - Primary     Perennial with spring and fall worsening nasal and ocular symptoms.  Current treatment is loratadine and Alaway.  Did not tolerate nasal steroid.     Skin testing with nonreactive histamine.  Cannot interpret negative results.  Oak was positive.  Blood testing positive for  cat, dog, mold, trees, grass, weeds and dust mite.     Cluster immunotherapy   The patient received yellow 0.35, yellow 0.5. Each dose was  by 30 minutes. Full report will be scanned into electronic medical record. The patient was in office for over 1.0 hours.        -Singulair 10mg by mouth daily at night.   -Allegra-D daily  -Ketotifen 1 drop/eye twice daily as needed for allergy symptoms or Patanol (olapatdine) 1 drop/eye twice daily as needed.         Relevant Orders    RAPID DESENSITIZATION (Completed)    Allergic rhinitis due to mold    Relevant Orders    RAPID DESENSITIZATION (Completed)    Allergic rhinitis due to dust mite    Relevant Orders    RAPID DESENSITIZATION (Completed)    Allergic rhinitis due to animal dander    Relevant Orders    RAPID DESENSITIZATION (Completed)          Chart documentation with Dragon Voice recognition Software. Although reviewed after completion, some words and grammatical errors may remain.    Jesús Lowry DO FAAAAI  Medical Director for Allergy/Immunology at [place], [place]      Again, thank you for allowing me to participate in the care of your patient.        Sincerely,        Jesús Lowry DO

## 2020-10-06 ENCOUNTER — OFFICE VISIT (OUTPATIENT)
Dept: ALLERGY | Facility: OTHER | Age: 50
End: 2020-10-06
Payer: COMMERCIAL

## 2020-10-06 VITALS
HEART RATE: 98 BPM | WEIGHT: 165 LBS | SYSTOLIC BLOOD PRESSURE: 118 MMHG | DIASTOLIC BLOOD PRESSURE: 70 MMHG | TEMPERATURE: 98.5 F | OXYGEN SATURATION: 100 % | HEIGHT: 67 IN | BODY MASS INDEX: 25.9 KG/M2

## 2020-10-06 DIAGNOSIS — J30.1 CHRONIC SEASONAL ALLERGIC RHINITIS DUE TO POLLEN: ICD-10-CM

## 2020-10-06 DIAGNOSIS — J30.81 ALLERGIC RHINITIS DUE TO ANIMAL DANDER: ICD-10-CM

## 2020-10-06 DIAGNOSIS — J30.89 ALLERGIC RHINITIS DUE TO DUST MITE: ICD-10-CM

## 2020-10-06 DIAGNOSIS — J30.89 ALLERGIC RHINITIS DUE TO MOLD: Primary | ICD-10-CM

## 2020-10-06 PROCEDURE — 95180 RAPID DESENSITIZATION: CPT | Performed by: ALLERGY & IMMUNOLOGY

## 2020-10-06 PROCEDURE — 99207 PR NO CHARGE LOS: CPT | Performed by: ALLERGY & IMMUNOLOGY

## 2020-10-06 ASSESSMENT — MIFFLIN-ST. JEOR: SCORE: 1401.07

## 2020-10-06 ASSESSMENT — PAIN SCALES - GENERAL: PAINLEVEL: NO PAIN (0)

## 2020-10-06 NOTE — PROGRESS NOTES
Prior to initiation of cluster immunotherapy RN ensured that patient was feeling healthy, has premedicated with Zyrtec or Allegra yesterday twice daily and this morning. RN also ensured that patient has unexpired Epi-Pen, had no new medication changes, and did not have a reaction after the last allergy shots were given. If the patient has asthma it is well-controlled. The patient has not been ill in the past 7 days. Patient was given allergy injections per cluster immunotherapy protocol 30 minutes apart and vital signs were monitored. Patient was monitored in clinic for 30 minutes after last injection was given and assessed by provider before discharging.     Pallavi Carrillo RN    Cluster Allergen Immunotherapy:    After explaining risks and benefits, and obtaining verbal and written consent, we proceeded with cluster immunotherapy.     VISIT  VIAL COLOR/STRENGTH  DOSES TO BE GIVEN    1  GREEN (1:1000), BLUE (1:100)  GREEN 0.1, GREEN 0.4, BLUE 0.1    2  BLUE (1:100), YELLOW (1:10)  BLUE 0.2, BLUE 0.4, YELLOW 0.05    3  YELLOW (1:10)  YELLOW 0.1, YELLOW 0.15, YELLOW 0.25    4  YELLOW (1:10)  YELLOW 0.35, YELLOW 0.5    5  RED (1:1)  RED 0.05, RED 0.1    6  RED (1:1)  RED 0.15, RED 0.2    7  RED (1:1)  RED 0.3, RED 0.4    8  RED (1:1)  RED 0.5        VISIT 5    Time Injection Given: 1415  Red 1:1   Trees    0.05 mL  Red 1:1   Dog, Grass, Dust Mite 0.05 mL  Red 1:1   Cat, Molds   0.05 mL  Red 1:1   Weeds    0.05 mL    Time Injection Given: 1451  Red 1:1   Trees    0.1 mL  Red 1:1   Dog, Grass, Dust Mite 0.1 mL  Red 1:1   Cat, Molds   0.1 mL  Red 1:1   Weeds    0.1 mL      Start Time: 1415  End Time: 1523      VITALS   Time BP Pulse pOx Reaction Treatment   1446 124/82 94 100 - -   1523 126/84 89 100 - -

## 2020-10-06 NOTE — PATIENT INSTRUCTIONS
Allergy Staff Appt Hours Shot Hours Locations    Physician     Jesús Lowry DO       Support Staff     SKYLAR Hernadez, GIACOMO  Tuesday:        [place] 7-4:20     Wednesday:        [place]: 7-5     Thursday:                    [place] 7-6:40     Friday:  [place]  7-2:40   [place]        Thursday: 1-5:50        Friday: 7-10:50     [place]        Tuesday: 7- 3:20        Wednesday: 7-4:20     [place] Monday: 7-4:20        Tuesday: 1-6:20         [facility]  [address] [place] [address]  Appt Line: [phone]  Allergy RN:  [phone]    [facility]  [address] [place] [address]  Appt Line: [phone]  Allergy RN:  [phone]       Important Scheduling Information  Aspirin Desensitization: Appt will last 2 clinic days. Please call the Allergy RN line for your clinic to schedule. Discontinue antihistamines 7 days prior to the appointment.     Food Challenges: Appt will last 3-4 hours. Please call the Allergy RN line for your clinic to schedule. Discontinue antihistamines 7 days prior to the appointment.     Penicillin Testing: Appt will last 2-3 hours. Please call the Allergy RN line for your clinic to schedule. Discontinue antihistamines 7 days prior to the appointment.     Skin Testing: Appt will about 40 minutes. Call the appointment line for your clinic to schedule. Discontinue antihistamines 7 days prior to the appointment.     Venom Testing: Appt will last 2-3 hours. Please call the Allergy RN line for your clinic to schedule. Discontinue antihistamines 7 days prior to the appointment.     Thank you for trusting us with your Allergy, Asthma, and Immunology care. Please feel free to contact us with any questions or concerns you may have.

## 2020-10-06 NOTE — LETTER
10/6/2020         RE: Sumaya Gatica  [address]  [place] [address]        Dear Colleague,    Thank you for referring your patient, Sumaya Gatica, to the [facility]. Please see a copy of my visit note below.    Sumaya Gatica is a 50 year old White female with previous medical history significant for allergic rhinitis who returns for a follow up visit.     Patient presents today for cluster immunotherapy. The patient is currently in a good state of health. No recent fevers, chills, cough, wheezing, shortness of breath, skin rash, angioedema, nausea, vomiting or diarrhea. The risks and benefits were discussed and the patient/patient's family wishes to proceed. The consent was signed.      Past Medical History:   Diagnosis Date     Abnormal Pap smear, can't excl hi gd sq intraepithelial lesion (ASC-H) 3/22/07    negative colposcopy     History of colposcopy with cervical biopsy 07     Ovarian cysts      Rosacea      Family History   Problem Relation Age of Onset     Alcohol/Drug Paternal Grandfather         alcohlism     Other Cancer Paternal Grandfather      Cancer Maternal Grandfather         gallbladder     Diabetes Maternal Grandmother              Cerebrovascular Disease Paternal Grandmother      Alzheimer Disease Paternal Grandmother         and dementia     Prostate Cancer Brother      Diabetes Mother      Alzheimer Disease Mother      Cancer Brother         skin     Past Surgical History:   Procedure Laterality Date     COLONOSCOPY  2012    Procedure: COLONOSCOPY;  Colonscopy Screening, Diverticulitis;  Surgeon: Reilly Holt MD;  Location:  GI     DILATION AND CURETTAGE, HYSTEROSCOPY, ABLATE ENDOMETRIUM NOVASURE, COMBINED  2011    Procedure:COMBINED DILATION AND CURETTAGE, HYSTEROSCOPY, ABLATE ENDOMETRIUM NOVASURE; hysteroscopy, dialtion and curettage, novasure endometrial ablation  ; Surgeon:MILAD VILLARREAL; Location:  OR     GYN SURGERY  2015    [facility]      HC REMOVAL OF TONSILS,<11 Y/O       HC REPAIR OF NASAL SEPTUM  12/30/08    Septoplasty, submucosal resection inferior turbinates.     HYSTERECTOMY, PAP NO LONGER INDICATED         REVIEW OF SYSTEMS:  General: negative for weight gain. negative for weight loss. negative for changes in sleep.   Ears: negative for fullness. negative for hearing loss. negative for dizziness.   Nose: negative for snoring.negative for changes in smell. negative for drainage.   Eyes: negative for eye watering. negative for eye itching. negative for vision changes. negative for eye redness.  Throat: negative for hoarseness. negative for sore throat. negative for trouble swallowing.   Lungs: negative for shortness of breath.negative for wheezing. negative for sputum production.   Cardiovascular: negative for chest pain. negative for swelling of ankles. negative for fast or irregular heartbeat.   Gastrointestinal: negative for nausea. negative for heartburn. negative for acid reflux.   Musculoskeletal: negative for joint pain. negative for joint stiffness. negative for joint swelling.   Neurologic: negative for seizures. negative for fainting. negative for weakness.   Psychiatric: negative for changes in mood. negative for anxiety.   Endocrine: negative for cold intolerance. negative for heat intolerance. negative for tremors.   Lymphatic: negative for lower extremity swelling. negative for lymph node swelling.   Hematologic: negative for easy bruising. negative for easy bleeding.  Integumentary: negative for rash. negative for scaling. negative for nail changes.       Current Outpatient Medications:      calcium carbonate (OS-EDIS 500 MG [place]. [place]) 1250 MG tablet, Take 1 tablet by mouth daily, Disp: , Rfl:      EPINEPHrine (EPIPEN 2-ROMARIO) 0.3 MG/0.3ML injection 2-pack, Inject 0.3 mLs (0.3 mg) into the muscle as needed for anaphylaxis, Disp: 0.6 mL, Rfl: 1     fexofenadine-pseudoePHEDrine (ALLEGRA-D 24)  180-240 MG 24 hr tablet, Take 1 tablet by mouth daily, Disp: 30 tablet, Rfl: 11     ibuprofen (ADVIL,MOTRIN) 200 MG tablet, Take 400 mg by mouth every 4 hours as needed Reported on 2/27/2017, Disp: , Rfl:      loratadine (CLARITIN) 10 MG tablet, Take 10 mg by mouth daily, Disp: , Rfl:      montelukast (SINGULAIR) 10 MG tablet, Take 1 tablet (10 mg) by mouth At Bedtime, Disp: 30 tablet, Rfl: 11     olopatadine (PATANOL) 0.1 % ophthalmic solution, Place 1 drop into both eyes 2 times daily, Disp: 1 Bottle, Rfl: 4     ORDER FOR ALLERGEN IMMUNOTHERAPY, Cat Hair, Standardized A.P. 10,000 BAU/mL, HS  2.0 ml  Alternaria Tenuis 1:10 w/v, HS  0.5 ml Aspergillus Fumigatus 1:10 w/v, HS  0.5 ml Epicoccum Nigrum 1:10 w/v, HS 0.5 ml Penicillium Notatum 1:10 w/v, HS  0.5 ml Diluent: HSA qs to 5ml, Disp: 5 mL, Rfl: prn     ORDER FOR ALLERGEN IMMUNOTHERAPY, Abdiaziz, White 1:20 w/v, HS  0.5 ml Birch Mix PRW 1:20 w/v, HS  0.5 ml Boxelder-Maple Mix BHR (Boxelder Hard Red) 1:20 w/v, HS  0.5 ml Cedar, Red 1:20 w/v, HS  0.5 ml [place], Common 1:20 w/v, HS  0.5 ml Elm, American 1:20 w/v, HS  0.5 ml [place] Mix RW 1:20 w/v, HS 0.5 ml Oak Mix RVW 1:20 w/v, HS 0.5 ml Pine, White 1:20 w/v, ALK 0.5 ml [place] Tree, Black 1:20 w/v, HS 0.5 ml Diluent: HSA qs to 5ml, Disp: 5 mL, Rfl: prn     ORDER FOR ALLERGEN IMMUNOTHERAPY, Dog Hair-Dander, A. P.  1:100 w/v, HS  1.0 ml Dust Mites DF. 10,000 AU/mL, HS  0.5 ml Dust Mites DP. 10,000 AU/mL, HS  0.5 ml  Richard Grass 1:20 w/v, HS 0.5 ml Bonilla Grass (Std) 100,000 BAU/mL, HS 0.4 ml Diluent: HSA qs to 5ml, Disp: 5 mL, Rfl: prn     ORDER FOR ALLERGEN IMMUNOTHERAPY, Kochia 1:20 w/v, HS 0.5 ml Lamb's Quarters 1:20 w/v, HS 0.5 ml Nettle 1:20 w/v, HS 0.5 ml Plantain, English 1:20 w/v, HS 0.5 ml Ragweed, Mix (Giant, Short) 1:20 w/v, HS 0.5 ml Russian Thistle 1:20 w/v, HS 0.5 ml Sagebrush, Mugwort 1:20 w/v, HS 0.5 ml Sorrel, Sheep 1:20 w/v, HS 0.5 ml Diluent: HSA qs to 5ml, Disp: 5 mL, Rfl: prn     SUMAtriptan  (IMITREX) 50 MG tablet, Take 1 tablet (50 mg) by mouth See Admin Instructions For ONSET OF MIGRAINE, MAY REPEAT ONCE AFTER 2 HRS. DO NOT EXCEED 4 TABLETS IN 24 HRS., Disp: 12 tablet, Rfl: 1     triamcinolone (NASACORT) 55 MCG/ACT nasal aerosol, Spray 2 sprays into both nostrils daily, Disp: 1 Bottle, Rfl: 11     UNABLE TO FIND, Apply topically daily MEDICATION NAME: testosterone cream, Disp: , Rfl:      UNABLE TO FIND, Place 1 drop into both eyes daily MEDICATION NAME: Alaway 0.035%, Disp: , Rfl:      VITAMIN D, CHOLECALCIFEROL, PO, Take 10,000 Units by mouth daily, Disp: , Rfl:   Immunization History   Administered Date(s) Administered     TD (ADULT, 7+) 11/02/2000     TDAP Vaccine (Adacel) 07/23/2010     Allergies   Allergen Reactions     No Known Drug Allergies      Peanuts [Nuts] Hives     Seasonal Allergies      Shellfish-Derived Products Itching         EXAM:   Constitutional:  Appears well-developed and well-nourished. No distress.   HEENT:   Head: Normocephalic.   Cardiovascular: Normal rate, regular rhythm and normal heart sounds. Exam reveals no gallop and no friction rub.   No murmur heard.  Respiratory: Effort normal and breath sounds normal. No respiratory distress. No wheezes. No rales.   Neuro: Oriented to person, place, and time.  Skin: Skin is warm and dry. No rash noted.   Psychiatric: Normal mood and affect.     Nursing note and vitals reviewed.      ASSESSMENT/PLAN:  Problem List Items Addressed This Visit        Respiratory    Chronic seasonal allergic rhinitis due to pollen     Perennial with spring and fall worsening nasal and ocular symptoms.  Current treatment is loratadine and Alaway.  Did not tolerate nasal steroid.     Skin testing with nonreactive histamine.  Cannot interpret negative results.  Oak was positive.  Blood testing positive for cat, dog, mold, trees, grass, weeds and dust mite.     Cluster immunotherapy   The patient received red 0.05, red 0.10. Each dose was  by 30  minutes. Full report will be scanned into electronic medical record. The patient was in office for over 1.0 hours.           -Singulair 10mg by mouth daily at night.   -Allegra-D daily  -Ketotifen 1 drop/eye twice daily as needed for allergy symptoms or Patanol (olapatdine) 1 drop/eye twice daily as needed.         Relevant Orders    RAPID DESENSITIZATION (Completed)    Allergic rhinitis due to mold - Primary    Relevant Orders    RAPID DESENSITIZATION (Completed)    Allergic rhinitis due to dust mite    Relevant Orders    RAPID DESENSITIZATION (Completed)    Allergic rhinitis due to animal dander    Relevant Orders    RAPID DESENSITIZATION (Completed)          Chart documentation with Dragon Voice recognition Software. Although reviewed after completion, some words and grammatical errors may remain.    Jesús Lowry DO [facility]  Medical Director for Allergy/Immunology at [place], [place]      Prior to initiation of cluster immunotherapy RN ensured that patient was feeling healthy, has premedicated with Zyrtec or Allegra yesterday twice daily and this morning. RN also ensured that patient has unexpired Epi-Pen, had no new medication changes, and did not have a reaction after the last allergy shots were given. If the patient has asthma it is well-controlled. The patient has not been ill in the past 7 days. Patient was given allergy injections per cluster immunotherapy protocol 30 minutes apart and vital signs were monitored. Patient was monitored in clinic for 30 minutes after last injection was given and assessed by provider before discharging.     Pallavi Carrillo RN    Cluster Allergen Immunotherapy:    After explaining risks and benefits, and obtaining verbal and written consent, we proceeded with cluster immunotherapy.     VISIT  VIAL COLOR/STRENGTH  DOSES TO BE GIVEN    1  GREEN (1:1000), BLUE (1:100)  GREEN 0.1, GREEN 0.4, BLUE 0.1    2  BLUE (1:100), YELLOW (1:10)   BLUE 0.2, BLUE 0.4, YELLOW 0.05    3  YELLOW (1:10)  YELLOW 0.1, YELLOW 0.15, YELLOW 0.25    4  YELLOW (1:10)  YELLOW 0.35, YELLOW 0.5    5  RED (1:1)  RED 0.05, RED 0.1    6  RED (1:1)  RED 0.15, RED 0.2    7  RED (1:1)  RED 0.3, RED 0.4    8  RED (1:1)  RED 0.5        VISIT 5    Time Injection Given: 1415  Red 1:1   Trees    0.05 mL  Red 1:1   Dog, Grass, Dust Mite 0.05 mL  Red 1:1   Cat, Molds   0.05 mL  Red 1:1   Weeds    0.05 mL    Time Injection Given: 1451  Red 1:1   Trees    0.1 mL  Red 1:1   Dog, Grass, Dust Mite 0.1 mL  Red 1:1   Cat, Molds   0.1 mL  Red 1:1   Weeds    0.1 mL      Start Time: 1415  End Time: 1523      VITALS   Time BP Pulse pOx Reaction Treatment   1446 124/82 94 100 - -   1523 126/84 89 100 - -           Again, thank you for allowing me to participate in the care of your patient.        Sincerely,        Jesús Lowry, DO

## 2020-10-06 NOTE — ASSESSMENT & PLAN NOTE
Perennial with spring and fall worsening nasal and ocular symptoms.  Current treatment is loratadine and Alaway.  Did not tolerate nasal steroid.     Skin testing with nonreactive histamine.  Cannot interpret negative results.  Oak was positive.  Blood testing positive for cat, dog, mold, trees, grass, weeds and dust mite.     Cluster immunotherapy   The patient received red 0.05, red 0.10. Each dose was  by 30 minutes. Full report will be scanned into electronic medical record. The patient was in office for over 1.0 hours.           -Singulair 10mg by mouth daily at night.   -Allegra-D daily  -Ketotifen 1 drop/eye twice daily as needed for allergy symptoms or Patanol (olapatdine) 1 drop/eye twice daily as needed.

## 2020-10-06 NOTE — PROGRESS NOTES
Sumaya Gatica is a 50 year old White female with previous medical history significant for allergic rhinitis who returns for a follow up visit.     Patient presents today for cluster immunotherapy. The patient is currently in a good state of health. No recent fevers, chills, cough, wheezing, shortness of breath, skin rash, angioedema, nausea, vomiting or diarrhea. The risks and benefits were discussed and the patient/patient's family wishes to proceed. The consent was signed.      Past Medical History:   Diagnosis Date     Abnormal Pap smear, can't excl hi gd sq intraepithelial lesion (ASC-H) 3/22/07    negative colposcopy     History of colposcopy with cervical biopsy 07     Ovarian cysts      Rosacea      Family History   Problem Relation Age of Onset     Alcohol/Drug Paternal Grandfather         alcohlism     Other Cancer Paternal Grandfather      Cancer Maternal Grandfather         gallbladder     Diabetes Maternal Grandmother              Cerebrovascular Disease Paternal Grandmother      Alzheimer Disease Paternal Grandmother         and dementia     Prostate Cancer Brother      Diabetes Mother      Alzheimer Disease Mother      Cancer Brother         skin     Past Surgical History:   Procedure Laterality Date     COLONOSCOPY  2012    Procedure: COLONOSCOPY;  Colonscopy Screening, Diverticulitis;  Surgeon: Reilly Holt MD;  Location:  GI     DILATION AND CURETTAGE, HYSTEROSCOPY, ABLATE ENDOMETRIUM NOVASURE, COMBINED  2011    Procedure:COMBINED DILATION AND CURETTAGE, HYSTEROSCOPY, ABLATE ENDOMETRIUM NOVASURE; hysteroscopy, dialtion and curettage, novasure endometrial ablation  ; Surgeon:MILAD VILLARREAL; Location: OR     GYN SURGERY      [facility] REMOVAL OF TONSILS,<13 Y/O       HC REPAIR OF NASAL SEPTUM  08    Septoplasty, submucosal resection inferior turbinates.     HYSTERECTOMY, PAP NO LONGER INDICATED         REVIEW OF SYSTEMS:  General: negative for  weight gain. negative for weight loss. negative for changes in sleep.   Ears: negative for fullness. negative for hearing loss. negative for dizziness.   Nose: negative for snoring.negative for changes in smell. negative for drainage.   Eyes: negative for eye watering. negative for eye itching. negative for vision changes. negative for eye redness.  Throat: negative for hoarseness. negative for sore throat. negative for trouble swallowing.   Lungs: negative for shortness of breath.negative for wheezing. negative for sputum production.   Cardiovascular: negative for chest pain. negative for swelling of ankles. negative for fast or irregular heartbeat.   Gastrointestinal: negative for nausea. negative for heartburn. negative for acid reflux.   Musculoskeletal: negative for joint pain. negative for joint stiffness. negative for joint swelling.   Neurologic: negative for seizures. negative for fainting. negative for weakness.   Psychiatric: negative for changes in mood. negative for anxiety.   Endocrine: negative for cold intolerance. negative for heat intolerance. negative for tremors.   Lymphatic: negative for lower extremity swelling. negative for lymph node swelling.   Hematologic: negative for easy bruising. negative for easy bleeding.  Integumentary: negative for rash. negative for scaling. negative for nail changes.       Current Outpatient Medications:      calcium carbonate (OS-EDIS 500 MG [place]. [place]) 1250 MG tablet, Take 1 tablet by mouth daily, Disp: , Rfl:      EPINEPHrine (EPIPEN 2-ROMARIO) 0.3 MG/0.3ML injection 2-pack, Inject 0.3 mLs (0.3 mg) into the muscle as needed for anaphylaxis, Disp: 0.6 mL, Rfl: 1     fexofenadine-pseudoePHEDrine (ALLEGRA-D 24) 180-240 MG 24 hr tablet, Take 1 tablet by mouth daily, Disp: 30 tablet, Rfl: 11     ibuprofen (ADVIL,MOTRIN) 200 MG tablet, Take 400 mg by mouth every 4 hours as needed Reported on 2/27/2017, Disp: , Rfl:      loratadine (CLARITIN) 10 MG tablet, Take 10 mg by  mouth daily, Disp: , Rfl:      montelukast (SINGULAIR) 10 MG tablet, Take 1 tablet (10 mg) by mouth At Bedtime, Disp: 30 tablet, Rfl: 11     olopatadine (PATANOL) 0.1 % ophthalmic solution, Place 1 drop into both eyes 2 times daily, Disp: 1 Bottle, Rfl: 4     ORDER FOR ALLERGEN IMMUNOTHERAPY, Cat Hair, Standardized A.P. 10,000 BAU/mL, HS  2.0 ml  Alternaria Tenuis 1:10 w/v, HS  0.5 ml Aspergillus Fumigatus 1:10 w/v, HS  0.5 ml Epicoccum Nigrum 1:10 w/v, HS 0.5 ml Penicillium Notatum 1:10 w/v, HS  0.5 ml Diluent: HSA qs to 5ml, Disp: 5 mL, Rfl: prn     ORDER FOR ALLERGEN IMMUNOTHERAPY, Abdiaziz, White 1:20 w/v, HS  0.5 ml Birch Mix PRW 1:20 w/v, HS  0.5 ml Boxelder-Maple Mix BHR (Boxelder Hard Red) 1:20 w/v, HS  0.5 ml Cedar, Red 1:20 w/v, HS  0.5 ml [place], Common 1:20 w/v, HS  0.5 ml Elm, American 1:20 w/v, HS  0.5 ml [place] Mix RW 1:20 w/v, HS 0.5 ml Oak Mix RVW 1:20 w/v, HS 0.5 ml Pine, White 1:20 w/v, ALK 0.5 ml [place], Black 1:20 w/v, HS 0.5 ml Diluent: HSA qs to 5ml, Disp: 5 mL, Rfl: prn     ORDER FOR ALLERGEN IMMUNOTHERAPY, Dog Hair-Dander, A. P.  1:100 w/v, HS  1.0 ml Dust Mites DF. 10,000 AU/mL, HS  0.5 ml Dust Mites DP. 10,000 AU/mL, HS  0.5 ml  Richard Grass 1:20 w/v, HS 0.5 ml Bonilla Grass (Std) 100,000 BAU/mL, HS 0.4 ml Diluent: HSA qs to 5ml, Disp: 5 mL, Rfl: prn     ORDER FOR ALLERGEN IMMUNOTHERAPY, Kochia 1:20 w/v, HS 0.5 ml Lamb's Quarters 1:20 w/v, HS 0.5 ml Nettle 1:20 w/v, HS 0.5 ml Plantain, English 1:20 w/v, HS 0.5 ml Ragweed, Mix (Giant, Short) 1:20 w/v, HS 0.5 ml Russian Thistle 1:20 w/v, HS 0.5 ml Sagebrush, Mugwort 1:20 w/v, HS 0.5 ml Sorrel, Sheep 1:20 w/v, HS 0.5 ml Diluent: HSA qs to 5ml, Disp: 5 mL, Rfl: prn     SUMAtriptan (IMITREX) 50 MG tablet, Take 1 tablet (50 mg) by mouth See Admin Instructions For ONSET OF MIGRAINE, MAY REPEAT ONCE AFTER 2 HRS. DO NOT EXCEED 4 TABLETS IN 24 HRS., Disp: 12 tablet, Rfl: 1     triamcinolone (NASACORT) 55 MCG/ACT nasal aerosol, Spray 2 sprays  into both nostrils daily, Disp: 1 Bottle, Rfl: 11     UNABLE TO FIND, Apply topically daily MEDICATION NAME: testosterone cream, Disp: , Rfl:      UNABLE TO FIND, Place 1 drop into both eyes daily MEDICATION NAME: Alaway 0.035%, Disp: , Rfl:      VITAMIN D, CHOLECALCIFEROL, PO, Take 10,000 Units by mouth daily, Disp: , Rfl:   Immunization History   Administered Date(s) Administered     TD (ADULT, 7+) 11/02/2000     TDAP Vaccine (Adacel) 07/23/2010     Allergies   Allergen Reactions     No Known Drug Allergies      Peanuts [Nuts] Hives     Seasonal Allergies      Shellfish-Derived Products Itching         EXAM:   Constitutional:  Appears well-developed and well-nourished. No distress.   HEENT:   Head: Normocephalic.   Cardiovascular: Normal rate, regular rhythm and normal heart sounds. Exam reveals no gallop and no friction rub.   No murmur heard.  Respiratory: Effort normal and breath sounds normal. No respiratory distress. No wheezes. No rales.   Neuro: Oriented to person, place, and time.  Skin: Skin is warm and dry. No rash noted.   Psychiatric: Normal mood and affect.     Nursing note and vitals reviewed.      ASSESSMENT/PLAN:  Problem List Items Addressed This Visit        Respiratory    Chronic seasonal allergic rhinitis due to pollen     Perennial with spring and fall worsening nasal and ocular symptoms.  Current treatment is loratadine and Alaway.  Did not tolerate nasal steroid.     Skin testing with nonreactive histamine.  Cannot interpret negative results.  Oak was positive.  Blood testing positive for cat, dog, mold, trees, grass, weeds and dust mite.     Cluster immunotherapy   The patient received red 0.05, red 0.10. Each dose was  by 30 minutes. Full report will be scanned into electronic medical record. The patient was in office for over 1.0 hours.           -Singulair 10mg by mouth daily at night.   -Allegra-D daily  -Ketotifen 1 drop/eye twice daily as needed for allergy  symptoms or Patanol (olapatdine) 1 drop/eye twice daily as needed.         Relevant Orders    RAPID DESENSITIZATION (Completed)    Allergic rhinitis due to mold - Primary    Relevant Orders    RAPID DESENSITIZATION (Completed)    Allergic rhinitis due to dust mite    Relevant Orders    RAPID DESENSITIZATION (Completed)    Allergic rhinitis due to animal dander    Relevant Orders    RAPID DESENSITIZATION (Completed)          Chart documentation with Dragon Voice recognition Software. Although reviewed after completion, some words and grammatical errors may remain.    Jesús Lowry DO FAAAAI  Medical Director for Allergy/Immunology at [place], [place]

## 2020-10-09 NOTE — NURSING NOTE
Prior to initiation of cluster immunotherapy RN ensured that patient was feeling healthy, has premedicated with Zyrtec or Allegra yesterday twice daily and this morning. RN also ensured that patient has unexpired Epi-Pen, had no new medication changes, and did not have a reaction after the last allergy shots were given. If the patient has asthma it is well-controlled. The patient has not been ill in the past 7 days. Patient was given allergy injections per cluster immunotherapy protocol 30 minutes apart and vital signs were monitored. Patient was monitored in clinic for 30 minutes after last injection was given and assessed by provider before discharging.     Pallavi Carrillo RN    Cluster Allergen Immunotherapy:    After explaining risks and benefits, and obtaining verbal and written consent, we proceeded with cluster immunotherapy.     VISIT  VIAL COLOR/STRENGTH  DOSES TO BE GIVEN    1  GREEN (1:1000), BLUE (1:100)  GREEN 0.1, GREEN 0.4, BLUE 0.1    2  BLUE (1:100), YELLOW (1:10)  BLUE 0.2, BLUE 0.4, YELLOW 0.05    3  YELLOW (1:10)  YELLOW 0.1, YELLOW 0.15, YELLOW 0.25    4  YELLOW (1:10)  YELLOW 0.35, YELLOW 0.5    5  RED (1:1)  RED 0.05, RED 0.1    6  RED (1:1)  RED 0.15, RED 0.2    7  RED (1:1)  RED 0.3, RED 0.4    8  RED (1:1)  RED 0.5        VISIT 6    Time Injection Given: 1403  Red 1:1   Trees    0.15mL  Red 1:1   Dog, Grass, Dust Mite 0.15mL  Red 1:1   Cat, Molds   0.15mL  Red 1:1   Weeds    0.15mL    Time Injection Given: 1441  Red 1:1   Trees    0.2 mL  Red 1:1   Dog, Grass, Dust Mite 0.2 mL  Red 1:1   Cat, Molds   0.2 mL  Red 1:1   Weeds    0.2 mL      Start Time: 1403  End Time: 1515      VITALS   Time BP Pulse pOx Reaction Treatment   1436 138/78 107 99 - -   1515 116/76 98 99 - -

## 2020-10-13 ENCOUNTER — OFFICE VISIT (OUTPATIENT)
Dept: ALLERGY | Facility: OTHER | Age: 50
End: 2020-10-13
Payer: COMMERCIAL

## 2020-10-13 VITALS
TEMPERATURE: 97.7 F | SYSTOLIC BLOOD PRESSURE: 122 MMHG | HEIGHT: 67 IN | HEART RATE: 111 BPM | WEIGHT: 165 LBS | OXYGEN SATURATION: 99 % | BODY MASS INDEX: 25.9 KG/M2 | DIASTOLIC BLOOD PRESSURE: 76 MMHG

## 2020-10-13 DIAGNOSIS — J30.89 ALLERGIC RHINITIS DUE TO DUST MITE: ICD-10-CM

## 2020-10-13 DIAGNOSIS — J30.81 ALLERGIC RHINITIS DUE TO ANIMAL DANDER: ICD-10-CM

## 2020-10-13 DIAGNOSIS — J30.89 ALLERGIC RHINITIS DUE TO MOLD: ICD-10-CM

## 2020-10-13 DIAGNOSIS — J30.1 CHRONIC SEASONAL ALLERGIC RHINITIS DUE TO POLLEN: Primary | ICD-10-CM

## 2020-10-13 PROCEDURE — 95180 RAPID DESENSITIZATION: CPT | Performed by: ALLERGY & IMMUNOLOGY

## 2020-10-13 PROCEDURE — 99207 PR NO CHARGE LOS: CPT | Performed by: ALLERGY & IMMUNOLOGY

## 2020-10-13 ASSESSMENT — MIFFLIN-ST. JEOR: SCORE: 1401.07

## 2020-10-13 NOTE — PATIENT INSTRUCTIONS
Allergy Staff Appt Hours Shot Hours Locations    Physician     Jesús Lowry DO       Support Staff     SKYLAR Hernadez, GIACOMO  Tuesday:        [place] 7-4:20     Wednesday:        [place]: 7-5     Thursday:                    [place] 7-6:40     Friday:  [place]  7-2:40   [place]        Thursday: 1-5:50        Friday: 7-10:50     [place]        Tuesday: 7- 3:20        Wednesday: 7-4:20     [place] Monday: 7-4:20        Tuesday: 1-6:20         [facility]  [address] [place] [address]  Appt Line: [phone]  Allergy RN:  [phone]    [facility] [place]  [address] [place] [address]  Appt Line: [phone]  Allergy RN:  [phone]       Important Scheduling Information  Aspirin Desensitization: Appt will last 2 clinic days. Please call the Allergy RN line for your clinic to schedule. Discontinue antihistamines 7 days prior to the appointment.     Food Challenges: Appt will last 3-4 hours. Please call the Allergy RN line for your clinic to schedule. Discontinue antihistamines 7 days prior to the appointment.     Penicillin Testing: Appt will last 2-3 hours. Please call the Allergy RN line for your clinic to schedule. Discontinue antihistamines 7 days prior to the appointment.     Skin Testing: Appt will about 40 minutes. Call the appointment line for your clinic to schedule. Discontinue antihistamines 7 days prior to the appointment.     Venom Testing: Appt will last 2-3 hours. Please call the Allergy RN line for your clinic to schedule. Discontinue antihistamines 7 days prior to the appointment.     Thank you for trusting us with your Allergy, Asthma, and Immunology care. Please feel free to contact us with any questions or concerns you may have.

## 2020-10-13 NOTE — PROGRESS NOTES
Sumaya Gatica is a 50 year old White female with previous medical history significant for allergic rhinitis who returns for a follow up visit.     Patient presents today for cluster immunotherapy. The patient is currently in a good state of health. No recent fevers, chills, cough, wheezing, shortness of breath, skin rash, angioedema, nausea, vomiting or diarrhea. The risks and benefits were discussed and the patient/patient's family wishes to proceed. The consent was signed.      Past Medical History:   Diagnosis Date     Abnormal Pap smear, can't excl hi gd sq intraepithelial lesion (ASC-H) 3/22/07    negative colposcopy     History of colposcopy with cervical biopsy 07     Ovarian cysts      Rosacea      Family History   Problem Relation Age of Onset     Alcohol/Drug Paternal Grandfather         alcohlism     Other Cancer Paternal Grandfather      Cancer Maternal Grandfather         gallbladder     Diabetes Maternal Grandmother              Cerebrovascular Disease Paternal Grandmother      Alzheimer Disease Paternal Grandmother         and dementia     Prostate Cancer Brother      Diabetes Mother      Alzheimer Disease Mother      Cancer Brother         skin     Past Surgical History:   Procedure Laterality Date     COLONOSCOPY  2012    Procedure: COLONOSCOPY;  Colonscopy Screening, Diverticulitis;  Surgeon: Reilly Holt MD;  Location:  GI     DILATION AND CURETTAGE, HYSTEROSCOPY, ABLATE ENDOMETRIUM NOVASURE, COMBINED  2011    Procedure:COMBINED DILATION AND CURETTAGE, HYSTEROSCOPY, ABLATE ENDOMETRIUM NOVASURE; hysteroscopy, dialtion and curettage, novasure endometrial ablation  ; Surgeon:MILAD VILLARREAL; Location: OR     GYN SURGERY      [facility] REMOVAL OF TONSILS,<13 Y/O       HC REPAIR OF NASAL SEPTUM  08    Septoplasty, submucosal resection inferior turbinates.     HYSTERECTOMY, PAP NO LONGER INDICATED         REVIEW OF SYSTEMS:  General: negative for  weight gain. negative for weight loss. negative for changes in sleep.   Ears: negative for fullness. negative for hearing loss. negative for dizziness.   Nose: negative for snoring.negative for changes in smell. negative for drainage.   Eyes: negative for eye watering. negative for eye itching. negative for vision changes. negative for eye redness.  Throat: negative for hoarseness. negative for sore throat. negative for trouble swallowing.   Lungs: negative for shortness of breath.negative for wheezing. negative for sputum production.   Cardiovascular: negative for chest pain. negative for swelling of ankles. negative for fast or irregular heartbeat.   Gastrointestinal: negative for nausea. negative for heartburn. negative for acid reflux.   Musculoskeletal: negative for joint pain. negative for joint stiffness. negative for joint swelling.   Neurologic: negative for seizures. negative for fainting. negative for weakness.   Psychiatric: negative for changes in mood. negative for anxiety.   Endocrine: negative for cold intolerance. negative for heat intolerance. negative for tremors.   Lymphatic: negative for lower extremity swelling. negative for lymph node swelling.   Hematologic: negative for easy bruising. negative for easy bleeding.  Integumentary: negative for rash. negative for scaling. negative for nail changes.       Current Outpatient Medications:      calcium carbonate (OS-EDIS 500 MG [place]. [place]) 1250 MG tablet, Take 1 tablet by mouth daily, Disp: , Rfl:      EPINEPHrine (EPIPEN 2-ROMARIO) 0.3 MG/0.3ML injection 2-pack, Inject 0.3 mLs (0.3 mg) into the muscle as needed for anaphylaxis, Disp: 0.6 mL, Rfl: 1     fexofenadine-pseudoePHEDrine (ALLEGRA-D 24) 180-240 MG 24 hr tablet, Take 1 tablet by mouth daily, Disp: 30 tablet, Rfl: 11     ibuprofen (ADVIL,MOTRIN) 200 MG tablet, Take 400 mg by mouth every 4 hours as needed Reported on 2/27/2017, Disp: , Rfl:      loratadine (CLARITIN) 10 MG tablet, Take 10 mg by  mouth daily, Disp: , Rfl:      montelukast (SINGULAIR) 10 MG tablet, Take 1 tablet (10 mg) by mouth At Bedtime, Disp: 30 tablet, Rfl: 11     olopatadine (PATANOL) 0.1 % ophthalmic solution, Place 1 drop into both eyes 2 times daily, Disp: 1 Bottle, Rfl: 4     ORDER FOR ALLERGEN IMMUNOTHERAPY, Cat Hair, Standardized A.P. 10,000 BAU/mL, HS  2.0 ml  Alternaria Tenuis 1:10 w/v, HS  0.5 ml Aspergillus Fumigatus 1:10 w/v, HS  0.5 ml Epicoccum Nigrum 1:10 w/v, HS 0.5 ml Penicillium Notatum 1:10 w/v, HS  0.5 ml Diluent: HSA qs to 5ml, Disp: 5 mL, Rfl: prn     ORDER FOR ALLERGEN IMMUNOTHERAPY, Abdiaziz, White 1:20 w/v, HS  0.5 ml Birch Mix PRW 1:20 w/v, HS  0.5 ml Boxelder-Maple Mix BHR (Boxelder Hard Red) 1:20 w/v, HS  0.5 ml Cedar, Red 1:20 w/v, HS  0.5 ml [place], Common 1:20 w/v, HS  0.5 ml Elm, American 1:20 w/v, HS  0.5 ml [place] Mix RW 1:20 w/v, HS 0.5 ml Oak Mix RVW 1:20 w/v, HS 0.5 ml Pine, White 1:20 w/v, ALK 0.5 ml [place], Black 1:20 w/v, HS 0.5 ml Diluent: HSA qs to 5ml, Disp: 5 mL, Rfl: prn     ORDER FOR ALLERGEN IMMUNOTHERAPY, Dog Hair-Dander, A. P.  1:100 w/v, HS  1.0 ml Dust Mites DF. 10,000 AU/mL, HS  0.5 ml Dust Mites DP. 10,000 AU/mL, HS  0.5 ml  Richard Grass 1:20 w/v, HS 0.5 ml Bonilla Grass (Std) 100,000 BAU/mL, HS 0.4 ml Diluent: HSA qs to 5ml, Disp: 5 mL, Rfl: prn     ORDER FOR ALLERGEN IMMUNOTHERAPY, Kochia 1:20 w/v, HS 0.5 ml Lamb's Quarters 1:20 w/v, HS 0.5 ml Nettle 1:20 w/v, HS 0.5 ml Plantain, English 1:20 w/v, HS 0.5 ml Ragweed, Mix (Giant, Short) 1:20 w/v, HS 0.5 ml Russian Thistle 1:20 w/v, HS 0.5 ml Sagebrush, Mugwort 1:20 w/v, HS 0.5 ml Sorrel, Sheep 1:20 w/v, HS 0.5 ml Diluent: HSA qs to 5ml, Disp: 5 mL, Rfl: prn     SUMAtriptan (IMITREX) 50 MG tablet, Take 1 tablet (50 mg) by mouth See Admin Instructions For ONSET OF MIGRAINE, MAY REPEAT ONCE AFTER 2 HRS. DO NOT EXCEED 4 TABLETS IN 24 HRS., Disp: 12 tablet, Rfl: 1     triamcinolone (NASACORT) 55 MCG/ACT nasal aerosol, Spray 2 sprays  into both nostrils daily, Disp: 1 Bottle, Rfl: 11     UNABLE TO FIND, Apply topically daily MEDICATION NAME: testosterone cream, Disp: , Rfl:      UNABLE TO FIND, Place 1 drop into both eyes daily MEDICATION NAME: Alaway 0.035%, Disp: , Rfl:      VITAMIN D, CHOLECALCIFEROL, PO, Take 10,000 Units by mouth daily, Disp: , Rfl:   Immunization History   Administered Date(s) Administered     TD (ADULT, 7+) 11/02/2000     TDAP Vaccine (Adacel) 07/23/2010     Allergies   Allergen Reactions     No Known Drug Allergies      Peanuts [Nuts] Hives     Seasonal Allergies      Shellfish-Derived Products Itching         EXAM:   Constitutional:  Appears well-developed and well-nourished. No distress.   HEENT:   Head: Normocephalic.   Cardiovascular: Normal rate, regular rhythm and normal heart sounds. Exam reveals no gallop and no friction rub.   No murmur heard.  Respiratory: Effort normal and breath sounds normal. No respiratory distress. No wheezes. No rales.   Musculoskeletal: Normal range of motion.   Neuro: Oriented to person, place, and time.  Skin: Skin is warm and dry. No rash noted.   Psychiatric: Normal mood and affect.     Nursing note and vitals reviewed.    ASSESSMENT/PLAN:  Problem List Items Addressed This Visit        Respiratory    Chronic seasonal allergic rhinitis due to pollen - Primary     Perennial with spring and fall worsening nasal and ocular symptoms.  Current treatment is loratadine and Alaway.  Did not tolerate nasal steroid.     Skin testing with nonreactive histamine.  Cannot interpret negative results.  Oak was positive.  Blood testing positive for cat, dog, mold, trees, grass, weeds and dust mite.     Cluster immunotherapy   The patient received red 0.15, red 0.20. Each dose was  by 30 minutes. Full report will be scanned into electronic medical record. The patient was in office for over 1.0 hours.     -Singulair 10mg by mouth daily at night.   -Allegra-D daily  -Ketotifen 1 drop/eye twice  daily as needed for allergy symptoms or Patanol (olapatdine) 1 drop/eye twice daily as needed.         Relevant Orders    RAPID DESENSITIZATION (Completed)    Allergic rhinitis due to mold    Relevant Orders    RAPID DESENSITIZATION (Completed)    Allergic rhinitis due to dust mite    Relevant Orders    RAPID DESENSITIZATION (Completed)    Allergic rhinitis due to animal dander    Relevant Orders    RAPID DESENSITIZATION (Completed)          Chart documentation with Dragon Voice recognition Software. Although reviewed after completion, some words and grammatical errors may remain.    Jesús Lowry DO FAAAAI  Medical Director for Allergy/Immunology at [place], [place]

## 2020-10-13 NOTE — ASSESSMENT & PLAN NOTE
Perennial with spring and fall worsening nasal and ocular symptoms.  Current treatment is loratadine and Alaway.  Did not tolerate nasal steroid.     Skin testing with nonreactive histamine.  Cannot interpret negative results.  Oak was positive.  Blood testing positive for cat, dog, mold, trees, grass, weeds and dust mite.     Cluster immunotherapy   The patient received red 0.15, red 0.20. Each dose was  by 30 minutes. Full report will be scanned into electronic medical record. The patient was in office for over 1.0 hours.     -Singulair 10mg by mouth daily at night.   -Allegra-D daily  -Ketotifen 1 drop/eye twice daily as needed for allergy symptoms or Patanol (olapatdine) 1 drop/eye twice daily as needed.

## 2020-10-13 NOTE — LETTER
10/13/2020         RE: Sumaya Gatica  [address]  [place] [address]        Dear Colleague,    Thank you for referring your patient, Sumaya Gatica, to the [facility]. Please see a copy of my visit note below.    Sumaya Gatica is a 50 year old White female with previous medical history significant for allergic rhinitis who returns for a follow up visit.     Patient presents today for cluster immunotherapy. The patient is currently in a good state of health. No recent fevers, chills, cough, wheezing, shortness of breath, skin rash, angioedema, nausea, vomiting or diarrhea. The risks and benefits were discussed and the patient/patient's family wishes to proceed. The consent was signed.      Past Medical History:   Diagnosis Date     Abnormal Pap smear, can't excl hi gd sq intraepithelial lesion (ASC-H) 3/22/07    negative colposcopy     History of colposcopy with cervical biopsy 07     Ovarian cysts      Rosacea      Family History   Problem Relation Age of Onset     Alcohol/Drug Paternal Grandfather         alcohlism     Other Cancer Paternal Grandfather      Cancer Maternal Grandfather         gallbladder     Diabetes Maternal Grandmother              Cerebrovascular Disease Paternal Grandmother      Alzheimer Disease Paternal Grandmother         and dementia     Prostate Cancer Brother      Diabetes Mother      Alzheimer Disease Mother      Cancer Brother         skin     Past Surgical History:   Procedure Laterality Date     COLONOSCOPY  2012    Procedure: COLONOSCOPY;  Colonscopy Screening, Diverticulitis;  Surgeon: Reilly Holt MD;  Location:  GI     DILATION AND CURETTAGE, HYSTEROSCOPY, ABLATE ENDOMETRIUM NOVASURE, COMBINED  2011    Procedure:COMBINED DILATION AND CURETTAGE, HYSTEROSCOPY, ABLATE ENDOMETRIUM NOVASURE; hysteroscopy, dialtion and curettage, novasure endometrial ablation  ; Surgeon:MILAD VILLARREAL;  Location:[facility] OR     GYN SURGERY  2015    [facility]      HC REMOVAL OF TONSILS,<13 Y/O       HC REPAIR OF NASAL SEPTUM  12/30/08    Septoplasty, submucosal resection inferior turbinates.     HYSTERECTOMY, PAP NO LONGER INDICATED         REVIEW OF SYSTEMS:  General: negative for weight gain. negative for weight loss. negative for changes in sleep.   Ears: negative for fullness. negative for hearing loss. negative for dizziness.   Nose: negative for snoring.negative for changes in smell. negative for drainage.   Eyes: negative for eye watering. negative for eye itching. negative for vision changes. negative for eye redness.  Throat: negative for hoarseness. negative for sore throat. negative for trouble swallowing.   Lungs: negative for shortness of breath.negative for wheezing. negative for sputum production.   Cardiovascular: negative for chest pain. negative for swelling of ankles. negative for fast or irregular heartbeat.   Gastrointestinal: negative for nausea. negative for heartburn. negative for acid reflux.   Musculoskeletal: negative for joint pain. negative for joint stiffness. negative for joint swelling.   Neurologic: negative for seizures. negative for fainting. negative for weakness.   Psychiatric: negative for changes in mood. negative for anxiety.   Endocrine: negative for cold intolerance. negative for heat intolerance. negative for tremors.   Lymphatic: negative for lower extremity swelling. negative for lymph node swelling.   Hematologic: negative for easy bruising. negative for easy bleeding.  Integumentary: negative for rash. negative for scaling. negative for nail changes.       Current Outpatient Medications:      calcium carbonate (OS-EDIS 500 MG [place]. [place]) 1250 MG tablet, Take 1 tablet by mouth daily, Disp: , Rfl:      EPINEPHrine (EPIPEN 2-ROMARIO) 0.3 MG/0.3ML injection 2-pack, Inject 0.3 mLs (0.3 mg) into the muscle as needed for anaphylaxis, Disp: 0.6 mL, Rfl: 1     fexofenadine-pseudoePHEDrine  (ALLEGRA-D 24) 180-240 MG 24 hr tablet, Take 1 tablet by mouth daily, Disp: 30 tablet, Rfl: 11     ibuprofen (ADVIL,MOTRIN) 200 MG tablet, Take 400 mg by mouth every 4 hours as needed Reported on 2/27/2017, Disp: , Rfl:      loratadine (CLARITIN) 10 MG tablet, Take 10 mg by mouth daily, Disp: , Rfl:      montelukast (SINGULAIR) 10 MG tablet, Take 1 tablet (10 mg) by mouth At Bedtime, Disp: 30 tablet, Rfl: 11     olopatadine (PATANOL) 0.1 % ophthalmic solution, Place 1 drop into both eyes 2 times daily, Disp: 1 Bottle, Rfl: 4     ORDER FOR ALLERGEN IMMUNOTHERAPY, Cat Hair, Standardized A.P. 10,000 BAU/mL, HS  2.0 ml  Alternaria Tenuis 1:10 w/v, HS  0.5 ml Aspergillus Fumigatus 1:10 w/v, HS  0.5 ml Epicoccum Nigrum 1:10 w/v, HS 0.5 ml Penicillium Notatum 1:10 w/v, HS  0.5 ml Diluent: HSA qs to 5ml, Disp: 5 mL, Rfl: prn     ORDER FOR ALLERGEN IMMUNOTHERAPY, Abdiaziz, White 1:20 w/v, HS  0.5 ml Birch Mix PRW 1:20 w/v, HS  0.5 ml Boxelder-Maple Mix BHR (Boxelder Hard Red) 1:20 w/v, HS  0.5 ml Cedar, Red 1:20 w/v, HS  0.5 ml [place], Common 1:20 w/v, HS  0.5 ml Elm, American 1:20 w/v, HS  0.5 ml [place] Mix RW 1:20 w/v, HS 0.5 ml Oak Mix RVW 1:20 w/v, HS 0.5 ml Pine, White 1:20 w/v, ALK 0.5 ml [place] Tree, Black 1:20 w/v, HS 0.5 ml Diluent: HSA qs to 5ml, Disp: 5 mL, Rfl: prn     ORDER FOR ALLERGEN IMMUNOTHERAPY, Dog Hair-Dander, A. P.  1:100 w/v, HS  1.0 ml Dust Mites DF. 10,000 AU/mL, HS  0.5 ml Dust Mites DP. 10,000 AU/mL, HS  0.5 ml  Richard Grass 1:20 w/v, HS 0.5 ml Bonilla Grass (Std) 100,000 BAU/mL, HS 0.4 ml Diluent: HSA qs to 5ml, Disp: 5 mL, Rfl: prn     ORDER FOR ALLERGEN IMMUNOTHERAPY, Kochia 1:20 w/v, HS 0.5 ml Lamb's Quarters 1:20 w/v, HS 0.5 ml Nettle 1:20 w/v, HS 0.5 ml Plantain, English 1:20 w/v, HS 0.5 ml Ragweed, Mix (Giant, Short) 1:20 w/v, HS 0.5 ml Russian Thistle 1:20 w/v, HS 0.5 ml Sagebrush, Mugwort 1:20 w/v, HS 0.5 ml Sorrel, Sheep 1:20 w/v, HS 0.5 ml Diluent: HSA qs to 5ml, Disp: 5 mL, Rfl:  prn     SUMAtriptan (IMITREX) 50 MG tablet, Take 1 tablet (50 mg) by mouth See Admin Instructions For ONSET OF MIGRAINE, MAY REPEAT ONCE AFTER 2 HRS. DO NOT EXCEED 4 TABLETS IN 24 HRS., Disp: 12 tablet, Rfl: 1     triamcinolone (NASACORT) 55 MCG/ACT nasal aerosol, Spray 2 sprays into both nostrils daily, Disp: 1 Bottle, Rfl: 11     UNABLE TO FIND, Apply topically daily MEDICATION NAME: testosterone cream, Disp: , Rfl:      UNABLE TO FIND, Place 1 drop into both eyes daily MEDICATION NAME: Alaway 0.035%, Disp: , Rfl:      VITAMIN D, CHOLECALCIFEROL, PO, Take 10,000 Units by mouth daily, Disp: , Rfl:   Immunization History   Administered Date(s) Administered     TD (ADULT, 7+) 11/02/2000     TDAP Vaccine (Adacel) 07/23/2010     Allergies   Allergen Reactions     No Known Drug Allergies      Peanuts [Nuts] Hives     Seasonal Allergies      Shellfish-Derived Products Itching         EXAM:   Constitutional:  Appears well-developed and well-nourished. No distress.   HEENT:   Head: Normocephalic.   Cardiovascular: Normal rate, regular rhythm and normal heart sounds. Exam reveals no gallop and no friction rub.   No murmur heard.  Respiratory: Effort normal and breath sounds normal. No respiratory distress. No wheezes. No rales.   Musculoskeletal: Normal range of motion.   Neuro: Oriented to person, place, and time.  Skin: Skin is warm and dry. No rash noted.   Psychiatric: Normal mood and affect.     Nursing note and vitals reviewed.    ASSESSMENT/PLAN:  Problem List Items Addressed This Visit        Respiratory    Chronic seasonal allergic rhinitis due to pollen - Primary     Perennial with spring and fall worsening nasal and ocular symptoms.  Current treatment is loratadine and Alaway.  Did not tolerate nasal steroid.     Skin testing with nonreactive histamine.  Cannot interpret negative results.  Oak was positive.  Blood testing positive for cat, dog, mold, trees, grass, weeds and dust mite.     Cluster immunotherapy    The patient received red 0.15, red 0.20. Each dose was  by 30 minutes. Full report will be scanned into electronic medical record. The patient was in office for over 1.0 hours.     -Singulair 10mg by mouth daily at night.   -Allegra-D daily  -Ketotifen 1 drop/eye twice daily as needed for allergy symptoms or Patanol (olapatdine) 1 drop/eye twice daily as needed.         Relevant Orders    RAPID DESENSITIZATION (Completed)    Allergic rhinitis due to mold    Relevant Orders    RAPID DESENSITIZATION (Completed)    Allergic rhinitis due to dust mite    Relevant Orders    RAPID DESENSITIZATION (Completed)    Allergic rhinitis due to animal dander    Relevant Orders    RAPID DESENSITIZATION (Completed)          Chart documentation with Dragon Voice recognition Software. Although reviewed after completion, some words and grammatical errors may remain.    Jesús Lowry DO FAAAAI  Medical Director for Allergy/Immunology at [place], [place]        Again, thank you for allowing me to participate in the care of your patient.        Sincerely,        Jesús Lowry DO

## 2020-10-16 NOTE — PROGRESS NOTES
Cluster Allergen Immunotherapy:    After explaining risks and benefits, and obtaining verbal and written consent, we proceeded with cluster immunotherapy.     VISIT  VIAL COLOR/STRENGTH  DOSES TO BE GIVEN    1  GREEN (1:1000), BLUE (1:100)  GREEN 0.1, GREEN 0.4, BLUE 0.1    2  BLUE (1:100), YELLOW (1:10)  BLUE 0.2, BLUE 0.4, YELLOW 0.05    3  YELLOW (1:10)  YELLOW 0.1, YELLOW 0.15, YELLOW 0.25    4  YELLOW (1:10)  YELLOW 0.35, YELLOW 0.5    5  RED (1:1)  RED 0.05, RED 0.1    6  RED (1:1)  RED 0.15, RED 0.2    7  RED (1:1)  RED 0.3, RED 0.4    8  RED (1:1)  RED 0.5        VISIT 7    Time Injection Given: 1331  Red 1:1   Trees    0.3 mL  Red 1:1   Dog, Grass, Dust Mite 0.3 mL  Red 1:1   Cat, Molds   0.3 mL  Red 1:1   Weeds    0.3 mL    Time Injection Given: 1410  Red 1:1   Trees    0.4 mL  Red 1:1   Dog, Grass, Dust Mite 0.4 mL  Red 1:1   Cat, Molds   0.4 mL  Red 1:1   Weeds    0.4 mL      Start Time: 1331  End Time: 1441      VITALS   Time BP Pulse pOx Reaction Treatment   1404 135/85 89 97 - -   1441 136/93 90 98 - -

## 2020-10-20 ENCOUNTER — OFFICE VISIT (OUTPATIENT)
Dept: ALLERGY | Facility: OTHER | Age: 50
End: 2020-10-20
Payer: COMMERCIAL

## 2020-10-20 VITALS
WEIGHT: 165 LBS | OXYGEN SATURATION: 99 % | HEART RATE: 105 BPM | BODY MASS INDEX: 25.9 KG/M2 | DIASTOLIC BLOOD PRESSURE: 74 MMHG | HEIGHT: 67 IN | SYSTOLIC BLOOD PRESSURE: 116 MMHG

## 2020-10-20 DIAGNOSIS — J30.89 ALLERGIC RHINITIS DUE TO DUST MITE: ICD-10-CM

## 2020-10-20 DIAGNOSIS — J30.1 CHRONIC SEASONAL ALLERGIC RHINITIS DUE TO POLLEN: Primary | ICD-10-CM

## 2020-10-20 DIAGNOSIS — J30.89 ALLERGIC RHINITIS DUE TO MOLD: ICD-10-CM

## 2020-10-20 DIAGNOSIS — J30.81 ALLERGIC RHINITIS DUE TO ANIMAL DANDER: ICD-10-CM

## 2020-10-20 PROCEDURE — 99207 PR NO CHARGE LOS: CPT | Performed by: ALLERGY & IMMUNOLOGY

## 2020-10-20 PROCEDURE — 95180 RAPID DESENSITIZATION: CPT | Performed by: ALLERGY & IMMUNOLOGY

## 2020-10-20 ASSESSMENT — MIFFLIN-ST. JEOR: SCORE: 1401.07

## 2020-10-20 NOTE — ASSESSMENT & PLAN NOTE
Perennial with spring and fall worsening nasal and ocular symptoms.  Current treatment is loratadine and Alaway.  Did not tolerate nasal steroid.     Skin testing with nonreactive histamine.  Cannot interpret negative results.  Oak was positive.  Blood testing positive for cat, dog, mold, trees, grass, weeds and dust mite.    Cluster immunotherapy   The patient received red 0.3, red 0.40. Each dose was  by 30 minutes. Full report will be scanned into electronic medical record. The patient was in office for over 1.0 hours.        -Singulair 10mg by mouth daily at night.   -Allegra-D daily  -Ketotifen 1 drop/eye twice daily as needed for allergy symptoms or Patanol (olapatdine) 1 drop/eye twice daily as needed.

## 2020-10-20 NOTE — PROGRESS NOTES
Sumaya Gatica is a 50 year old White female with previous medical history significant for allergic rhinitis who returns for a follow up visit.     Patient presents today for cluster immunotherapy. The patient is currently in a good state of health. No recent fevers, chills, cough, wheezing, shortness of breath, skin rash, angioedema, nausea, vomiting or diarrhea. The risks and benefits were discussed and the patient/patient's family wishes to proceed. The consent was signed.    Past Medical History:   Diagnosis Date     Abnormal Pap smear, can't excl hi gd sq intraepithelial lesion (ASC-H) 3/22/07    negative colposcopy     History of colposcopy with cervical biopsy 07     Ovarian cysts      Rosacea      Family History   Problem Relation Age of Onset     Alcohol/Drug Paternal Grandfather         alcohlism     Other Cancer Paternal Grandfather      Cancer Maternal Grandfather         gallbladder     Diabetes Maternal Grandmother              Cerebrovascular Disease Paternal Grandmother      Alzheimer Disease Paternal Grandmother         and dementia     Prostate Cancer Brother      Diabetes Mother      Alzheimer Disease Mother      Cancer Brother         skin     Past Surgical History:   Procedure Laterality Date     COLONOSCOPY  2012    Procedure: COLONOSCOPY;  Colonscopy Screening, Diverticulitis;  Surgeon: Reilly Holt MD;  Location:  GI     DILATION AND CURETTAGE, HYSTEROSCOPY, ABLATE ENDOMETRIUM NOVASURE, COMBINED  2011    Procedure:COMBINED DILATION AND CURETTAGE, HYSTEROSCOPY, ABLATE ENDOMETRIUM NOVASURE; hysteroscopy, dialtion and curettage, novasure endometrial ablation  ; Surgeon:MILAD VILLARREAL; Location: OR     GYN SURGERY      [facility] REMOVAL OF TONSILS,<11 Y/O       HC REPAIR OF NASAL SEPTUM  08    Septoplasty, submucosal resection inferior turbinates.     HYSTERECTOMY, PAP NO LONGER INDICATED         REVIEW OF SYSTEMS:  General: negative for weight  gain. negative for weight loss. negative for changes in sleep.   Ears: negative for fullness. negative for hearing loss. negative for dizziness.   Nose: negative for snoring.negative for changes in smell. negative for drainage.   Eyes: negative for eye watering. negative for eye itching. negative for vision changes. negative for eye redness.  Throat: negative for hoarseness. negative for sore throat. negative for trouble swallowing.   Lungs: negative for shortness of breath.negative for wheezing. negative for sputum production.   Cardiovascular: negative for chest pain. negative for swelling of ankles. negative for fast or irregular heartbeat.   Gastrointestinal: negative for nausea. negative for heartburn. negative for acid reflux.   Musculoskeletal: negative for joint pain. negative for joint stiffness. negative for joint swelling.   Neurologic: negative for seizures. negative for fainting. negative for weakness.   Psychiatric: negative for changes in mood. negative for anxiety.   Endocrine: negative for cold intolerance. negative for heat intolerance. negative for tremors.   Lymphatic: negative for lower extremity swelling. negative for lymph node swelling.   Hematologic: negative for easy bruising. negative for easy bleeding.  Integumentary: negative for rash. negative for scaling. negative for nail changes.       Current Outpatient Medications:      calcium carbonate (OS-EDIS 500 MG [place]. [place]) 1250 MG tablet, Take 1 tablet by mouth daily, Disp: , Rfl:      EPINEPHrine (EPIPEN 2-ROMARIO) 0.3 MG/0.3ML injection 2-pack, Inject 0.3 mLs (0.3 mg) into the muscle as needed for anaphylaxis, Disp: 0.6 mL, Rfl: 1     fexofenadine-pseudoePHEDrine (ALLEGRA-D 24) 180-240 MG 24 hr tablet, Take 1 tablet by mouth daily, Disp: 30 tablet, Rfl: 11     ibuprofen (ADVIL,MOTRIN) 200 MG tablet, Take 400 mg by mouth every 4 hours as needed Reported on 2/27/2017, Disp: , Rfl:      loratadine (CLARITIN) 10 MG tablet, Take 10 mg by mouth  daily, Disp: , Rfl:      montelukast (SINGULAIR) 10 MG tablet, Take 1 tablet (10 mg) by mouth At Bedtime, Disp: 30 tablet, Rfl: 11     olopatadine (PATANOL) 0.1 % ophthalmic solution, Place 1 drop into both eyes 2 times daily, Disp: 1 Bottle, Rfl: 4     ORDER FOR ALLERGEN IMMUNOTHERAPY, Cat Hair, Standardized A.P. 10,000 BAU/mL, HS  2.0 ml  Alternaria Tenuis 1:10 w/v, HS  0.5 ml Aspergillus Fumigatus 1:10 w/v, HS  0.5 ml Epicoccum Nigrum 1:10 w/v, HS 0.5 ml Penicillium Notatum 1:10 w/v, HS  0.5 ml Diluent: HSA qs to 5ml, Disp: 5 mL, Rfl: prn     ORDER FOR ALLERGEN IMMUNOTHERAPY, Abdiaziz, White 1:20 w/v, HS  0.5 ml Birch Mix PRW 1:20 w/v, HS  0.5 ml Boxelder-Maple Mix BHR (Boxelder Hard Red) 1:20 w/v, HS  0.5 ml Cedar, Red 1:20 w/v, HS  0.5 ml [place], Common 1:20 w/v, HS  0.5 ml Elm, American 1:20 w/v, HS  0.5 ml [place] Mix RW 1:20 w/v, HS 0.5 ml Oak Mix RVW 1:20 w/v, HS 0.5 ml Pine, White 1:20 w/v, ALK 0.5 ml [place] Tree, Black 1:20 w/v, HS 0.5 ml Diluent: HSA qs to 5ml, Disp: 5 mL, Rfl: prn     ORDER FOR ALLERGEN IMMUNOTHERAPY, Dog Hair-Dander, A. P.  1:100 w/v, HS  1.0 ml Dust Mites DF. 10,000 AU/mL, HS  0.5 ml Dust Mites DP. 10,000 AU/mL, HS  0.5 ml  Richard Grass 1:20 w/v, HS 0.5 ml Bonilla Grass (Std) 100,000 BAU/mL, HS 0.4 ml Diluent: HSA qs to 5ml, Disp: 5 mL, Rfl: prn     ORDER FOR ALLERGEN IMMUNOTHERAPY, Kochia 1:20 w/v, HS 0.5 ml Lamb's Quarters 1:20 w/v, HS 0.5 ml Nettle 1:20 w/v, HS 0.5 ml Plantain, English 1:20 w/v, HS 0.5 ml Ragweed, Mix (Giant, Short) 1:20 w/v, HS 0.5 ml Russian Thistle 1:20 w/v, HS 0.5 ml Sagebrush, Mugwort 1:20 w/v, HS 0.5 ml Sorrel, Sheep 1:20 w/v, HS 0.5 ml Diluent: HSA qs to 5ml, Disp: 5 mL, Rfl: prn     SUMAtriptan (IMITREX) 50 MG tablet, Take 1 tablet (50 mg) by mouth See Admin Instructions For ONSET OF MIGRAINE, MAY REPEAT ONCE AFTER 2 HRS. DO NOT EXCEED 4 TABLETS IN 24 HRS., Disp: 12 tablet, Rfl: 1     triamcinolone (NASACORT) 55 MCG/ACT nasal aerosol, Spray 2 sprays into  both nostrils daily, Disp: 1 Bottle, Rfl: 11     UNABLE TO FIND, Apply topically daily MEDICATION NAME: testosterone cream, Disp: , Rfl:      UNABLE TO FIND, Place 1 drop into both eyes daily MEDICATION NAME: Alaway 0.035%, Disp: , Rfl:      VITAMIN D, CHOLECALCIFEROL, PO, Take 10,000 Units by mouth daily, Disp: , Rfl:   Immunization History   Administered Date(s) Administered     TD (ADULT, 7+) 11/02/2000     TDAP Vaccine (Adacel) 07/23/2010     Allergies   Allergen Reactions     No Known Drug Allergies      Peanuts [Nuts] Hives     Seasonal Allergies      Shellfish-Derived Products Itching         EXAM:   Constitutional:  Appears well-developed and well-nourished. No distress.   HEENT:   Head: Normocephalic.   Cardiovascular: Normal rate, regular rhythm and normal heart sounds. Exam reveals no gallop and no friction rub.   No murmur heard.  Respiratory: Effort normal and breath sounds normal. No respiratory distress. No wheezes. No rales.   Musculoskeletal: Normal range of motion.   Neuro: Oriented to person, place, and time.  Skin: Skin is warm and dry. No rash noted.   Psychiatric: Normal mood and affect.     Nursing note and vitals reviewed.    ASSESSMENT/PLAN:  Problem List Items Addressed This Visit        Respiratory    Chronic seasonal allergic rhinitis due to pollen - Primary     Perennial with spring and fall worsening nasal and ocular symptoms.  Current treatment is loratadine and Alaway.  Did not tolerate nasal steroid.     Skin testing with nonreactive histamine.  Cannot interpret negative results.  Oak was positive.  Blood testing positive for cat, dog, mold, trees, grass, weeds and dust mite.    Cluster immunotherapy   The patient received red 0.3, red 0.40. Each dose was  by 30 minutes. Full report will be scanned into electronic medical record. The patient was in office for over 1.0 hours.        -Singulair 10mg by mouth daily at night.   -Allegra-D daily  -Ketotifen 1 drop/eye twice daily  as needed for allergy symptoms or Patanol (olapatdine) 1 drop/eye twice daily as needed.         Relevant Orders    RAPID DESENSITIZATION (Completed)    Allergic rhinitis due to mold    Relevant Orders    RAPID DESENSITIZATION (Completed)    Allergic rhinitis due to dust mite    Relevant Orders    RAPID DESENSITIZATION (Completed)    Allergic rhinitis due to animal dander    Relevant Orders    RAPID DESENSITIZATION (Completed)          Chart documentation with Dragon Voice recognition Software. Although reviewed after completion, some words and grammatical errors may remain.    Jesús Lowry DO FAAAAI  Medical Director for Allergy/Immunology at [place], [place]

## 2020-10-20 NOTE — PATIENT INSTRUCTIONS
Allergy Staff Appt Hours Shot Hours Locations    Physician     Jesús Lowry DO       Support Staff     SKYLAR Hernadez, GIACOMO  Tuesday:        [place] 7-4:20     Wednesday:        [place]: 7-5     Thursday:                    [place] 7-6:40     Friday:  [place]  7-2:40   [place]        Thursday: 1-5:50        Friday: 7-10:50     [place]        Tuesday: 7- 3:20        Wednesday: 7-4:20     [place] Monday: 7-4:20        Tuesday: 1-6:20         [facility]  [address] [place] [address]  Appt Line: [phone]  Allergy RN:  [phone]    [facility]  [address] [place] [address]  Appt Line: [phone]  Allergy RN:  [phone]       Important Scheduling Information  Aspirin Desensitization: Appt will last 2 clinic days. Please call the Allergy RN line for your clinic to schedule. Discontinue antihistamines 7 days prior to the appointment.     Food Challenges: Appt will last 3-4 hours. Please call the Allergy RN line for your clinic to schedule. Discontinue antihistamines 7 days prior to the appointment.     Penicillin Testing: Appt will last 2-3 hours. Please call the Allergy RN line for your clinic to schedule. Discontinue antihistamines 7 days prior to the appointment.     Skin Testing: Appt will about 40 minutes. Call the appointment line for your clinic to schedule. Discontinue antihistamines 7 days prior to the appointment.     Venom Testing: Appt will last 2-3 hours. Please call the Allergy RN line for your clinic to schedule. Discontinue antihistamines 7 days prior to the appointment.     Thank you for trusting us with your Allergy, Asthma, and Immunology care. Please feel free to contact us with any questions or concerns you may have.

## 2020-10-20 NOTE — LETTER
10/20/2020         RE: Sumaya Gatica  [id number]  [place] [address]        Dear Colleague,    Thank you for referring your patient, Sumaya Gatica, to the [facility]. Please see a copy of my visit note below.    Cluster Allergen Immunotherapy:    After explaining risks and benefits, and obtaining verbal and written consent, we proceeded with cluster immunotherapy.     VISIT  VIAL COLOR/STRENGTH  DOSES TO BE GIVEN    1  GREEN (1:1000), BLUE (1:100)  GREEN 0.1, GREEN 0.4, BLUE 0.1    2  BLUE (1:100), YELLOW (1:10)  BLUE 0.2, BLUE 0.4, YELLOW 0.05    3  YELLOW (1:10)  YELLOW 0.1, YELLOW 0.15, YELLOW 0.25    4  YELLOW (1:10)  YELLOW 0.35, YELLOW 0.5    5  RED (1:1)  RED 0.05, RED 0.1    6  RED (1:1)  RED 0.15, RED 0.2    7  RED (1:1)  RED 0.3, RED 0.4    8  RED (1:1)  RED 0.5        VISIT 7    Time Injection Given: 1331  Red 1:1   Trees    0.3 mL  Red 1:1   Dog, Grass, Dust Mite 0.3 mL  Red 1:1   Cat, Molds   0.3 mL  Red 1:1   Weeds    0.3 mL    Time Injection Given: 1410  Red 1:1   Trees    0.4 mL  Red 1:1   Dog, Grass, Dust Mite 0.4 mL  Red 1:1   Cat, Molds   0.4 mL  Red 1:1   Weeds    0.4 mL      Start Time: 1331  End Time: 1440        Sumaya Gatica is a 50 year old White female with previous medical history significant for allergic rhinitis who returns for a follow up visit.     Patient presents today for cluster immunotherapy. The patient is currently in a good state of health. No recent fevers, chills, cough, wheezing, shortness of breath, skin rash, angioedema, nausea, vomiting or diarrhea. The risks and benefits were discussed and the patient/patient's family wishes to proceed. The consent was signed.    Past Medical History:   Diagnosis Date     Abnormal Pap smear, can't excl hi gd sq intraepithelial lesion (ASC-H) 3/22/07    negative colposcopy     History of colposcopy with cervical biopsy 4/23/07     Ovarian cysts      Rosacea      Family History    Problem Relation Age of Onset     Alcohol/Drug Paternal Grandfather         alcohlism     Other Cancer Paternal Grandfather      Cancer Maternal Grandfather         gallbladder     Diabetes Maternal Grandmother              Cerebrovascular Disease Paternal Grandmother      Alzheimer Disease Paternal Grandmother         and dementia     Prostate Cancer Brother      Diabetes Mother      Alzheimer Disease Mother      Cancer Brother         skin     Past Surgical History:   Procedure Laterality Date     COLONOSCOPY  2012    Procedure: COLONOSCOPY;  Colonscopy Screening, Diverticulitis;  Surgeon: Reilly Holt MD;  Location:  GI     DILATION AND CURETTAGE, HYSTEROSCOPY, ABLATE ENDOMETRIUM NOVASURE, COMBINED  2011    Procedure:COMBINED DILATION AND CURETTAGE, HYSTEROSCOPY, ABLATE ENDOMETRIUM NOVASURE; hysteroscopy, dialtion and curettage, novasure endometrial ablation  ; Surgeon:MILAD VILLARREAL; Location: OR     GYN SURGERY      Hy      HC REMOVAL OF TONSILS,<11 Y/O       HC REPAIR OF NASAL SEPTUM  08    Septoplasty, submucosal resection inferior turbinates.     HYSTERECTOMY, PAP NO LONGER INDICATED         REVIEW OF SYSTEMS:  General: negative for weight gain. negative for weight loss. negative for changes in sleep.   Ears: negative for fullness. negative for hearing loss. negative for dizziness.   Nose: negative for snoring.negative for changes in smell. negative for drainage.   Eyes: negative for eye watering. negative for eye itching. negative for vision changes. negative for eye redness.  Throat: negative for hoarseness. negative for sore throat. negative for trouble swallowing.   Lungs: negative for shortness of breath.negative for wheezing. negative for sputum production.   Cardiovascular: negative for chest pain. negative for swelling of ankles. negative for fast or irregular heartbeat.   Gastrointestinal: negative for nausea. negative for heartburn. negative for acid  reflux.   Musculoskeletal: negative for joint pain. negative for joint stiffness. negative for joint swelling.   Neurologic: negative for seizures. negative for fainting. negative for weakness.   Psychiatric: negative for changes in mood. negative for anxiety.   Endocrine: negative for cold intolerance. negative for heat intolerance. negative for tremors.   Lymphatic: negative for lower extremity swelling. negative for lymph node swelling.   Hematologic: negative for easy bruising. negative for easy bleeding.  Integumentary: negative for rash. negative for scaling. negative for nail changes.       Current Outpatient Medications:      calcium carbonate (OS-EDIS 500 MG [place]. CA) 1250 MG tablet, Take 1 tablet by mouth daily, Disp: , Rfl:      EPINEPHrine (EPIPEN 2-ROMARIO) 0.3 MG/0.3ML injection 2-pack, Inject 0.3 mLs (0.3 mg) into the muscle as needed for anaphylaxis, Disp: 0.6 mL, Rfl: 1     fexofenadine-pseudoePHEDrine (ALLEGRA-D 24) 180-240 MG 24 hr tablet, Take 1 tablet by mouth daily, Disp: 30 tablet, Rfl: 11     ibuprofen (ADVIL,MOTRIN) 200 MG tablet, Take 400 mg by mouth every 4 hours as needed Reported on 2/27/2017, Disp: , Rfl:      loratadine (CLARITIN) 10 MG tablet, Take 10 mg by mouth daily, Disp: , Rfl:      montelukast (SINGULAIR) 10 MG tablet, Take 1 tablet (10 mg) by mouth At Bedtime, Disp: 30 tablet, Rfl: 11     olopatadine (PATANOL) 0.1 % ophthalmic solution, Place 1 drop into both eyes 2 times daily, Disp: 1 Bottle, Rfl: 4     ORDER FOR ALLERGEN IMMUNOTHERAPY, Cat Hair, Standardized A.P. 10,000 BAU/mL, HS  2.0 ml  Alternaria Tenuis 1:10 w/v, HS  0.5 ml Aspergillus Fumigatus 1:10 w/v, HS  0.5 ml Epicoccum Nigrum 1:10 w/v, HS 0.5 ml Penicillium Notatum 1:10 w/v, HS  0.5 ml Diluent: HSA qs to 5ml, Disp: 5 mL, Rfl: prn     ORDER FOR ALLERGEN IMMUNOTHERAPY, Abdiaziz, White 1:20 w/v, HS  0.5 ml Birch Mix PRW 1:20 w/v, HS  0.5 ml Boxelder-Maple Mix BHR (Boxelder Hard Red) 1:20 w/v, HS  0.5 ml Cedar, Red 1:20 w/v, HS   0.5 ml [place], Common 1:20 w/v, HS  0.5 ml Elm, American 1:20 w/v, HS  0.5 ml [place] Mix RW 1:20 w/v, HS 0.5 ml Oak Mix RVW 1:20 w/v, HS 0.5 ml Pine, White 1:20 w/v, ALK 0.5 ml [place] Tree, Black 1:20 w/v, HS 0.5 ml Diluent: HSA qs to 5ml, Disp: 5 mL, Rfl: prn     ORDER FOR ALLERGEN IMMUNOTHERAPY, Dog Hair-Dander, A. P.  1:100 w/v, HS  1.0 ml Dust Mites DF. 10,000 AU/mL, HS  0.5 ml Dust Mites DP. 10,000 AU/mL, HS  0.5 ml  Richard Grass 1:20 w/v, HS 0.5 ml Bonilla Grass (Std) 100,000 BAU/mL, HS 0.4 ml Diluent: HSA qs to 5ml, Disp: 5 mL, Rfl: prn     ORDER FOR ALLERGEN IMMUNOTHERAPY, Kochia 1:20 w/v, HS 0.5 ml Lamb's Quarters 1:20 w/v, HS 0.5 ml Nettle 1:20 w/v, HS 0.5 ml Plantain, English 1:20 w/v, HS 0.5 ml Ragweed, Mix (Giant, Short) 1:20 w/v, HS 0.5 ml Russian Thistle 1:20 w/v, HS 0.5 ml Sagebrush, Mugwort 1:20 w/v, HS 0.5 ml Sorrel, Sheep 1:20 w/v, HS 0.5 ml Diluent: HSA qs to 5ml, Disp: 5 mL, Rfl: prn     SUMAtriptan (IMITREX) 50 MG tablet, Take 1 tablet (50 mg) by mouth See Admin Instructions For ONSET OF MIGRAINE, MAY REPEAT ONCE AFTER 2 HRS. DO NOT EXCEED 4 TABLETS IN 24 HRS., Disp: 12 tablet, Rfl: 1     triamcinolone (NASACORT) 55 MCG/ACT nasal aerosol, Spray 2 sprays into both nostrils daily, Disp: 1 Bottle, Rfl: 11     UNABLE TO FIND, Apply topically daily MEDICATION NAME: testosterone cream, Disp: , Rfl:      UNABLE TO FIND, Place 1 drop into both eyes daily MEDICATION NAME: Alaway 0.035%, Disp: , Rfl:      VITAMIN D, CHOLECALCIFEROL, PO, Take 10,000 Units by mouth daily, Disp: , Rfl:   Immunization History   Administered Date(s) Administered     TD (ADULT, 7+) 11/02/2000     TDAP Vaccine (Adacel) 07/23/2010     Allergies   Allergen Reactions     No Known Drug Allergies      Peanuts [Nuts] Hives     Seasonal Allergies      Shellfish-Derived Products Itching         EXAM:   Constitutional:  Appears well-developed and well-nourished. No distress.   HEENT:   Head: Normocephalic.   Cardiovascular:  Normal rate, regular rhythm and normal heart sounds. Exam reveals no gallop and no friction rub.   No murmur heard.  Respiratory: Effort normal and breath sounds normal. No respiratory distress. No wheezes. No rales.   Musculoskeletal: Normal range of motion.   Neuro: Oriented to person, place, and time.  Skin: Skin is warm and dry. No rash noted.   Psychiatric: Normal mood and affect.     Nursing note and vitals reviewed.    ASSESSMENT/PLAN:  Problem List Items Addressed This Visit        Respiratory    Chronic seasonal allergic rhinitis due to pollen - Primary     Perennial with spring and fall worsening nasal and ocular symptoms.  Current treatment is loratadine and Alaway.  Did not tolerate nasal steroid.     Skin testing with nonreactive histamine.  Cannot interpret negative results.  Oak was positive.  Blood testing positive for cat, dog, mold, trees, grass, weeds and dust mite.    Cluster immunotherapy   The patient received red 0.3, red 0.40. Each dose was  by 30 minutes. Full report will be scanned into electronic medical record. The patient was in office for over 1.0 hours.        -Singulair 10mg by mouth daily at night.   -Allegra-D daily  -Ketotifen 1 drop/eye twice daily as needed for allergy symptoms or Patanol (olapatdine) 1 drop/eye twice daily as needed.         Relevant Orders    RAPID DESENSITIZATION (Completed)    Allergic rhinitis due to mold    Relevant Orders    RAPID DESENSITIZATION (Completed)    Allergic rhinitis due to dust mite    Relevant Orders    RAPID DESENSITIZATION (Completed)    Allergic rhinitis due to animal dander    Relevant Orders    RAPID DESENSITIZATION (Completed)          Chart documentation with Dragon Voice recognition Software. Although reviewed after completion, some words and grammatical errors may remain.    Jesús Lowry DO FAAAAI  Medical Director for Allergy/Immunology at [facility] and [place], [place]        Again, thank  you for allowing me to participate in the care of your patient.        Sincerely,        Jesús Lowry, DO

## 2020-10-23 ENCOUNTER — E-VISIT (OUTPATIENT)
Dept: FAMILY MEDICINE | Facility: OTHER | Age: 50
End: 2020-10-23
Payer: COMMERCIAL

## 2020-10-23 DIAGNOSIS — F41.9 ANXIETY: ICD-10-CM

## 2020-10-23 PROCEDURE — 99421 OL DIG E/M SVC 5-10 MIN: CPT | Performed by: PHYSICIAN ASSISTANT

## 2020-10-23 RX ORDER — ESCITALOPRAM OXALATE 10 MG/1
10 TABLET ORAL DAILY
Qty: 30 TABLET | Refills: 1 | Status: SHIPPED | OUTPATIENT
Start: 2020-10-23 | End: 2021-01-25

## 2020-10-23 ASSESSMENT — ANXIETY QUESTIONNAIRES
GAD7 TOTAL SCORE: 12
5. BEING SO RESTLESS THAT IT IS HARD TO SIT STILL: MORE THAN HALF THE DAYS
GAD7 TOTAL SCORE: 12
7. FEELING AFRAID AS IF SOMETHING AWFUL MIGHT HAPPEN: NOT AT ALL
GAD7 TOTAL SCORE: 12
2. NOT BEING ABLE TO STOP OR CONTROL WORRYING: MORE THAN HALF THE DAYS
7. FEELING AFRAID AS IF SOMETHING AWFUL MIGHT HAPPEN: NOT AT ALL
4. TROUBLE RELAXING: MORE THAN HALF THE DAYS
1. FEELING NERVOUS, ANXIOUS, OR ON EDGE: MORE THAN HALF THE DAYS
3. WORRYING TOO MUCH ABOUT DIFFERENT THINGS: MORE THAN HALF THE DAYS
6. BECOMING EASILY ANNOYED OR IRRITABLE: MORE THAN HALF THE DAYS

## 2020-10-23 ASSESSMENT — PATIENT HEALTH QUESTIONNAIRE - PHQ9
SUM OF ALL RESPONSES TO PHQ QUESTIONS 1-9: 9
SUM OF ALL RESPONSES TO PHQ QUESTIONS 1-9: 9
10. IF YOU CHECKED OFF ANY PROBLEMS, HOW DIFFICULT HAVE THESE PROBLEMS MADE IT FOR YOU TO DO YOUR WORK, TAKE CARE OF THINGS AT HOME, OR GET ALONG WITH OTHER PEOPLE: SOMEWHAT DIFFICULT

## 2020-10-24 ASSESSMENT — PATIENT HEALTH QUESTIONNAIRE - PHQ9: SUM OF ALL RESPONSES TO PHQ QUESTIONS 1-9: 9

## 2020-10-24 ASSESSMENT — ANXIETY QUESTIONNAIRES: GAD7 TOTAL SCORE: 12

## 2020-10-28 ENCOUNTER — ALLIED HEALTH/NURSE VISIT (OUTPATIENT)
Dept: ALLERGY | Facility: OTHER | Age: 50
End: 2020-10-28
Payer: COMMERCIAL

## 2020-10-28 DIAGNOSIS — Z51.6 NEED FOR DESENSITIZATION TO ALLERGENS: Primary | ICD-10-CM

## 2020-10-28 PROCEDURE — 99207 PR DROP WITH A PROCEDURE: CPT

## 2020-10-28 PROCEDURE — 95117 IMMUNOTHERAPY INJECTIONS: CPT

## 2020-10-28 NOTE — PROGRESS NOTES
Patient presented after waiting 30 minutes with no reaction to allergy injections. Discharged from clinic.    Kay Hammonds RN

## 2020-11-10 ENCOUNTER — ALLIED HEALTH/NURSE VISIT (OUTPATIENT)
Dept: ALLERGY | Facility: OTHER | Age: 50
End: 2020-11-10
Payer: COMMERCIAL

## 2020-11-10 DIAGNOSIS — Z51.6 NEED FOR DESENSITIZATION TO ALLERGENS: Primary | ICD-10-CM

## 2020-11-10 PROCEDURE — 99207 PR DROP WITH A PROCEDURE: CPT

## 2020-11-10 PROCEDURE — 95117 IMMUNOTHERAPY INJECTIONS: CPT

## 2020-11-10 NOTE — PROGRESS NOTES
Patient presented after waiting 30 minutes with no reaction to allergy injections. Discharged from clinic.    Margot Miller RN ............   11/10/2020...3:09 PM

## 2020-11-25 ENCOUNTER — ALLIED HEALTH/NURSE VISIT (OUTPATIENT)
Dept: ALLERGY | Facility: OTHER | Age: 50
End: 2020-11-25
Payer: COMMERCIAL

## 2020-11-25 DIAGNOSIS — Z51.6 NEED FOR DESENSITIZATION TO ALLERGENS: Primary | ICD-10-CM

## 2020-11-25 PROCEDURE — 99207 PR DROP WITH A PROCEDURE: CPT

## 2020-11-25 PROCEDURE — 95117 IMMUNOTHERAPY INJECTIONS: CPT

## 2020-11-25 NOTE — PROGRESS NOTES
Patient presented after waiting 30 minutes with no reaction to allergy injections. Discharged from clinic.    Margot Miller RN, RN ............   11/25/2020...3:07 PM

## 2020-11-27 ENCOUNTER — TELEPHONE (OUTPATIENT)
Dept: FAMILY MEDICINE | Facility: OTHER | Age: 50
End: 2020-11-27

## 2020-11-27 ENCOUNTER — E-VISIT (OUTPATIENT)
Dept: FAMILY MEDICINE | Facility: OTHER | Age: 50
End: 2020-11-27
Payer: COMMERCIAL

## 2020-11-27 DIAGNOSIS — F41.9 ANXIETY: ICD-10-CM

## 2020-11-27 DIAGNOSIS — N39.0 ACUTE UTI (URINARY TRACT INFECTION): ICD-10-CM

## 2020-11-27 DIAGNOSIS — R31.9 HEMATURIA, UNSPECIFIED TYPE: Primary | ICD-10-CM

## 2020-11-27 DIAGNOSIS — R82.90 NONSPECIFIC FINDING ON EXAMINATION OF URINE: Primary | ICD-10-CM

## 2020-11-27 LAB
ALBUMIN UR-MCNC: 30 MG/DL
APPEARANCE UR: ABNORMAL
BACTERIA #/AREA URNS HPF: ABNORMAL /HPF
BILIRUB UR QL STRIP: NEGATIVE
COLOR UR AUTO: ABNORMAL
GLUCOSE UR STRIP-MCNC: NEGATIVE MG/DL
HGB UR QL STRIP: ABNORMAL
KETONES UR STRIP-MCNC: NEGATIVE MG/DL
LEUKOCYTE ESTERASE UR QL STRIP: ABNORMAL
NITRATE UR QL: NEGATIVE
NON-SQ EPI CELLS #/AREA URNS LPF: ABNORMAL /LPF
PH UR STRIP: 6 PH (ref 5–7)
RBC #/AREA URNS AUTO: ABNORMAL /HPF
SOURCE: ABNORMAL
SP GR UR STRIP: 1.01 (ref 1–1.03)
UROBILINOGEN UR STRIP-ACNC: 0.2 EU/DL (ref 0.2–1)
WBC #/AREA URNS AUTO: ABNORMAL /HPF

## 2020-11-27 PROCEDURE — 99421 OL DIG E/M SVC 5-10 MIN: CPT | Performed by: PHYSICIAN ASSISTANT

## 2020-11-27 PROCEDURE — 81001 URINALYSIS AUTO W/SCOPE: CPT | Performed by: FAMILY MEDICINE

## 2020-11-27 PROCEDURE — 87086 URINE CULTURE/COLONY COUNT: CPT | Performed by: FAMILY MEDICINE

## 2020-11-27 RX ORDER — ESCITALOPRAM OXALATE 10 MG/1
10 TABLET ORAL DAILY
Qty: 30 TABLET | Refills: 1 | OUTPATIENT
Start: 2020-11-27

## 2020-11-27 RX ORDER — SULFAMETHOXAZOLE/TRIMETHOPRIM 800-160 MG
1 TABLET ORAL 2 TIMES DAILY
Qty: 6 TABLET | Refills: 0 | Status: SHIPPED | OUTPATIENT
Start: 2020-11-27 | End: 2020-11-30

## 2020-11-27 NOTE — TELEPHONE ENCOUNTER
patient is coming in for a urine for a  E visit with you  .thank you  Kari CALDERON) [facility]

## 2020-11-27 NOTE — PATIENT INSTRUCTIONS
Thank you for choosing us for your care. I have placed an order for a prescription so that you can start treatment. View your full visit summary for details by clicking on the link below. Your pharmacist will able to address any questions you may have about the medication.     If you re not feeling better within 2-3 days, please schedule an appointment.  You can schedule an appointment right here in [employer] ([place]), or call [phone]  If the visit is for the same symptoms as your e-visit, we ll refund the cost of your e-visit if seen within seven days.      Urinary Tract Infections in Women    Urinary tract infections (UTIs) are most often caused by bacteria. These bacteria enter the urinary tract. The bacteria may come from inside the body. Or they may travel from the skin outside the rectum or vagina into the urethra. Female anatomy makes it easy for bacteria from the bowel to enter a woman s urinary tract, which is the most common source of UTI. This means women develop UTIs more often than men. Pain in or around the urinary tract is a common UTI symptom. But the only way to know for sure if you have a UTI for the healthcare provider to test your urine. The two tests that may be done are the urinalysis and urine culture.   Types of UTIs    Cystitis. A bladder infection (cystitis) is the most common UTI in women. You may have urgent or frequent need to pee. You may also have pain, burning when you pee, and bloody urine.    Urethritis. This is an inflamed urethra, which is the tube that carries urine from the bladder to outside the body. You may have lower stomach or back pain. You may also have urgent or frequent need to pee.    Pyelonephritis. This is a kidney infection. If not treated, it can be serious and damage your kidneys. In severe cases, you may need to stay in the hospital. You may have a fever and lower back pain.    Medicines to treat a UTI  Most UTIs are treated with antibiotics. These kill the bacteria.  The length of time you need to take them depends on the type of infection. It may be as short as 3 days. If you have repeated UTIs, you may need a low-dose antibiotic for several months. Take antibiotics exactly as directed. Don t stop taking them until all of the medicine is gone. If you stop taking the antibiotic too soon, the infection may not go away. You may also develop a resistance to the antibiotic. This can make it much harder to treat.   Lifestyle changes to treat and prevent UTIs   The lifestyle changes below will help get rid of your UTI. They may also help prevent future UTIs.     Drink plenty of fluids. This includes water, juice, or other caffeine-free drinks. Fluids help flush bacteria out of your body.    Empty your bladder. Always empty your bladder when you feel the urge to pee. And always pee before going to sleep. Urine that stays in your bladder can lead to infection. Try to pee before and after sex as well.    Practice good personal hygiene. Wipe yourself from front to back after using the toilet. This helps keep bacteria from getting into the urethra.    Use condoms during sex. These help prevent UTIs caused by sexually transmitted bacteria. Also don't use spermicides during sex. These can increase the risk for UTIs. Choose other forms of birth control instead. For women who tend to get UTIs after sex, a low-dose of a preventive antibiotic may be used. Be sure to discuss this option with your healthcare provider.    Follow up with your healthcare provider as directed. He or she may test to make sure the infection has cleared. If needed, more treatment may be started.  [employer] last reviewed this educational content on 7/1/2019 2000-2020 [employer]. [address] [place] [id number]. All rights reserved. This information is not intended as a substitute for professional medical care. Always follow your healthcare professional's instructions.

## 2020-11-27 NOTE — TELEPHONE ENCOUNTER
Sent 10/23/2020, with 2 month supply. Should not need refills at this time    Soheila Duncan RN

## 2020-11-28 LAB
BACTERIA SPEC CULT: NORMAL
Lab: NORMAL
SPECIMEN SOURCE: NORMAL

## 2020-11-30 NOTE — RESULT ENCOUNTER NOTE
Sumaya,    Your  Urine didn't grow a clear infection.  If you're not feeling better, stop the antibiotics.  If you are, then complete the course.    Have a nice day!    Dr. Rodrigues

## 2020-12-08 ENCOUNTER — VIRTUAL VISIT (OUTPATIENT)
Dept: SLEEP MEDICINE | Facility: CLINIC | Age: 50
End: 2020-12-08
Payer: COMMERCIAL

## 2020-12-08 DIAGNOSIS — R06.81 APNEA: ICD-10-CM

## 2020-12-08 DIAGNOSIS — R06.83 SNORING: Primary | ICD-10-CM

## 2020-12-08 DIAGNOSIS — G47.19 EXCESSIVE DAYTIME SLEEPINESS: ICD-10-CM

## 2020-12-08 PROCEDURE — 99204 OFFICE O/P NEW MOD 45 MIN: CPT | Mod: 95 | Performed by: OTOLARYNGOLOGY

## 2020-12-08 RX ORDER — PROGESTERONE 100 %
POWDER (GRAM) MISCELLANEOUS
COMMUNITY
Start: 2020-07-27

## 2020-12-08 NOTE — PROGRESS NOTES
"Sumaya Gatica is a 50 year old female who is being evaluated via a billable video visit.      The patient has been notified of following:     \"This video visit will be conducted via a call between you and your physician/provider. We have found that certain health care needs can be provided without the need for an in-person physical exam.  This service lets us provide the care you need with a video conversation.  If a prescription is necessary we can send it directly to your pharmacy.  If lab work is needed we can place an order for that and you can then stop by our lab to have the test done at a later time.    Video visits are billed at different rates depending on your insurance coverage.  Please reach out to your insurance provider with any questions.    If during the course of the call the physician/provider feels a video visit is not appropriate, you will not be charged for this service.\"    Patient has given verbal consent for Video visit? Yes  How would you like to obtain your AVS? MyChart  If you are dropped from the video visit, the video invite should be resent to: Text to cell phone: [phone]  Will anyone else be joining your video visit? No      Video-Visit Details    Type of service:  Video Visit    Video Start Time: 2:05  Video End Time: 2:37    Originating Location (pt. Location): Home    Distant Location (provider location):  [facility]     Platform used for Video Visit: Doximity              Does Sumaya have a CPAP/Bipap?  No     [place] Sleep Scale: 5    SUBJECTIVE: Sumaya Gatica  50 year old  female. who presents with a history of snoring from number of years getting worse.  There have been noted apneas.  Patient does occasionally wake up gasping or choking.  Patient usually maintains a regular sleep schedule.  She tries to go to bed at 9:30 at night.  Usually falls asleep very quickly within a few minutes.  However wakes up anywhere from 2-10 times a night " anywhere from 10 minutes to not being able to go back to sleep.  She wakes up for uncertain reasons.  In the morning she usually has to get up at 5 AM during the week and uses alarm on a regular basis.  On the weekends she goes to sleep at similar time but can sleep in between 6 and 8 AM.  However due to multiple awakenings she feels she gets maybe about 5 hours of pure sleep.  She tries to maintain good sleep hygiene otherwise avoiding any electronics in bed reading or watching TV in bed.  There have been witnessed apneas along snoring as per her 's report.  Significant family history of sleep apnea with patient's father and 2 brothers: CPAP.  Her [place] score is 11 which represents moderate daytime sleepiness.  Patient takes very occasional naps about 10 to 20 minutes which gives some refreshed feeling for a short period of time.  Patient does have nasal obstruction congestion constantly.  She does take allergy shots and has seasonal and perennial allergies to peanuts shellfish dust multiple seasonal allergies.  Shots help but do not eliminate nasal obstruction.  She does experience dry mouth in the mornings.  She is on Allegra-D Singulair and fluticasone.  Again those did not seem to help her nasal congestion.  She denies history of nasal trauma.  There is no history of restless leg syndrome symptoms or parasomnias.  She consumes coffee very rarely with maybe 1 cup a day only in the morning before noon time.  Even though she denies excessive sleepiness at the wheel we counseled the patient importance of vigilance while driving and avoiding driving in a sleepy state.   Negative: SOB, UMAÑA, Chest pain, pressure, palpitations, -N/V/D.    Past Medical History:   Diagnosis Date     Abnormal Pap smear, can't excl hi gd sq intraepithelial lesion (ASC-H) 3/22/07    negative colposcopy     History of colposcopy with cervical biopsy 4/23/07     Ovarian cysts      Rosacea       Past Surgical History:   Procedure  Laterality Date     COLONOSCOPY  6/22/2012    Procedure: COLONOSCOPY;  Colonscopy Screening, Diverticulitis;  Surgeon: Reilly Holt MD;  Location:  GI     DILATION AND CURETTAGE, HYSTEROSCOPY, ABLATE ENDOMETRIUM NOVASURE, COMBINED  12/30/2011    Procedure:COMBINED DILATION AND CURETTAGE, HYSTEROSCOPY, ABLATE ENDOMETRIUM NOVASURE; hysteroscopy, dialtion and curettage, novasure endometrial ablation  ; Surgeon:MILAD VILLARREAL; Location: OR     GYN SURGERY  2015    Hyst      HC REMOVAL OF TONSILS,<13 Y/O       HC REPAIR OF NASAL SEPTUM  12/30/08    Septoplasty, submucosal resection inferior turbinates.     HYSTERECTOMY, PAP NO LONGER INDICATED     .   Current Outpatient Medications   Medication Sig Dispense Refill     calcium carbonate (OS-EDIS 500 MG [place]. [place]) 1250 MG tablet Take 1 tablet by mouth daily       EPINEPHrine (EPIPEN 2-ROMARIO) 0.3 MG/0.3ML injection 2-pack Inject 0.3 mLs (0.3 mg) into the muscle as needed for anaphylaxis 0.6 mL 1     escitalopram (LEXAPRO) 10 MG tablet Take 1 tablet (10 mg) by mouth daily 30 tablet 1     fexofenadine-pseudoePHEDrine (ALLEGRA-D 24) 180-240 MG 24 hr tablet Take 1 tablet by mouth daily 30 tablet 11     ibuprofen (ADVIL,MOTRIN) 200 MG tablet Take 400 mg by mouth every 4 hours as needed Reported on 2/27/2017       loratadine (CLARITIN) 10 MG tablet Take 10 mg by mouth daily       montelukast (SINGULAIR) 10 MG tablet Take 1 tablet (10 mg) by mouth At Bedtime 30 tablet 11     olopatadine (PATANOL) 0.1 % ophthalmic solution Place 1 drop into both eyes 2 times daily 1 Bottle 4     ORDER FOR ALLERGEN IMMUNOTHERAPY Cat Hair, Standardized A.P. 10,000 BAU/mL, HS  2.0 ml   Alternaria Tenuis 1:10 w/v, HS  0.5 ml  Aspergillus Fumigatus 1:10 w/v, HS  0.5 ml  Epicoccum Nigrum 1:10 w/v, HS 0.5 ml  Penicillium Notatum 1:10 w/v, HS  0.5 ml  Diluent: HSA qs to 5ml 5 mL prn     ORDER FOR ALLERGEN IMMUNOTHERAPY Abdiaziz, White 1:20 w/v, HS  0.5 ml  Birch Mix PRW 1:20 w/v, HS  0.5  ml  Boxelder-Maple Mix BHR (Boxelder Hard Red) 1:20 w/v, HS  0.5 ml  Cedar, Red 1:20 w/v, HS  0.5 ml  [place], Common 1:20 w/v, HS  0.5 ml  Elm, American 1:20 w/v, HS  0.5 ml  [place] Mix RW 1:20 w/v, HS 0.5 ml  Oak Mix RVW 1:20 w/v, HS 0.5 ml  Pine, White 1:20 w/v, ALK 0.5 ml  [place] Tree, Black 1:20 w/v, HS 0.5 ml  Diluent: HSA qs to 5ml 5 mL prn     ORDER FOR ALLERGEN IMMUNOTHERAPY Dog Hair-Dander, A. P.  1:100 w/v, HS  1.0 ml  Dust Mites DF. 10,000 AU/mL, HS  0.5 ml  Dust Mites DP. 10,000 AU/mL, HS  0.5 ml   Richard Grass 1:20 w/v, HS 0.5 ml  Bonilla Grass (Std) 100,000 BAU/mL, HS 0.4 ml  Diluent: HSA qs to 5ml 5 mL prn     ORDER FOR ALLERGEN IMMUNOTHERAPY Kochia 1:20 w/v, HS 0.5 ml  Lamb's Quarters 1:20 w/v, HS 0.5 ml  Nettle 1:20 w/v, HS 0.5 ml  Plantain, English 1:20 w/v, HS 0.5 ml  Ragweed, Mix (Giant, Short) 1:20 w/v, HS 0.5 ml  Russian Thistle 1:20 w/v, HS 0.5 ml  Sagebrush, Mugwort 1:20 w/v, HS 0.5 ml  Sorrel, Sheep 1:20 w/v, HS 0.5 ml  Diluent: HSA qs to 5ml 5 mL prn     SUMAtriptan (IMITREX) 50 MG tablet Take 1 tablet (50 mg) by mouth See Admin Instructions For ONSET OF MIGRAINE, MAY REPEAT ONCE AFTER 2 HRS. DO NOT EXCEED 4 TABLETS IN 24 HRS. 12 tablet 1     triamcinolone (NASACORT) 55 MCG/ACT nasal aerosol Spray 2 sprays into both nostrils daily 1 Bottle 11     UNABLE TO FIND Apply topically daily MEDICATION NAME: testosterone cream       UNABLE TO FIND Place 1 drop into both eyes daily MEDICATION NAME: Alaway 0.035%       VITAMIN D, CHOLECALCIFEROL, PO Take 10,000 Units by mouth daily        OBJECTIVE:  LMP 06/15/2015 (Approximate)    General: patient is in no apparent distress, alert and oriented times three.  Head normocephalic, hair normal  Skin normal.   Physical exam assessment done via video analysis: Nose straight nasal dorsum without any acquired deformity is noted.  Good tip support.  Capital maneuver was positive even though there was no obvious internal or external valve collapse.   Normal nares.  Midline columella.  Unfortunate could not look internally into the patient's nose.  Video exam.  Oral cavity Haile tongue position 2.  Tonsils surgically absent.  Normal soft palate and uvula.  Class I occlusion appreciated with dentition in good state of repair.    ASSESSMENT /PLAN:  Patient with a snoring excessive daytime sleepiness observed apneas likely has moderate to severe obstructive sleep apnea and therefore be a excellent candidate for home sleep study analysis.  We discussed with her the dangers of untreated sleep apnea complications related to long-term ADAM.  She understands those and wishes to undergo the evaluation.  Certainly would like to examine the patient in person in regard to special to the nasal obstruction.  We will arrange home sleep study and then follow-up with the patient afterwards.  Today spent 35 minutes with the patient in virtual counseling consulting.

## 2020-12-09 ENCOUNTER — ALLIED HEALTH/NURSE VISIT (OUTPATIENT)
Dept: ALLERGY | Facility: OTHER | Age: 50
End: 2020-12-09
Payer: COMMERCIAL

## 2020-12-09 DIAGNOSIS — J30.89 ALLERGIC RHINITIS DUE TO DUST MITE: ICD-10-CM

## 2020-12-09 DIAGNOSIS — J30.81 ALLERGIC RHINITIS DUE TO ANIMAL DANDER: ICD-10-CM

## 2020-12-09 DIAGNOSIS — Z51.6 NEED FOR DESENSITIZATION TO ALLERGENS: Primary | ICD-10-CM

## 2020-12-09 DIAGNOSIS — J30.1 CHRONIC SEASONAL ALLERGIC RHINITIS DUE TO POLLEN: ICD-10-CM

## 2020-12-09 DIAGNOSIS — J30.89 ALLERGIC RHINITIS DUE TO MOLD: ICD-10-CM

## 2020-12-09 PROCEDURE — 95117 IMMUNOTHERAPY INJECTIONS: CPT

## 2020-12-09 PROCEDURE — 99207 PR DROP WITH A PROCEDURE: CPT

## 2020-12-09 NOTE — PROGRESS NOTES
Patient presented after waiting 30 minutes with normal reaction to allergy injections. Discharged from clinic.    Carolee Cook RN Specialty Triage 12/9/2020 3:03 PM

## 2020-12-09 NOTE — TELEPHONE ENCOUNTER
ALLERGY SOLUTION RE-ORDER REQUEST    Sumaya Gatica 1970 MRN: [id number]    DATE NEEDED:  12/23/2020  Vial Color Content     Vial Size  Red 1:1 Trees     5ml  Red 1:1 Dog, Grass, Dust Mite   5ml  Red 1:1 Cat, Molds     5ml  Red 1:1 Weeds     5ml      Serum reorder consent signed and patient/parent was advised that new serums would be ordered through the pharmacy and billed to their insurance company when they arrive in clinic. Yes    [facility] Location:  [facility]  Ship to Location: [facility]  Serum billed to:  [facility]    Special Instructions:  None        Requester Signature  Ara Garza MA, CMA ......12/9/2020...2:50 PM

## 2020-12-23 DIAGNOSIS — J30.2 SEASONAL ALLERGIC RHINITIS: Primary | ICD-10-CM

## 2020-12-23 PROCEDURE — 95165 ANTIGEN THERAPY SERVICES: CPT | Performed by: ALLERGY & IMMUNOLOGY

## 2020-12-23 NOTE — PROGRESS NOTES
Allergy serums billed at [facility].     Vials billed below:    Vial Color Content                      Vial Size Expiration Date  Red 1:1 Trees  5mL  12/15/2021  Red 1:1 Weeds  5mL  12/15/2021  Red 1:1 Dog, Grass, Dust Mite 5mL  12/15/2021  Red 1:1 Cat, Molds  5mL  12/15/2021    Original Refill encounter date: 12/9/2020      Signature  Ara Garza MA, CMA ......12/23/2020...11:42 AM

## 2020-12-23 NOTE — TELEPHONE ENCOUNTER
Allergy serums received at [facility].     Vials received below:    Vial Color Content                       Vial Size Expiration Date  Red 1:1 Trees  5mL  12/15/2021  Red 1:1 Weeds  5mL  12/15/2021  Red 1:1 Dog, Grass, Dust Mite 5mL  12/15/2021  Red 1:1 Cat, Molds  5mL  12/15/2021      Signature  Ara Garza MA, CMA ......12/23/2020...11:41 AM

## 2021-01-06 ENCOUNTER — ALLIED HEALTH/NURSE VISIT (OUTPATIENT)
Dept: ALLERGY | Facility: OTHER | Age: 51
End: 2021-01-06
Payer: COMMERCIAL

## 2021-01-06 DIAGNOSIS — Z51.6 NEED FOR DESENSITIZATION TO ALLERGENS: Primary | ICD-10-CM

## 2021-01-06 DIAGNOSIS — Z53.9 NO SHOW: Primary | ICD-10-CM

## 2021-01-06 PROCEDURE — 95117 IMMUNOTHERAPY INJECTIONS: CPT

## 2021-01-06 PROCEDURE — 99207 PR DROP WITH A PROCEDURE: CPT

## 2021-01-06 NOTE — PROGRESS NOTES
Patient presented after waiting 30 minutes with normal reaction to  injections. Discharged from clinic.     Gladis COTTER RN, Allergy Clinic 01/06/21 3:31 PM

## 2021-01-10 ENCOUNTER — HEALTH MAINTENANCE LETTER (OUTPATIENT)
Age: 51
End: 2021-01-10

## 2021-01-13 ENCOUNTER — OFFICE VISIT (OUTPATIENT)
Dept: SLEEP MEDICINE | Facility: CLINIC | Age: 51
End: 2021-01-13
Payer: COMMERCIAL

## 2021-01-13 DIAGNOSIS — R06.83 SNORING: Primary | ICD-10-CM

## 2021-01-14 ENCOUNTER — DOCUMENTATION ONLY (OUTPATIENT)
Dept: SLEEP MEDICINE | Facility: CLINIC | Age: 51
End: 2021-01-14
Payer: COMMERCIAL

## 2021-01-14 NOTE — NURSING NOTE
Pt returned HST device. It was downloaded and forwarded data to the clinical specialist for scoring.    HST POST-STUDY QUESTIONNAIRE    1. What time did you go to bed?  9:40  2. How long do you think it took to fall asleep?  5min  3. What time did you wake up to start the day?  5am  4. Did you get up during the night at all?  no  5. If you woke up, do you remember approximately what time(s)? Couple times unsure when   6. Did you have any difficulty with the equipment?  No  7. Did you us any type of treatment with this study?  None  8. Was the head of the bed elevated? No  9. Did you sleep in a recliner?  No  10. Did you stop using CPAP at least 3 days before this test?  NA  11. Any other information you'd like us to know? NA

## 2021-01-15 NOTE — PROGRESS NOTES
HST POST-STUDY QUESTIONNAIRE    1. What time did you go to bed?  9:40  2. How long do you think it took to fall asleep?  5 minutes  3. What time did you wake up to start the day?  5:00 AM   4. Did you get up during the night at all?  No   5. If you woke up, do you remember approximately what time(s)?   6. Did you have any difficulty with the equipment?  Yes Wrist monitor didn't always show a number.  7. Did you us any type of treatment with this study?  None  8. Was the head of the bed elevated? No  9. Did you sleep in a recliner?  No  10. Did you stop using CPAP at least 3 days before this test?  NA  11. Any other information you'd like us to know? no

## 2021-01-21 DIAGNOSIS — E55.9 VITAMIN D DEFICIENCY DISEASE: Primary | ICD-10-CM

## 2021-01-21 DIAGNOSIS — D64.9 ANEMIA, UNSPECIFIED: ICD-10-CM

## 2021-01-21 DIAGNOSIS — D51.9 VITAMIN B12 DEFICIENCY ANEMIA: ICD-10-CM

## 2021-01-22 DIAGNOSIS — F41.9 ANXIETY: ICD-10-CM

## 2021-01-22 NOTE — PROGRESS NOTES
Does Sumaya have a CPAP/Bipap?  No         [place] Sleep Scale: 5    Sumaya is a 50 year old who is being evaluated via a billable telephone visit.      What phone number would you like to be contacted at? [phone]   How would you like to obtain your AVS? Paty Espinoza     Sumaya is a 50 year old who presents to clinic today for the following health issues Sleep study results.      Vitals:  No vitals were obtained today due to virtual visit.            Phone call duration:  minutes      Sleep Study Follow-Up Visit:    Date on this visit: 1/26/2021    Sumaya Gatica comes in today for follow-up of her sleep study done on *** at the [facility]EPL[facility]019[facility] for {SLEEP PROBLEMS ADULT:265592}.    Sleep latency {5-50 BY 5:636326} minutes {WITH/WITHOUT (NO DEFAULT):674377} Ambien.  REM {REM SLEEP:045430}.   REM latency *** minutes.  Sleep efficiency ***%. Total sleep time *** minutes.    Sleep architecture:  Stage 1, ***% (5%), stage 2, ***% (45-55%), stage 3, ***% (15-20%), stage REM, ***% (20-25%).  AHI was ***, {WITH/OUT:274367} {DESATURATION:945901}. RDI ***.  REM RDI ***, consistent with {MILD/MODERATE/SEVERE:895502} REM ADAM.  Supine RDI ***, consistent with {MILD/MODERATE/SEVERE:273562} SUPINE ADAM.  Periodic Limb Movement Index ***/hour.       CPAP titration:  Sleep latency {5-50 BY 5:566696} minutes.  REM latency *** minutes.  Sleep efficiency ***%. Total sleep time *** minutes. Sleep architecture:  Stage 1, ***% (5%), stage 2, ***% (45-55%), stage 3, ***% (15-20%), stage REM, ***% (20-25%).  CPAP was titrated to *** cm with *** elimination of apneas, hypopneas and desaturations. Supine REM {WAS / WAS NO:880311} noted at this pressure.  Periodic Limb Movement Index ***/hour.       These findings were reviewed with {PATIENT/MD:740789}.     Past medical/surgical history, family history, social history, medications and allergies were reviewed.      Problem List:  Patient Active Problem  "List    Diagnosis Date Noted    Allergic rhinitis due to mold 03/03/2020     Priority: Medium    Allergic rhinitis due to dust mite 03/03/2020     Priority: Medium    Allergic rhinitis due to animal dander 03/03/2020     Priority: Medium    Chronic seasonal allergic rhinitis due to pollen 03/27/2018     Priority: Medium    Tree nut allergy 03/27/2018     Priority: Medium    Shellfish allergy 03/27/2018     Priority: Medium    Insomnia, unspecified type 05/25/2016     Priority: Medium    Uterine prolapse 11/24/2015     Priority: Medium    Female stress incontinence 06/01/2015     Priority: Medium    Urinary urgency 06/01/2015     Priority: Medium    Migraine without aura and without status migrainosus, not intractable 05/21/2015     Priority: Medium    Acne vulgaris 10/28/2011     Priority: Medium    Rosacea 10/14/2011     Priority: Medium    Dermatitis 12/01/2010     Priority: Medium    CARDIOVASCULAR SCREENING; LDL GOAL LESS THAN 160 10/31/2010     Priority: Medium    Anxiety 12/09/2009     Priority: Medium    Lumbago 09/11/2007     Priority: Medium    Abnormal glandular Papanicolaou smear of cervix 04/23/2007     Priority: Medium     3/22/07 ASC-H  4/23/07 NIL/ neg HPV/ ECC (negative)  12/6/07 NIL  4/17/08 NIL  4/23/09 NIL  7/23/10 NIL      Allergic rhinitis 03/26/2007     Priority: Medium     Problem list name updated by automated process. Provider to review      Herpes simplex virus (HSV) infection 03/26/2007     Priority: Medium     Problem list name updated by automated process. Provider to review          Impression/Plan:    ***  She will follow up with me in about {NUMBERS 1-12:157236::\"2\"} {WEEKS/MONTHS:060844}.     {TIMES:861996} minutes spent with patient, all of which were spent face-to-face counseling, consulting, coordinating plan of care.      ***    CC: Tiffanie Montes     "

## 2021-01-25 RX ORDER — ESCITALOPRAM OXALATE 10 MG/1
TABLET ORAL
Qty: 30 TABLET | Refills: 1 | OUTPATIENT
Start: 2021-01-25

## 2021-01-25 RX ORDER — ESCITALOPRAM OXALATE 10 MG/1
TABLET ORAL
Qty: 30 TABLET | Refills: 1 | Status: SHIPPED | OUTPATIENT
Start: 2021-01-25 | End: 2021-02-02

## 2021-01-25 NOTE — TELEPHONE ENCOUNTER
Prescription approved per [facility] Refill Protocol.  Duplicate request.   Jyoti Gates RN, BSN  [facility]  January 25, 2021

## 2021-01-26 ENCOUNTER — VIRTUAL VISIT (OUTPATIENT)
Dept: SLEEP MEDICINE | Facility: CLINIC | Age: 51
End: 2021-01-26
Payer: COMMERCIAL

## 2021-01-26 ENCOUNTER — E-VISIT (OUTPATIENT)
Dept: FAMILY MEDICINE | Facility: OTHER | Age: 51
End: 2021-01-26
Payer: COMMERCIAL

## 2021-01-26 DIAGNOSIS — R06.81 APNEA: ICD-10-CM

## 2021-01-26 DIAGNOSIS — G47.19 EXCESSIVE DAYTIME SLEEPINESS: ICD-10-CM

## 2021-01-26 DIAGNOSIS — R06.83 SNORING: Primary | ICD-10-CM

## 2021-01-26 DIAGNOSIS — F41.9 ANXIETY: Primary | ICD-10-CM

## 2021-01-26 PROCEDURE — 99421 OL DIG E/M SVC 5-10 MIN: CPT | Performed by: PHYSICIAN ASSISTANT

## 2021-01-26 ASSESSMENT — ANXIETY QUESTIONNAIRES
1. FEELING NERVOUS, ANXIOUS, OR ON EDGE: NEARLY EVERY DAY
3. WORRYING TOO MUCH ABOUT DIFFERENT THINGS: NEARLY EVERY DAY
7. FEELING AFRAID AS IF SOMETHING AWFUL MIGHT HAPPEN: SEVERAL DAYS
6. BECOMING EASILY ANNOYED OR IRRITABLE: MORE THAN HALF THE DAYS
GAD7 TOTAL SCORE: 16
2. NOT BEING ABLE TO STOP OR CONTROL WORRYING: NEARLY EVERY DAY
5. BEING SO RESTLESS THAT IT IS HARD TO SIT STILL: MORE THAN HALF THE DAYS
4. TROUBLE RELAXING: MORE THAN HALF THE DAYS
GAD7 TOTAL SCORE: 16
7. FEELING AFRAID AS IF SOMETHING AWFUL MIGHT HAPPEN: SEVERAL DAYS

## 2021-01-27 ASSESSMENT — ANXIETY QUESTIONNAIRES: GAD7 TOTAL SCORE: 16

## 2021-02-01 DIAGNOSIS — F41.9 ANXIETY: ICD-10-CM

## 2021-02-02 ENCOUNTER — ALLIED HEALTH/NURSE VISIT (OUTPATIENT)
Dept: ALLERGY | Facility: OTHER | Age: 51
End: 2021-02-02
Payer: COMMERCIAL

## 2021-02-02 DIAGNOSIS — Z51.6 NEED FOR DESENSITIZATION TO ALLERGENS: Primary | ICD-10-CM

## 2021-02-02 PROCEDURE — 95117 IMMUNOTHERAPY INJECTIONS: CPT

## 2021-02-02 PROCEDURE — 99207 PR DROP WITH A PROCEDURE: CPT

## 2021-02-02 RX ORDER — ESCITALOPRAM OXALATE 10 MG/1
TABLET ORAL
Qty: 30 TABLET | Refills: 1 | Status: SHIPPED | OUTPATIENT
Start: 2021-02-02 | End: 2021-04-07

## 2021-02-02 NOTE — PROGRESS NOTES
Patient presented after waiting 30 minutes with no reaction to allergy injections. Discharged from clinic.      Carolee ANDERSON RN Specialty Triage 2/2/2021 3:47 PM

## 2021-02-02 NOTE — TELEPHONE ENCOUNTER
Prescription approved per [facility] [place] [facility].  Jyoti Gates RN, BSN  [facility]  February 2, 2021

## 2021-02-04 DIAGNOSIS — J30.1 CHRONIC SEASONAL ALLERGIC RHINITIS DUE TO POLLEN: ICD-10-CM

## 2021-02-04 RX ORDER — MONTELUKAST SODIUM 10 MG/1
10 TABLET ORAL AT BEDTIME
Qty: 30 TABLET | Refills: 6 | Status: SHIPPED | OUTPATIENT
Start: 2021-02-04 | End: 2021-07-27

## 2021-02-04 NOTE — TELEPHONE ENCOUNTER
Signed Prescriptions:                        Disp   Refills    montelukast (SINGULAIR) 10 MG tablet       30 tab*6        Sig: Take 1 tablet (10 mg) by mouth At Bedtime  Authorizing Provider: TREMAINE MORALES  Ordering User: ÁLVARO CARRILLO RN refilled medication per [facility] Refill Protocol.     Álvaro Carrillo RN

## 2021-02-15 ENCOUNTER — OFFICE VISIT (OUTPATIENT)
Dept: SLEEP MEDICINE | Facility: CLINIC | Age: 51
End: 2021-02-15
Payer: COMMERCIAL

## 2021-02-15 DIAGNOSIS — G47.19 EXCESSIVE DAYTIME SLEEPINESS: ICD-10-CM

## 2021-02-15 DIAGNOSIS — R06.81 APNEA: ICD-10-CM

## 2021-02-15 DIAGNOSIS — R06.83 SNORING: ICD-10-CM

## 2021-02-15 PROCEDURE — G0399 HOME SLEEP TEST/TYPE 3 PORTA: HCPCS | Performed by: OTOLARYNGOLOGY

## 2021-02-16 ENCOUNTER — ALLIED HEALTH/NURSE VISIT (OUTPATIENT)
Dept: ALLERGY | Facility: OTHER | Age: 51
End: 2021-02-16
Payer: COMMERCIAL

## 2021-02-16 ENCOUNTER — DOCUMENTATION ONLY (OUTPATIENT)
Dept: SLEEP MEDICINE | Facility: CLINIC | Age: 51
End: 2021-02-16
Payer: COMMERCIAL

## 2021-02-16 DIAGNOSIS — J30.9 ALLERGIC RHINITIS: ICD-10-CM

## 2021-02-16 DIAGNOSIS — J30.89 ALLERGIC RHINITIS DUE TO DUST MITE: ICD-10-CM

## 2021-02-16 DIAGNOSIS — J30.81 ALLERGIC RHINITIS DUE TO ANIMAL DANDER: ICD-10-CM

## 2021-02-16 DIAGNOSIS — J30.89 SEASONAL ALLERGIC RHINITIS DUE TO OTHER ALLERGIC TRIGGER: Primary | ICD-10-CM

## 2021-02-16 DIAGNOSIS — J30.89 ALLERGIC RHINITIS DUE TO MOLD: ICD-10-CM

## 2021-02-16 PROCEDURE — 95117 IMMUNOTHERAPY INJECTIONS: CPT

## 2021-02-16 PROCEDURE — 99207 PR DROP WITH A PROCEDURE: CPT

## 2021-02-16 NOTE — PROGRESS NOTES
Patient presented after waiting 30 minutes with no reaction to allergy injections. Discharged from clinic.    Carolee ANDERSON RN Specialty Triage 2/16/2021 3:44 PM

## 2021-02-16 NOTE — NURSING NOTE
Pt returned HST device. It was downloaded and forwarded data to the clinical specialist for scoring.      HST POST-STUDY QUESTIONNAIRE    1. What time did you go to bed?  8:40pm  2. How long do you think it took to fall asleep?  Couple min  3. What time did you wake up to start the day?  4:30  4. Did you get up during the night at all?  No   5. If you woke up, do you remember approximately what time(s)? NA   6. Did you have any difficulty with the equipment?  No  7. Did you us any type of treatment with this study?  None  8. Was the head of the bed elevated? No  9. Did you sleep in a recliner?  No  10. Did you stop using CPAP at least 3 days before this test?  NA  11. Any other information you'd like us to know? NA

## 2021-02-18 NOTE — PROGRESS NOTES
"HOME SLEEP STUDY INTERPRETATION    Patient: Sumaya Gatiac  MRN: [id number]  YOB: 1970  Study Date: 2/15/2021  Referring Provider: Marleny Montes  Ordering Provider: Denis Bowden MD     Indications for Home Study: Sumaya Gatica is a 50 year old female with a history of snoring, excessive daytime sleepiness, apneas who presents with symptoms suggestive of obstructive sleep apnea.    Estimated body mass index is 25.84 kg/m  as calculated from the following:    Height as of 10/20/20: 1.702 m (5' 7\").    Weight as of 10/20/20: 74.8 kg (165 lb).  [place] Sleepiness Scale:   STOP-BAN/8    Data: A full night home sleep study was performed recording the standard physiologic parameters including body position, movement, sound, nasal pressure, thermal oral airflow, chest and abdominal movements with respiratory inductance plethysmography, and oxygen saturation by pulse oximetry. Pulse rate was estimated by oximetry recording. This study was considered adequate based on > 4 hours of quality oximetry and respiratory recording. As specified by the AASM Manual for the Scoring of Sleep and Associated events, version 2.3, Rule VIII.D 1B, 4% oxygen desaturation scoring for hypopneas is used as a standard of care on all home sleep apnea testing.    Analysis Time:  417 minutes    Respiration:   Sleep Associated Hypoxemia: sustained hypoxemia was present. Baseline oxygen saturation was 92.9%.  Time with saturation less than or equal to 88% was 17 minutes. The lowest oxygen saturation was 81%.   Snoring: Snoring was present.  Respiratory events: The home study revealed a presence of 157 obstructive apneas and 2 mixed and central apneas. There were 92 hypopneas resulting in a combined apnea/hypopnea index [AHI] of 35.8 events per hour.  AHI was 49.2 per hour supine, 0 per hour prone, 19.6 per hour on left side, and 30.7 per hour on right side.   Pattern: Excluding events noted above, respiratory " rate and pattern was Normal.    Position: Percent of time spent: supine - 42.3%, prone - 0%, on left - 24.8%, on right - 32.9%.    Heart Rate: By pulse oximetry tachycardia was noted.     Assessment:   Severe obstructive sleep apnea.  Sleep associated hypoxemia was present.    Recommendations:  Consider auto-CPAP at 5-15 cmH2O or oral appliance therapy.  (Only if patient were to fail CPAP or is part of hybrid therapy with CPAP).  Suggest optimizing sleep hygiene and avoiding sleep deprivation.      Diagnosis Code(s): Obstructive Sleep Apnea G47.33, Hypoxemia G47.36    Denis Bowden MD, February 18, 2021   Diplomate, American Board of Otolaryngology, Sleep Medicine

## 2021-02-18 NOTE — PROGRESS NOTES
This HSAT was performed using a Noxturnal T3 device which recorded snore, sound, movement activity, body position, nasal pressure, oronasal thermal airflow, pulse, oximetry and both chest and abdominal respiratory effort. HSAT data was restricted to the time patient states they were in bed.     HSAT was scored using 1B 4% hypopnea rule.     AHI: 35.8. Snoring was reported as loud.  Time with SpO2 below 89% was 17 minutes.   Overall signal quality was good     Pt will follow up with sleep provider to determine appropriate therapy.     Ordering Provider, Denis Bowden MD Charles O.BA, [facility], RST System Clinical Specialist 02/18/2021

## 2021-03-02 ENCOUNTER — ALLIED HEALTH/NURSE VISIT (OUTPATIENT)
Dept: ALLERGY | Facility: OTHER | Age: 51
End: 2021-03-02
Payer: COMMERCIAL

## 2021-03-02 DIAGNOSIS — Z51.6 NEED FOR DESENSITIZATION TO ALLERGENS: Primary | ICD-10-CM

## 2021-03-02 PROCEDURE — 99207 PR DROP WITH A PROCEDURE: CPT

## 2021-03-02 PROCEDURE — 95117 IMMUNOTHERAPY INJECTIONS: CPT

## 2021-03-02 NOTE — PROGRESS NOTES
Patient presented after waiting 30 minutes with no reaction to allergy injections. Discharged from clinic.    Pallavi Carrillo RN

## 2021-03-03 NOTE — PROGRESS NOTES
Does Sumaya have a CPAP/Bipap?  No     [place] Sleep Scale: 7    Sumaya is a 50 year old who is being evaluated via a billable telephone visit.      What phone number would you like to be contacted at? [phone]  How would you like to obtain your AVS? Paty Espinoza   Sumaya is a 50 year old who presents for the following health issues HST results     Vitals:  No vitals were obtained today due to virtual visit.            Phone call duration: 22 minutes      Sleep Study Follow-Up Visit:    Date on this visit: 3/4/2021    Sumaya Gatica comes in today for follow-up of her home sleep study done on 2/15/2021 at the [facility] for loud snoring, excessive daytime sleepiness and possible sleep apnea.      Data: A full night home sleep study was performed recording the standard physiologic parameters including body position, movement, sound, nasal pressure, thermal oral airflow, chest and abdominal movements with respiratory inductance plethysmography, and oxygen saturation by pulse oximetry. Pulse rate was estimated by oximetry recording. This study was considered adequate based on > 4 hours of quality oximetry and respiratory recording. As specified by the AASM Manual for the Scoring of Sleep and Associated events, version 2.3, Rule VIII.D 1B, 4% oxygen desaturation scoring for hypopneas is used as a standard of care on all home sleep apnea testing.     Analysis Time:  417 minutes     Respiration:   Sleep Associated Hypoxemia: sustained hypoxemia was present. Baseline oxygen saturation was 92.9%.  Time with saturation less than or equal to 88% was 17 minutes. The lowest oxygen saturation was 81%.   Snoring: Snoring was present.  Respiratory events: The home study revealed a presence of 157 obstructive apneas and 2 mixed and central apneas. There were 92 hypopneas resulting in a combined apnea/hypopnea index [AHI] of 35.8 events per hour.  AHI was 49.2 per hour supine, 0 per hour  prone, 19.6 per hour on left side, and 30.7 per hour on right side.   Pattern: Excluding events noted above, respiratory rate and pattern was Normal.     Position: Percent of time spent: supine - 42.3%, prone - 0%, on left - 24.8%, on right - 32.9%.     Heart Rate: By pulse oximetry tachycardia was noted.      Assessment:   Severe obstructive sleep apnea.  Sleep associated hypoxemia was present.     Recommendations:  Consider auto-CPAP at 5-15 cmH2O or oral appliance therapy.  (Only if patient were to fail CPAP or is part of hybrid therapy with CPAP).  Suggest optimizing sleep hygiene and avoiding sleep deprivation.     These findings were reviewed with patient.     Past medical/surgical history, family history, social history, medications and allergies were reviewed.      Problem List:  Patient Active Problem List    Diagnosis Date Noted     Allergic rhinitis due to mold 03/03/2020     Priority: Medium     Allergic rhinitis due to dust mite 03/03/2020     Priority: Medium     Allergic rhinitis due to animal dander 03/03/2020     Priority: Medium     Chronic seasonal allergic rhinitis due to pollen 03/27/2018     Priority: Medium     Tree nut allergy 03/27/2018     Priority: Medium     Shellfish allergy 03/27/2018     Priority: Medium     Insomnia, unspecified type 05/25/2016     Priority: Medium     Uterine prolapse 11/24/2015     Priority: Medium     Female stress incontinence 06/01/2015     Priority: Medium     Urinary urgency 06/01/2015     Priority: Medium     Migraine without aura and without status migrainosus, not intractable 05/21/2015     Priority: Medium     Acne vulgaris 10/28/2011     Priority: Medium     Rosacea 10/14/2011     Priority: Medium     Dermatitis 12/01/2010     Priority: Medium     CARDIOVASCULAR SCREENING; LDL GOAL LESS THAN 160 10/31/2010     Priority: Medium     Anxiety 12/09/2009     Priority: Medium     Lumbago 09/11/2007     Priority: Medium     Abnormal glandular Papanicolaou smear  of cervix 04/23/2007     Priority: Medium     3/22/07 ASC-H  4/23/07 NIL/ neg HPV/ ECC (negative)  12/6/07 NIL  4/17/08 NIL  4/23/09 NIL  7/23/10 NIL       Allergic rhinitis 03/26/2007     Priority: Medium     Problem list name updated by automated process. Provider to review       Herpes simplex virus (HSV) infection 03/26/2007     Priority: Medium     Problem list name updated by automated process. Provider to review          Impression/Plan:    We discussed nature of severe obstructive sleep apnea with associated hypoxemia risks of untreated ADAM.  We discussed treatment options certainly CPAP being gold standard but possibility of dental appliance consideration.  She is interested in starting with AutoPap using nasal pillows.  Therefore we will set pressure range 5 to 15 cm of water.  She will follow up with me in about 5 week(s).     22 minutes spent with patient, all of which were spent in virtual counseling, consulting, coordinating plan of care.      Denis Bowden MD      CC: Tiffanie Montes

## 2021-03-04 ENCOUNTER — VIRTUAL VISIT (OUTPATIENT)
Dept: SLEEP MEDICINE | Facility: CLINIC | Age: 51
End: 2021-03-04
Payer: COMMERCIAL

## 2021-03-04 DIAGNOSIS — G47.33 OSA (OBSTRUCTIVE SLEEP APNEA): Primary | ICD-10-CM

## 2021-03-04 PROCEDURE — 99213 OFFICE O/P EST LOW 20 MIN: CPT | Mod: 95 | Performed by: OTOLARYNGOLOGY

## 2021-03-07 ENCOUNTER — MYC MEDICAL ADVICE (OUTPATIENT)
Dept: FAMILY MEDICINE | Facility: OTHER | Age: 51
End: 2021-03-07

## 2021-03-08 ENCOUNTER — E-VISIT (OUTPATIENT)
Dept: FAMILY MEDICINE | Facility: OTHER | Age: 51
End: 2021-03-08
Payer: COMMERCIAL

## 2021-03-08 ENCOUNTER — MYC MEDICAL ADVICE (OUTPATIENT)
Dept: FAMILY MEDICINE | Facility: OTHER | Age: 51
End: 2021-03-08

## 2021-03-08 DIAGNOSIS — R05.9 COUGH: Primary | ICD-10-CM

## 2021-03-08 DIAGNOSIS — J02.9 SORE THROAT: ICD-10-CM

## 2021-03-08 PROCEDURE — 99421 OL DIG E/M SVC 5-10 MIN: CPT | Performed by: PHYSICIAN ASSISTANT

## 2021-03-13 ENCOUNTER — HEALTH MAINTENANCE LETTER (OUTPATIENT)
Age: 51
End: 2021-03-13

## 2021-03-15 DIAGNOSIS — G43.009 MIGRAINE WITHOUT AURA AND WITHOUT STATUS MIGRAINOSUS, NOT INTRACTABLE: ICD-10-CM

## 2021-03-16 RX ORDER — SUMATRIPTAN 50 MG/1
50 TABLET, FILM COATED ORAL SEE ADMIN INSTRUCTIONS
Qty: 12 TABLET | Refills: 1 | Status: SHIPPED | OUTPATIENT
Start: 2021-03-16 | End: 2021-06-21

## 2021-03-16 NOTE — TELEPHONE ENCOUNTER
Prescription approved per [facility] Refill Protocol.    Nettie Palma RN on 3/16/2021 at 3:04 PM

## 2021-03-23 ENCOUNTER — ALLIED HEALTH/NURSE VISIT (OUTPATIENT)
Dept: ALLERGY | Facility: OTHER | Age: 51
End: 2021-03-23
Payer: COMMERCIAL

## 2021-03-23 ENCOUNTER — DOCUMENTATION ONLY (OUTPATIENT)
Dept: SLEEP MEDICINE | Facility: CLINIC | Age: 51
End: 2021-03-23
Payer: COMMERCIAL

## 2021-03-23 DIAGNOSIS — J30.9 ALLERGIC RHINITIS: ICD-10-CM

## 2021-03-23 DIAGNOSIS — J30.89 ALLERGIC RHINITIS DUE TO DUST MITE: ICD-10-CM

## 2021-03-23 DIAGNOSIS — J30.89 ALLERGIC RHINITIS DUE TO MOLD: Primary | ICD-10-CM

## 2021-03-23 DIAGNOSIS — J30.81 ALLERGIC RHINITIS DUE TO ANIMAL DANDER: ICD-10-CM

## 2021-03-23 PROCEDURE — 99207 PR DROP WITH A PROCEDURE: CPT

## 2021-03-23 PROCEDURE — 95117 IMMUNOTHERAPY INJECTIONS: CPT

## 2021-03-23 NOTE — PROGRESS NOTES
Patient presented after waiting 30 minutes with normal reaction to allergy injections. Discharged from clinic.    Carolee ANDERSON RN Specialty Triage 3/23/2021 1:27 PM

## 2021-03-23 NOTE — PROGRESS NOTES
Patient was offered choice of vendor and chose [facility].  Patient Sumaya Gatica was set up at [place] on March 23, 2021. Patient received a Resmed AirSense 10 Auto. Pressures were set at 5-15 cm H2O.   Patient s ramp is 5 cm H2O for Off and FLEX/EPR is EPR.  Patient received a Resmed Mask name: AIRFIT F30I  Full Face mask size Medium, heated tubing and heated humidifier.  Patient does not need to meet compliance. Patient has a follow up on 4/29/21 with Dr. Bowden.    Lesly Sutherland

## 2021-03-26 ENCOUNTER — DOCUMENTATION ONLY (OUTPATIENT)
Dept: SLEEP MEDICINE | Facility: CLINIC | Age: 51
End: 2021-03-26

## 2021-03-26 NOTE — PROGRESS NOTES
3 DAY STM VISIT    Diagnostic AHI:   35.8  HST    Patient contacted for 3 day STM visit    Confirmed with patient at time of call- N/A Patient is still interested in STM service     Subjective measures:  First night was awesome.  She is feeling benefit.  She was swallowing air last night.  She is working on mask fit.     Replacement device: No  STM ordered by provider: Yes     Device type: Auto-CPAP  PAP settings from order::  CPAP min 5 cm  H20       CPAP max 15 cm  H20        Mask type:    Nasal Pillows     Device settings from machine      Min CPAP 5.0            Max CPAP 15.0               EPR level Setting: TWO      RESMED Soft response setting:  OFF  Assessment: Nightly usage over four hours.  Action plan: Patient to have 14 day STM visit. Patient has a follow up visit scheduled:   yes within 31-90 days of set up.     Total time spent on accessing and  interpreting remote patient PAP therapy data  10 minutes  Total time spent counseling, coaching  and reviewing PAP therapy data with patient 3  minutes  [id number] no

## 2021-04-05 ENCOUNTER — MYC MEDICAL ADVICE (OUTPATIENT)
Dept: FAMILY MEDICINE | Facility: OTHER | Age: 51
End: 2021-04-05

## 2021-04-05 DIAGNOSIS — Z12.31 ENCOUNTER FOR SCREENING MAMMOGRAM FOR BREAST CANCER: Primary | ICD-10-CM

## 2021-04-05 DIAGNOSIS — N64.4 BREAST PAIN: ICD-10-CM

## 2021-04-06 NOTE — TELEPHONE ENCOUNTER
Routing to provider-    Do not see order in for this, please order and let patient know.     SKYLAR Basurto/[facility]

## 2021-04-07 ENCOUNTER — DOCUMENTATION ONLY (OUTPATIENT)
Dept: SLEEP MEDICINE | Facility: CLINIC | Age: 51
End: 2021-04-07
Payer: COMMERCIAL

## 2021-04-07 NOTE — PROGRESS NOTES
14  DAY STM VISIT    Diagnostic AHI:  35.8 HST    Subjective measures:   Patient states things are going pretty good.  Patient feels better.  Patient has a small nose and is wondering if a different mask would work.  Will reach out to [facility].     Assessment: Pt meeting objective benchmarks.  Patient failing following subjective benchmarks: mask discomfort. Sent patient a copy of her sleep study.    Action plan: pt to have 30 day STM visit.      Device type: Auto-CPAP    PAP settings: CPAP min 5.0 cm  H20       CPAP max 15.0 cm  H20      95th% pressure 14.4 cm  H20        RESMED EPR level Setting: TWO    RESMED Soft response setting:  OFF    Mask type:  Nasal Pillows    Objective measures: 14 day rolling measures      Compliance  85 %      Leak  17.4  lpm  last  upload      AHI 3.76   last  upload      Average number of minutes 382      Objective measure goal  Compliance   Goal >70%  Leak   Goal < 24 lpm  AHI  Goal < 5  Usage  Goal >240        Total time spent on accessing and interpreting remote patient PAP therapy data  10 minutes    Total time spent counseling, coaching  and reviewing PAP therapy data with patient  6 minutes    [id number]  [id number]  no (3 day STM)

## 2021-04-15 DIAGNOSIS — J30.1 CHRONIC SEASONAL ALLERGIC RHINITIS DUE TO POLLEN: ICD-10-CM

## 2021-04-15 RX ORDER — FEXOFENADINE HCL AND PSEUDOEPHEDRINE HCL 180; 240 MG/1; MG/1
1 TABLET, EXTENDED RELEASE ORAL DAILY
Qty: 30 TABLET | Refills: 11 | Status: SHIPPED | OUTPATIENT
Start: 2021-04-15 | End: 2021-10-22

## 2021-04-15 NOTE — TELEPHONE ENCOUNTER
Pending Prescriptions:                       Disp   Refills    fexofenadine-pseudoePHEDrine (ALLEGRA-D 2*30 tab*11           Sig: Take 1 tablet by mouth daily    Routing refill request to provider for review/approval because:  Drug not on the [facility] refill protocol     Pallavi Carrillo RN

## 2021-04-21 ENCOUNTER — ALLIED HEALTH/NURSE VISIT (OUTPATIENT)
Dept: ALLERGY | Facility: OTHER | Age: 51
End: 2021-04-21
Payer: COMMERCIAL

## 2021-04-21 DIAGNOSIS — J30.81 ALLERGIC RHINITIS DUE TO ANIMAL DANDER: ICD-10-CM

## 2021-04-21 DIAGNOSIS — J30.9 ALLERGIC RHINITIS: ICD-10-CM

## 2021-04-21 DIAGNOSIS — J30.89 ALLERGIC RHINITIS DUE TO MOLD: Primary | ICD-10-CM

## 2021-04-21 DIAGNOSIS — J30.89 ALLERGIC RHINITIS DUE TO DUST MITE: ICD-10-CM

## 2021-04-21 PROCEDURE — 99207 PR DROP WITH A PROCEDURE: CPT

## 2021-04-21 PROCEDURE — 95117 IMMUNOTHERAPY INJECTIONS: CPT

## 2021-04-21 NOTE — PROGRESS NOTES
Patient presented after waiting 30 minutes with normal reaction to allergy injections. Discharged from clinic.      Carolee ANDERSON RN Specialty Triage 4/21/2021 2:19 PM

## 2021-04-23 ENCOUNTER — DOCUMENTATION ONLY (OUTPATIENT)
Dept: SLEEP MEDICINE | Facility: CLINIC | Age: 51
End: 2021-04-23

## 2021-04-23 NOTE — PROGRESS NOTES
30 DAY STM VISIT    Diagnostic AHI:   35.8  HST    Subjective measures:  She is still working on mask fit and is exploring other mask styles.     Assessment: Pt not meeting objective benchmarks for AHI and leak Patient failing following subjective benchmarks: leak issues  and mask discomfort   Action plan:   Patient has scheduled a follow up visit with Dr. Bowden     Device type: Auto-CPAP  PAP settings: CPAP min 5.0 cm  H20     CPAP max 15.0 cm  H20    95th% pressure 14.1 cm  H20      RESMED EPR level Setting: TWO    RESMED Soft response setting:  OFF  Mask type:  Nasal Pillows  Objective measures: 14 day rolling measures      Compliance  71 %      Leak  24.92 lpm  last  upload      AHI 5.73   last  Upload elevated due to leak on some nights      Average number of minutes 289      Objective measure goal  Compliance   Goal >70%  Leak   Goal < 24 lpm  AHI  Goal < 5  Usage  Goal >240        Total time spent on accessing and interpreting remote patient PAP therapy data  10 minutes    Total time spent counseling, coaching  and reviewing PAP therapy data with patient  4 minutes     [id number] this call  [id number] no  at 3 or 14 day RUST

## 2021-05-04 DIAGNOSIS — F41.9 ANXIETY: ICD-10-CM

## 2021-05-04 NOTE — TELEPHONE ENCOUNTER
Pending Prescriptions:                       Disp   Refills    escitalopram (LEXAPRO) 10 MG tablet [Pharm*30 tab*0        Sig: TAKE 1 TABLET(10 MG) BY MOUTH DAILY    Routing refill request to provider for review/approval because:  Xenia given x1 and patient did not follow up, please advise

## 2021-05-05 NOTE — TELEPHONE ENCOUNTER
Overdue for visit, may do in person or virtual, please help her set this up I can refill short supply if she will run out before visit  Tiffanie Montes PA-C

## 2021-05-05 NOTE — PROGRESS NOTES
Does Sumaya have a CPAP/Bipap?  Yes     Type of mask:nasal pillows     [facility]: [place] [phone]    [email or web address]     [place] Sleep Scale: 6     Sumaya is a 50 year old who is being evaluated via a billable telephone visit.    What phone number would you like to be contacted at? [phone]   How would you like to obtain your AVS? MyChart    Subjective   Sumaya is a 50 year old who presents for the following health issues CPAP follow up     Vitals:  No vitals were obtained today due to virtual visit.  Phone call duration: 15 minutes      Obstructive Sleep Apnea - PAP Follow-Up Visit:    Chief Complaint   Patient presents with     CPAP Follow Up       Sumaya Gatica comes in today for follow-up of their severe sleep apnea, managed with CPAP.     No specialty comments available.    Overall, she rates the experience with PAP as 8 (0 poor, 10 great). The mask is comfortable.  She is using full facemask and actually changed strap to shorter one to avoid further leak .  She is not snoring with the mask on. She is not having gasp arousals.  She is not having significant oral/nasal dryness. The pressure is comfortable.   Her PAP interface is Full Face Mask.     The patient is usually getting 8 hours of sleep per night.    She does feel rested in the morning.    [place] Sleepiness Scale: 6/24      No data recorded    ResMed       Auto-PAP 5.0 - 15.0 cmH2O 30 day usage data:    70% of days with > 4 hours of use. 5/30 days with no use.   Average use 280 minutes per day.   95%ile Leak 16.51 L/min.   CPAP 95% pressure 13.5 cm.   AHI 4.76 events per hour.         Past medical/surgical history, family history, social history, medications and allergies were reviewed.      Problem List:  Patient Active Problem List    Diagnosis Date Noted     Allergic rhinitis due to mold 03/03/2020     Priority: Medium     Allergic rhinitis due to dust mite 03/03/2020     Priority: Medium      Allergic rhinitis due to animal dander 03/03/2020     Priority: Medium     Chronic seasonal allergic rhinitis due to pollen 03/27/2018     Priority: Medium     Tree nut allergy 03/27/2018     Priority: Medium     Shellfish allergy 03/27/2018     Priority: Medium     Insomnia, unspecified type 05/25/2016     Priority: Medium     Uterine prolapse 11/24/2015     Priority: Medium     Female stress incontinence 06/01/2015     Priority: Medium     Urinary urgency 06/01/2015     Priority: Medium     Migraine without aura and without status migrainosus, not intractable 05/21/2015     Priority: Medium     Acne vulgaris 10/28/2011     Priority: Medium     Rosacea 10/14/2011     Priority: Medium     Dermatitis 12/01/2010     Priority: Medium     CARDIOVASCULAR SCREENING; LDL GOAL LESS THAN 160 10/31/2010     Priority: Medium     Anxiety 12/09/2009     Priority: Medium     Lumbago 09/11/2007     Priority: Medium     Abnormal glandular Papanicolaou smear of cervix 04/23/2007     Priority: Medium     3/22/07 ASC-H  4/23/07 NIL/ neg HPV/ ECC (negative)  12/6/07 NIL  4/17/08 NIL  4/23/09 NIL  7/23/10 NIL       Allergic rhinitis 03/26/2007     Priority: Medium     Problem list name updated by automated process. Provider to review       Herpes simplex virus (HSV) infection 03/26/2007     Priority: Medium     Problem list name updated by automated process. Provider to review          LMP 06/15/2015 (Approximate)     Impression/Plan:  Patient with severe obstructive sleep apnea diagnosed on home sleep study currently doing very well on auto CPAP optimally titrated.  However look at her pressure requirement and some leak data it would be important to optimize her pressures further.  At this point pressure range can be narrowed from 5 to 15 cm to 8 to 15 cm.  Patient is also encouraged to continue wearing it more consistently rather than just meeting requirements of 70%.  Severe Sleep apnea.  Tolerating PAP well. Daytime symptoms are  improved.       Sumaya Gatica will follow up in about 1 year(s).     Fifteen minutes spent with patient, all of which were spent in virtual counseling, consulting, coordinating plan of care.      CC:  Tiffanie Montes Oleg Froymovich, MD

## 2021-05-06 ENCOUNTER — VIRTUAL VISIT (OUTPATIENT)
Dept: SLEEP MEDICINE | Facility: CLINIC | Age: 51
End: 2021-05-06
Payer: COMMERCIAL

## 2021-05-06 DIAGNOSIS — G47.33 OSA (OBSTRUCTIVE SLEEP APNEA): Primary | ICD-10-CM

## 2021-05-06 PROCEDURE — 99213 OFFICE O/P EST LOW 20 MIN: CPT | Mod: 95 | Performed by: OTOLARYNGOLOGY

## 2021-05-07 RX ORDER — ESCITALOPRAM OXALATE 10 MG/1
TABLET ORAL
Qty: 30 TABLET | Refills: 0 | OUTPATIENT
Start: 2021-05-07

## 2021-05-07 NOTE — TELEPHONE ENCOUNTER
"  Spoke with patient, she has discontinued this medication, stated that she \"does not take on a long term basis, just when she is overly stressed or anxious\".      She would like you to refuse the medication refill in the hopes that the pharmacy does not ask for it again, she said the she tried to explain it to the pharmacy but apparently was unsuccessful.    Patient did schedule a well exam for June.    Erin VALADEZO/  "

## 2021-05-08 ENCOUNTER — HEALTH MAINTENANCE LETTER (OUTPATIENT)
Age: 51
End: 2021-05-08

## 2021-05-18 ENCOUNTER — ALLIED HEALTH/NURSE VISIT (OUTPATIENT)
Dept: ALLERGY | Facility: OTHER | Age: 51
End: 2021-05-18
Payer: COMMERCIAL

## 2021-05-18 DIAGNOSIS — J30.81 ALLERGIC RHINITIS DUE TO ANIMAL DANDER: ICD-10-CM

## 2021-05-18 DIAGNOSIS — J30.9 ALLERGIC RHINITIS: ICD-10-CM

## 2021-05-18 DIAGNOSIS — J30.89 ALLERGIC RHINITIS DUE TO DUST MITE: ICD-10-CM

## 2021-05-18 DIAGNOSIS — J30.89 ALLERGIC RHINITIS DUE TO MOLD: Primary | ICD-10-CM

## 2021-05-18 PROCEDURE — 95117 IMMUNOTHERAPY INJECTIONS: CPT

## 2021-05-18 PROCEDURE — 99207 PR DROP WITH A PROCEDURE: CPT

## 2021-05-18 NOTE — PROGRESS NOTES
Patient presented after waiting 30 minutes with normal reaction to  injections. Discharged from clinic.    Gladis COTTER RN, Allergy Clinic 05/18/21 1:36 PM

## 2021-06-15 ENCOUNTER — ALLIED HEALTH/NURSE VISIT (OUTPATIENT)
Dept: ALLERGY | Facility: OTHER | Age: 51
End: 2021-06-15
Payer: COMMERCIAL

## 2021-06-15 ENCOUNTER — NURSE TRIAGE (OUTPATIENT)
Dept: FAMILY MEDICINE | Facility: OTHER | Age: 51
End: 2021-06-15

## 2021-06-15 DIAGNOSIS — J30.89 ALLERGIC RHINITIS DUE TO DUST MITE: ICD-10-CM

## 2021-06-15 DIAGNOSIS — J30.89 ALLERGIC RHINITIS DUE TO MOLD: Primary | ICD-10-CM

## 2021-06-15 DIAGNOSIS — Z91.018 TREE NUT ALLERGY: ICD-10-CM

## 2021-06-15 DIAGNOSIS — J30.1 CHRONIC SEASONAL ALLERGIC RHINITIS DUE TO POLLEN: ICD-10-CM

## 2021-06-15 DIAGNOSIS — J30.81 ALLERGIC RHINITIS DUE TO ANIMAL DANDER: ICD-10-CM

## 2021-06-15 DIAGNOSIS — Z91.013 SHELLFISH ALLERGY: ICD-10-CM

## 2021-06-15 PROCEDURE — 99207 PR DROP WITH A PROCEDURE: CPT

## 2021-06-15 PROCEDURE — 95117 IMMUNOTHERAPY INJECTIONS: CPT

## 2021-06-15 RX ORDER — EPINEPHRINE 0.3 MG/.3ML
0.3 INJECTION SUBCUTANEOUS PRN
Qty: 0.6 ML | Refills: 1 | Status: SHIPPED | OUTPATIENT
Start: 2021-06-15

## 2021-06-15 NOTE — TELEPHONE ENCOUNTER
Patient calling requesting pharmacy change for Epi Pen.    Last filled 6/15/20.     EPINEPHrine (EPIPEN 2-ROMARIO) 0.3 MG/0.3ML injection 2-pack 0.6 mL 1 3/3/2020  No   Sig - Route: Inject 0.3 mLs (0.3 mg) into the muscle as needed for anaphylaxis - Intramuscular     Refilled per [facility] refill protocol.    Dianna Carpenter RN  [facility]      Additional Information    Negative: Drug overdose and triager unable to answer question    Negative: Caller requesting information unrelated to medicine    Negative: Caller requesting a prescription for Strep throat and has a positive culture result    Negative: Rash while taking a medication or within 3 days of stopping it    Negative: Immunization reaction suspected    Negative: Asthma and having symptoms of asthma (cough, wheezing, etc.)    Negative: Breastfeeding questions about mother's medicines and diet    Negative: MORE THAN A DOUBLE DOSE of a prescription or over-the-counter (OTC) drug    Negative: DOUBLE DOSE (an extra dose or lesser amount) of over-the-counter (OTC) drug and any symptoms (e.g., dizziness, nausea, pain, sleepiness)    Negative: DOUBLE DOSE (an extra dose or lesser amount) of prescription drug and any symptoms (e.g., dizziness, nausea, pain, sleepiness)    Negative: Took another person's prescription drug    Negative: DOUBLE DOSE (an extra dose or lesser amount) of prescription drug and NO symptoms (Exception: a double dose of antibiotics)    Negative: Diabetes drug error or overdose (e.g., took wrong type of insulin or took extra dose)    Negative: Request for URGENT new prescription or refill of 'essential' medication (i.e., likelihood of harm to patient if not taken) and triager unable to fill per department policy    Negative: Prescription not at pharmacy and was prescribed today by PCP    Negative: Pharmacy calling with prescription questions and triager unable to answer question    Negative: Caller has urgent medication question about med that  PCP prescribed and triager unable to answer question    Negative: Caller has NON-URGENT medication question about med that PCP prescribed and triager unable to answer question    Negative: Caller requesting a NON-URGENT new prescription or refill and triager unable to refill per department policy    Negative: DOUBLE DOSE (an extra dose or lesser amount) of over-the-counter (OTC) drug and NO symptoms    Negative: DOUBLE DOSE (an extra dose or lesser amount) of antibiotic drug and NO symptoms    Negative: Caller has medication question only, adult not sick, and triager answers question    Negative: Caller has medication question, adult has minor symptoms, caller declines triage, and triager answers question    Caller requesting a refill, no triage required, and triager able to refill per department policy    Protocols used: MEDICATION QUESTION CALL-A-OH

## 2021-06-15 NOTE — PROGRESS NOTES
Patient presented after waiting 30 minutes with no reaction to allergy injections. Discharged from clinic.    Carolee ANDERSON RN Specialty Triage 6/15/2021 1:34 PM

## 2021-06-17 ENCOUNTER — HOSPITAL ENCOUNTER (OUTPATIENT)
Dept: MAMMOGRAPHY | Facility: CLINIC | Age: 51
Discharge: HOME | End: 2021-06-17
Attending: PHYSICIAN ASSISTANT | Admitting: PHYSICIAN ASSISTANT
Payer: COMMERCIAL

## 2021-06-17 DIAGNOSIS — N64.4 BREAST PAIN: ICD-10-CM

## 2021-06-17 DIAGNOSIS — Z12.31 ENCOUNTER FOR SCREENING MAMMOGRAM FOR BREAST CANCER: ICD-10-CM

## 2021-06-17 PROCEDURE — G0279 TOMOSYNTHESIS, MAMMO: HCPCS
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
## 2021-06-21 ENCOUNTER — OFFICE VISIT (OUTPATIENT)
Dept: FAMILY MEDICINE | Facility: OTHER | Age: 51
End: 2021-06-21
Payer: COMMERCIAL

## 2021-06-21 VITALS
HEART RATE: 82 BPM | SYSTOLIC BLOOD PRESSURE: 118 MMHG | DIASTOLIC BLOOD PRESSURE: 82 MMHG | RESPIRATION RATE: 16 BRPM | OXYGEN SATURATION: 100 % | TEMPERATURE: 98.2 F | WEIGHT: 176.4 LBS | HEIGHT: 67 IN | BODY MASS INDEX: 27.69 KG/M2

## 2021-06-21 DIAGNOSIS — D64.9 ANEMIA, UNSPECIFIED TYPE: ICD-10-CM

## 2021-06-21 DIAGNOSIS — Z13.1 SCREENING FOR DIABETES MELLITUS: Primary | ICD-10-CM

## 2021-06-21 DIAGNOSIS — Z13.6 CARDIOVASCULAR SCREENING; LDL GOAL LESS THAN 160: ICD-10-CM

## 2021-06-21 DIAGNOSIS — Z23 NEED FOR VACCINATION: ICD-10-CM

## 2021-06-21 DIAGNOSIS — Z00.00 ENCOUNTER FOR ROUTINE ADULT HEALTH EXAMINATION WITHOUT ABNORMAL FINDINGS: ICD-10-CM

## 2021-06-21 DIAGNOSIS — E55.9 VITAMIN D DEFICIENCY DISEASE: ICD-10-CM

## 2021-06-21 DIAGNOSIS — D51.9 ANEMIA DUE TO VITAMIN B12 DEFICIENCY, UNSPECIFIED B12 DEFICIENCY TYPE: ICD-10-CM

## 2021-06-21 DIAGNOSIS — G43.009 MIGRAINE WITHOUT AURA AND WITHOUT STATUS MIGRAINOSUS, NOT INTRACTABLE: ICD-10-CM

## 2021-06-21 DIAGNOSIS — L71.9 ROSACEA: ICD-10-CM

## 2021-06-21 LAB
ALBUMIN SERPL-MCNC: 3.9 G/DL (ref 3.4–5)
ALP SERPL-CCNC: 69 U/L (ref 40–150)
ALT SERPL W P-5'-P-CCNC: 21 U/L (ref 0–50)
ANION GAP SERPL CALCULATED.3IONS-SCNC: 4 MMOL/L (ref 3–14)
AST SERPL W P-5'-P-CCNC: 16 U/L (ref 0–45)
BILIRUB SERPL-MCNC: 0.5 MG/DL (ref 0.2–1.3)
BUN SERPL-MCNC: 12 MG/DL (ref 7–30)
CALCIUM SERPL-MCNC: 8.8 MG/DL (ref 8.5–10.1)
CHLORIDE SERPL-SCNC: 106 MMOL/L (ref 94–109)
CHOLEST SERPL-MCNC: 216 MG/DL
CO2 SERPL-SCNC: 29 MMOL/L (ref 20–32)
CREAT SERPL-MCNC: 0.83 MG/DL (ref 0.52–1.04)
DEPRECATED CALCIDIOL+CALCIFEROL SERPL-MC: 77 UG/L (ref 20–75)
ERYTHROCYTE [DISTWIDTH] IN BLOOD BY AUTOMATED COUNT: 12.2 % (ref 10–15)
FERRITIN SERPL-MCNC: 74 NG/ML (ref 8–252)
GFR SERPL CREATININE-BSD FRML MDRD: 82 ML/MIN/{1.73_M2}
GLUCOSE SERPL-MCNC: 85 MG/DL (ref 70–99)
HCT VFR BLD AUTO: 42 % (ref 35–47)
HCV AB SERPL QL IA: NONREACTIVE
HDLC SERPL-MCNC: 78 MG/DL
HGB BLD-MCNC: 13.9 G/DL (ref 11.7–15.7)
HIV 1+2 AB+HIV1 P24 AG SERPL QL IA: NONREACTIVE
LDLC SERPL CALC-MCNC: 116 MG/DL
MCH RBC QN AUTO: 31.1 PG (ref 26.5–33)
MCHC RBC AUTO-ENTMCNC: 33.1 G/DL (ref 31.5–36.5)
MCV RBC AUTO: 94 FL (ref 78–100)
NONHDLC SERPL-MCNC: 138 MG/DL
PLATELET # BLD AUTO: 288 10E9/L (ref 150–450)
POTASSIUM SERPL-SCNC: 4 MMOL/L (ref 3.4–5.3)
PROT SERPL-MCNC: 7.5 G/DL (ref 6.8–8.8)
RBC # BLD AUTO: 4.47 10E12/L (ref 3.8–5.2)
SODIUM SERPL-SCNC: 139 MMOL/L (ref 133–144)
TRIGL SERPL-MCNC: 112 MG/DL
VIT B12 SERPL-MCNC: 787 PG/ML (ref 193–986)
WBC # BLD AUTO: 6.4 10E9/L (ref 4–11)

## 2021-06-21 PROCEDURE — 99396 PREV VISIT EST AGE 40-64: CPT | Mod: 25 | Performed by: PHYSICIAN ASSISTANT

## 2021-06-21 PROCEDURE — 80061 LIPID PANEL: CPT | Performed by: NURSE PRACTITIONER

## 2021-06-21 PROCEDURE — 85027 COMPLETE CBC AUTOMATED: CPT | Performed by: NURSE PRACTITIONER

## 2021-06-21 PROCEDURE — 86803 HEPATITIS C AB TEST: CPT | Performed by: NURSE PRACTITIONER

## 2021-06-21 PROCEDURE — 80053 COMPREHEN METABOLIC PANEL: CPT | Performed by: NURSE PRACTITIONER

## 2021-06-21 PROCEDURE — 82728 ASSAY OF FERRITIN: CPT | Performed by: NURSE PRACTITIONER

## 2021-06-21 PROCEDURE — 82607 VITAMIN B-12: CPT | Performed by: NURSE PRACTITIONER

## 2021-06-21 PROCEDURE — 36415 COLL VENOUS BLD VENIPUNCTURE: CPT | Performed by: NURSE PRACTITIONER

## 2021-06-21 PROCEDURE — 99213 OFFICE O/P EST LOW 20 MIN: CPT | Mod: 25 | Performed by: PHYSICIAN ASSISTANT

## 2021-06-21 PROCEDURE — 90471 IMMUNIZATION ADMIN: CPT | Performed by: PHYSICIAN ASSISTANT

## 2021-06-21 PROCEDURE — 82306 VITAMIN D 25 HYDROXY: CPT | Performed by: NURSE PRACTITIONER

## 2021-06-21 PROCEDURE — 90715 TDAP VACCINE 7 YRS/> IM: CPT | Performed by: PHYSICIAN ASSISTANT

## 2021-06-21 PROCEDURE — 87389 HIV-1 AG W/HIV-1&-2 AB AG IA: CPT | Performed by: NURSE PRACTITIONER

## 2021-06-21 RX ORDER — SUMATRIPTAN 50 MG/1
50 TABLET, FILM COATED ORAL SEE ADMIN INSTRUCTIONS
Qty: 12 TABLET | Status: SHIPPED | OUTPATIENT
Start: 2021-06-21

## 2021-06-21 ASSESSMENT — ENCOUNTER SYMPTOMS
HEARTBURN: 0
ARTHRALGIAS: 0
SORE THROAT: 0
SHORTNESS OF BREATH: 0
DYSURIA: 0
WEAKNESS: 0
NAUSEA: 0
DIARRHEA: 0
PALPITATIONS: 0
DIZZINESS: 0
MYALGIAS: 0
BREAST MASS: 0
PARESTHESIAS: 0
COUGH: 0
ABDOMINAL PAIN: 0
FEVER: 0
HEMATURIA: 0
EYE PAIN: 0
FREQUENCY: 0
CHILLS: 0
HEMATOCHEZIA: 0
JOINT SWELLING: 0
CONSTIPATION: 0
NERVOUS/ANXIOUS: 1
HEADACHES: 1

## 2021-06-21 ASSESSMENT — MIFFLIN-ST. JEOR: SCORE: 1457.9

## 2021-06-21 NOTE — NURSING NOTE
Prior to immunization administration, verified patients identity using patient s name and date of birth. Please see Immunization Activity for additional information.     Screening Questionnaire for Adult Immunization    Are you sick today?   No   Do you have allergies to medications, food, a vaccine component or latex?   No   Have you ever had a serious reaction after receiving a vaccination?   No   Do you have a long-term health problem with heart, lung, kidney, or metabolic disease (e.g., diabetes), asthma, a blood disorder, no spleen, complement component deficiency, a cochlear implant, or a spinal fluid leak?  Are you on long-term aspirin therapy?   No   Do you have cancer, leukemia, HIV/AIDS, or any other immune system problem?   No   Do you have a parent, brother, or sister with an immune system problem?   No   In the past 3 months, have you taken medications that affect  your immune system, such as prednisone, other steroids, or anticancer drugs; drugs for the treatment of rheumatoid arthritis, Crohn s disease, or psoriasis; or have you had radiation treatments?   No   Have you had a seizure, or a brain or other nervous system problem?   No   During the past year, have you received a transfusion of blood or blood    products, or been given immune (gamma) globulin or antiviral drug?   No   For women: Are you pregnant or is there a chance you could become       pregnant during the next month?   No   Have you received any vaccinations in the past 4 weeks?   No     Immunization questionnaire answers were all negative.        Per orders of Dr. Tiffanie Montes, injection of Tdap given by ARCHANA QUILES MA. Patient instructed to remain in clinic for 15 minutes afterwards, and to report any adverse reaction to me immediately.       Screening performed by ARCHANA QUILES MA on 6/21/2021 at 7:48 AM.

## 2021-06-22 ASSESSMENT — PATIENT HEALTH QUESTIONNAIRE - PHQ9: SUM OF ALL RESPONSES TO PHQ QUESTIONS 1-9: 7

## 2021-07-13 ENCOUNTER — ALLIED HEALTH/NURSE VISIT (OUTPATIENT)
Dept: ALLERGY | Facility: OTHER | Age: 51
End: 2021-07-13
Payer: COMMERCIAL

## 2021-07-13 DIAGNOSIS — J30.9 ALLERGIC RHINITIS: Primary | ICD-10-CM

## 2021-07-13 PROCEDURE — 95117 IMMUNOTHERAPY INJECTIONS: CPT

## 2021-07-13 NOTE — PROGRESS NOTES
Patient presented after waiting 30 minutes with normal reaction to  injections. Discharged from clinic.    Gladis COTTER RN, Allergy Clinic 07/13/21 1:50 PM

## 2021-07-16 ENCOUNTER — OFFICE VISIT (OUTPATIENT)
Dept: DERMATOLOGY | Facility: CLINIC | Age: 51
End: 2021-07-16
Attending: PHYSICIAN ASSISTANT
Payer: COMMERCIAL

## 2021-07-16 VITALS — HEART RATE: 76 BPM | OXYGEN SATURATION: 99 % | SYSTOLIC BLOOD PRESSURE: 128 MMHG | DIASTOLIC BLOOD PRESSURE: 82 MMHG

## 2021-07-16 DIAGNOSIS — L71.9 ACNE ROSACEA: Primary | ICD-10-CM

## 2021-07-16 DIAGNOSIS — L71.9 ROSACEA: ICD-10-CM

## 2021-07-16 DIAGNOSIS — L81.6 POIKILODERMA: ICD-10-CM

## 2021-07-16 PROCEDURE — 99203 OFFICE O/P NEW LOW 30 MIN: CPT | Performed by: PHYSICIAN ASSISTANT

## 2021-07-16 NOTE — LETTER
2021         RE: Sumaya Gatica  [id number]  [place] [address]        Dear Colleague,    Thank you for referring your patient, Sumaya Gatica, to the [facility]. Please see a copy of my visit note below.    Sumaya Gatica is an extremely pleasant 51 year old year old female patient here today for rosacea. She notes she is getting BBL treatment at a derm clinic. She notes cheeks improved but her neck and chest did not. Patient has no other skin complaints today.  Remainder of the HPI, Meds, PMH, Allergies, FH, and SH was reviewed in chart.    Pertinent Hx:   Rosacea   Past Medical History:   Diagnosis Date     Abnormal Pap smear, can't excl hi gd sq intraepithelial lesion (ASC-H) 3/22/07    negative colposcopy     History of colposcopy with cervical biopsy 07     Ovarian cysts      Rosacea        Past Surgical History:   Procedure Laterality Date     COLONOSCOPY  2012    Procedure: COLONOSCOPY;  Colonscopy Screening, Diverticulitis;  Surgeon: Reilly Holt MD;  Location:  GI     DILATION AND CURETTAGE, HYSTEROSCOPY, ABLATE ENDOMETRIUM NOVASURE, COMBINED  2011    Procedure:COMBINED DILATION AND CURETTAGE, HYSTEROSCOPY, ABLATE ENDOMETRIUM NOVASURE; hysteroscopy, dialtion and curettage, novasure endometrial ablation  ; Surgeon:MILAD VILLARREAL; Location: OR     GYN SURGERY  2015    [facility]      HC REMOVAL OF TONSILS,<13 Y/O       HC REPAIR OF NASAL SEPTUM  08    Septoplasty, submucosal resection inferior turbinates.     HYSTERECTOMY, PAP NO LONGER INDICATED          Family History   Problem Relation Age of Onset     Alcohol/Drug Paternal Grandfather         alcohlism     Other Cancer Paternal Grandfather      Cancer Maternal Grandfather         gallbladder     Diabetes Maternal Grandmother              Cerebrovascular Disease Paternal Grandmother      Alzheimer Disease Paternal Grandmother         and dementia     Prostate  Cancer Brother      Diabetes Mother      Alzheimer Disease Mother      Cancer Brother         skin     Hodgkin's lymphoma Brother        Social History     Socioeconomic History     Marital status:      Spouse name: Sridhar     Number of children: 3     Years of education: Not on file     Highest education level: Not on file   Occupational History     Employer: SELF EMPLOYED     Employer: SELF   Tobacco Use     Smoking status: Never Smoker     Smokeless tobacco: Never Used   Substance and Sexual Activity     Alcohol use: Yes     Alcohol/week: 0.0 standard drinks     Comment: occ.     Drug use: No     Sexual activity: Yes     Partners: Male     Birth control/protection: Male Surgical, Female Surgical     Comment: ablasion and hysterectomy   Other Topics Concern      Service Not Asked     Blood Transfusions Not Asked     Caffeine Concern No     Occupational Exposure Not Asked     Hobby Hazards Not Asked     Sleep Concern No     Stress Concern Yes     Weight Concern Yes     Special Diet Not Asked     Back Care Not Asked     Exercise Yes     Bike Helmet Not Asked     Seat Belt Yes     Self-Exams Not Asked     Parent/sibling w/ CABG, MI or angioplasty before 65F 55M? No   Social History Narrative     Not on file     Social Determinants of Health     Financial Resource Strain:      Difficulty of Paying Living Expenses:    Food Insecurity:      Worried About Running Out of Food in the Last Year:      Ran Out of Food in the Last Year:    Transportation Needs:      Lack of Transportation (Medical):      Lack of Transportation (Non-Medical):    Physical Activity:      Days of Exercise per Week:      Minutes of Exercise per Session:    Stress:      Feeling of Stress :    Social Connections:      Frequency of Communication with Friends and Family:      Frequency of Social Gatherings with Friends and Family:      Attends Muslim Services:      Active Member of Clubs or Organizations:      Attends Club or  Organization Meetings:      Marital Status:    Intimate Partner Violence:      Fear of Current or Ex-Partner:      Emotionally Abused:      Physically Abused:      Sexually Abused:        Outpatient Encounter Medications as of 7/16/2021   Medication Sig Dispense Refill     calcium carbonate (OS-EDIS 500 MG [place]. CA) 1250 MG tablet Take 1 tablet by mouth daily       EPINEPHrine (EPIPEN 2-ROMARIO) 0.3 MG/0.3ML injection 2-pack Inject 0.3 mLs (0.3 mg) into the muscle as needed for anaphylaxis 0.6 mL 1     fexofenadine-pseudoePHEDrine (ALLEGRA-D 24) 180-240 MG 24 hr tablet Take 1 tablet by mouth daily 30 tablet 11     ibuprofen (ADVIL,MOTRIN) 200 MG tablet Take 400 mg by mouth every 4 hours as needed Reported on 2/27/2017       metroNIDAZOLE (METROCREAM) 0.75 % external cream Apply topically 2 times daily 45 g 7     montelukast (SINGULAIR) 10 MG tablet Take 1 tablet (10 mg) by mouth At Bedtime 30 tablet 6     Naltrexone HCl POWD        ORDER FOR ALLERGEN IMMUNOTHERAPY Abdiaziz, White 1:20 w/v, HS  0.5 ml  Birch Mix PRW 1:20 w/v, HS  0.5 ml  Boxelder-Maple Mix BHR (Boxelder Hard Red) 1:20 w/v, HS  0.5 ml  Cedar, Red 1:20 w/v, HS  0.5 ml  [place], Common 1:20 w/v, HS  0.5 ml  Elm, American 1:20 w/v, HS  0.5 ml  [place] Mix RW 1:20 w/v, HS 0.5 ml  Oak Mix RVW 1:20 w/v, HS 0.5 ml  Pine, White 1:20 w/v, ALK 0.5 ml  [place] Tree, Black 1:20 w/v, HS 0.5 ml  Diluent: HSA qs to 5ml 5 mL prn     ORDER FOR ALLERGEN IMMUNOTHERAPY Dog Hair-Dander, A. P.  1:100 w/v, HS  1.0 ml  Dust Mites DF. 10,000 AU/mL, HS  0.5 ml  Dust Mites DP. 10,000 AU/mL, HS  0.5 ml   Richard Grass 1:20 w/v, HS 0.5 ml  Bonilla Grass (Std) 100,000 BAU/mL, HS 0.4 ml  Diluent: HSA qs to 5ml 5 mL prn     ORDER FOR ALLERGEN IMMUNOTHERAPY Kochia 1:20 w/v, HS 0.5 ml  Lamb's Quarters 1:20 w/v, HS 0.5 ml  Nettle 1:20 w/v, HS 0.5 ml  Plantain, English 1:20 w/v, HS 0.5 ml  Ragweed, Mix (Giant, Short) 1:20 w/v, HS 0.5 ml  Russian Thistle 1:20 w/v, HS 0.5 ml  Sagebrush, Mugwort  1:20 w/v, HS 0.5 ml  Sorrel, Sheep 1:20 w/v, HS 0.5 ml  Diluent: HSA qs to 5ml 5 mL prn     ORDER FOR ALLERGEN IMMUNOTHERAPY Cat Hair, Standardized A.P. 10,000 BAU/mL, HS  2.0 ml   Alternaria Tenuis 1:10 w/v, HS  0.5 ml  Aspergillus Fumigatus 1:10 w/v, HS  0.5 ml  Epicoccum Nigrum 1:10 w/v, HS 0.5 ml  Penicillium Notatum 1:10 w/v, HS  0.5 ml  Diluent: HSA qs to 5ml 5 mL prn     POTASSIUM PO        progesterone POWD powder        SUMAtriptan (IMITREX) 50 MG tablet Take 1 tablet (50 mg) by mouth See Admin Instructions For ONSET OF MIGRAINE, MAY REPEAT ONCE AFTER 2 HRS. DO NOT EXCEED 4 TABLETS IN 24 HRS. 12 tablet prn     triamcinolone (NASACORT) 55 MCG/ACT nasal aerosol Spray 2 sprays into both nostrils daily 1 Bottle 11     UNABLE TO FIND Apply topically daily MEDICATION NAME: testosterone cream       VITAMIN D, CHOLECALCIFEROL, PO Take 10,000 Units by mouth daily       olopatadine (PATANOL) 0.1 % ophthalmic solution Place 1 drop into both eyes 2 times daily (Patient not taking: Reported on 7/16/2021) 1 Bottle 4     No facility-administered encounter medications on file as of 7/16/2021.             O:   NAD, WDWN, Alert & Oriented, Mood & Affect wnl, Vitals stable   Here today alone   /82 (BP Location: Right arm, Patient Position: Sitting, Cuff Size: Adult Regular)   Pulse 76   LMP 06/15/2015 (Approximate)   SpO2 99%    General appearance normal   Vitals stable   Alert, oriented and in no acute distress  Mild erythema with small inflammatory macules on cheeks  Reddish brownish patches on chest, sides of neck     Eyes: Conjunctivae/lids:Normal     ENT: Lips: normal    MSK:Normal    Pulm: Breathing Normal    Neuro/Psych: Orientation:Alert and Orientedx3 ; Mood/Affect:normal   A/P:  1. Rosacea  Apply metrocream twice daily. Use daily sunscreen.   Use gentle cleanser and moisturizer.     2. Poikilioderma   Discussed that this can be difficult to treat, advised to see l skin and laser in [place] or [place].        Again, thank you for allowing me to participate in the care of your patient.        Sincerely,        Dianna Maharaj PA-C

## 2021-07-16 NOTE — NURSING NOTE
Chief Complaint   Patient presents with     Rosacea       Vitals:    07/16/21 0713   BP: 128/82   BP Location: Right arm   Patient Position: Sitting   Cuff Size: Adult Regular   Pulse: 76   SpO2: 99%     Wt Readings from Last 1 Encounters:   06/21/21 80 kg (176 lb 6.4 oz)       Lesly Damon LPN .................7/16/2021

## 2021-07-19 NOTE — PROGRESS NOTES
Sumaya Gatica is an extremely pleasant 51 year old year old female patient here today for rosacea. She notes she is getting BBL treatment at a derm clinic. She notes cheeks improved but her neck and chest did not. Patient has no other skin complaints today.  Remainder of the HPI, Meds, PMH, Allergies, FH, and SH was reviewed in chart.    Pertinent Hx:   Rosacea   Past Medical History:   Diagnosis Date     Abnormal Pap smear, can't excl hi gd sq intraepithelial lesion (ASC-H) 3/22/07    negative colposcopy     History of colposcopy with cervical biopsy 07     Ovarian cysts      Rosacea        Past Surgical History:   Procedure Laterality Date     COLONOSCOPY  2012    Procedure: COLONOSCOPY;  Colonscopy Screening, Diverticulitis;  Surgeon: Reilly Holt MD;  Location:  GI     DILATION AND CURETTAGE, HYSTEROSCOPY, ABLATE ENDOMETRIUM NOVASURE, COMBINED  2011    Procedure:COMBINED DILATION AND CURETTAGE, HYSTEROSCOPY, ABLATE ENDOMETRIUM NOVASURE; hysteroscopy, dialtion and curettage, novasure endometrial ablation  ; Surgeon:MILAD VILLARREAL; Location: OR     GYN SURGERY      [facility]      HC REMOVAL OF TONSILS,<11 Y/O       HC REPAIR OF NASAL SEPTUM  08    Septoplasty, submucosal resection inferior turbinates.     HYSTERECTOMY, PAP NO LONGER INDICATED          Family History   Problem Relation Age of Onset     Alcohol/Drug Paternal Grandfather         alcohlism     Other Cancer Paternal Grandfather      Cancer Maternal Grandfather         gallbladder     Diabetes Maternal Grandmother              Cerebrovascular Disease Paternal Grandmother      Alzheimer Disease Paternal Grandmother         and dementia     Prostate Cancer Brother      Diabetes Mother      Alzheimer Disease Mother      Cancer Brother         skin     Hodgkin's lymphoma Brother        Social History     Socioeconomic History     Marital status:      Spouse name: Sridhar     Number of children: 3     Years of  education: Not on file     Highest education level: Not on file   Occupational History     Employer: SELF EMPLOYED     Employer: SELF   Tobacco Use     Smoking status: Never Smoker     Smokeless tobacco: Never Used   Substance and Sexual Activity     Alcohol use: Yes     Alcohol/week: 0.0 standard drinks     Comment: occ.     Drug use: No     Sexual activity: Yes     Partners: Male     Birth control/protection: Male Surgical, Female Surgical     Comment: ablasion and hysterectomy   Other Topics Concern      Service Not Asked     Blood Transfusions Not Asked     Caffeine Concern No     Occupational Exposure Not Asked     Hobby Hazards Not Asked     Sleep Concern No     Stress Concern Yes     Weight Concern Yes     Special Diet Not Asked     Back Care Not Asked     Exercise Yes     Bike Helmet Not Asked     Seat Belt Yes     Self-Exams Not Asked     Parent/sibling w/ CABG, MI or angioplasty before 65F 55M? No   Social History Narrative     Not on file     Social Determinants of Health     Financial Resource Strain:      Difficulty of Paying Living Expenses:    Food Insecurity:      Worried About Running Out of Food in the Last Year:      Ran Out of Food in the Last Year:    Transportation Needs:      Lack of Transportation (Medical):      Lack of Transportation (Non-Medical):    Physical Activity:      Days of Exercise per Week:      Minutes of Exercise per Session:    Stress:      Feeling of Stress :    Social Connections:      Frequency of Communication with Friends and Family:      Frequency of Social Gatherings with Friends and Family:      Attends Orthodox Services:      Active Member of Clubs or Organizations:      Attends Club or Organization Meetings:      Marital Status:    Intimate Partner Violence:      Fear of Current or Ex-Partner:      Emotionally Abused:      Physically Abused:      Sexually Abused:        Outpatient Encounter Medications as of 7/16/2021   Medication Sig Dispense Refill      calcium carbonate (OS-EDIS 500 MG [place]. CA) 1250 MG tablet Take 1 tablet by mouth daily       EPINEPHrine (EPIPEN 2-ROMARIO) 0.3 MG/0.3ML injection 2-pack Inject 0.3 mLs (0.3 mg) into the muscle as needed for anaphylaxis 0.6 mL 1     fexofenadine-pseudoePHEDrine (ALLEGRA-D 24) 180-240 MG 24 hr tablet Take 1 tablet by mouth daily 30 tablet 11     ibuprofen (ADVIL,MOTRIN) 200 MG tablet Take 400 mg by mouth every 4 hours as needed Reported on 2/27/2017       metroNIDAZOLE (METROCREAM) 0.75 % external cream Apply topically 2 times daily 45 g 7     montelukast (SINGULAIR) 10 MG tablet Take 1 tablet (10 mg) by mouth At Bedtime 30 tablet 6     Naltrexone HCl POWD        ORDER FOR ALLERGEN IMMUNOTHERAPY Abdiaziz, White 1:20 w/v, HS  0.5 ml  Birch Mix PRW 1:20 w/v, HS  0.5 ml  Boxelder-Maple Mix BHR (Boxelder Hard Red) 1:20 w/v, HS  0.5 ml  Cedar, Red 1:20 w/v, HS  0.5 ml  [place], Common 1:20 w/v, HS  0.5 ml  Elm, American 1:20 w/v, HS  0.5 ml  [place] Mix RW 1:20 w/v, HS 0.5 ml  Oak Mix RVW 1:20 w/v, HS 0.5 ml  Pine, White 1:20 w/v, ALK 0.5 ml  [place] Tree, Black 1:20 w/v, HS 0.5 ml  Diluent: HSA qs to 5ml 5 mL prn     ORDER FOR ALLERGEN IMMUNOTHERAPY Dog Hair-Dander, A. P.  1:100 w/v, HS  1.0 ml  Dust Mites DF. 10,000 AU/mL, HS  0.5 ml  Dust Mites DP. 10,000 AU/mL, HS  0.5 ml   Richard Grass 1:20 w/v, HS 0.5 ml  Bonilla Grass (Std) 100,000 BAU/mL, HS 0.4 ml  Diluent: HSA qs to 5ml 5 mL prn     ORDER FOR ALLERGEN IMMUNOTHERAPY Kochia 1:20 w/v, HS 0.5 ml  Lamb's Quarters 1:20 w/v, HS 0.5 ml  Nettle 1:20 w/v, HS 0.5 ml  Plantain, English 1:20 w/v, HS 0.5 ml  Ragweed, Mix (Giant, Short) 1:20 w/v, HS 0.5 ml  Russian Thistle 1:20 w/v, HS 0.5 ml  Sagebrush, Mugwort 1:20 w/v, HS 0.5 ml  Sorrel, Sheep 1:20 w/v, HS 0.5 ml  Diluent: HSA qs to 5ml 5 mL prn     ORDER FOR ALLERGEN IMMUNOTHERAPY Cat Hair, Standardized A.P. 10,000 BAU/mL, HS  2.0 ml   Alternaria Tenuis 1:10 w/v, HS  0.5 ml  Aspergillus Fumigatus 1:10 w/v, HS  0.5 ml  Epicoccum  Nigrum 1:10 w/v, HS 0.5 ml  Penicillium Notatum 1:10 w/v, HS  0.5 ml  Diluent: HSA qs to 5ml 5 mL prn     POTASSIUM PO        progesterone POWD powder        SUMAtriptan (IMITREX) 50 MG tablet Take 1 tablet (50 mg) by mouth See Admin Instructions For ONSET OF MIGRAINE, MAY REPEAT ONCE AFTER 2 HRS. DO NOT EXCEED 4 TABLETS IN 24 HRS. 12 tablet prn     triamcinolone (NASACORT) 55 MCG/ACT nasal aerosol Spray 2 sprays into both nostrils daily 1 Bottle 11     UNABLE TO FIND Apply topically daily MEDICATION NAME: testosterone cream       VITAMIN D, CHOLECALCIFEROL, PO Take 10,000 Units by mouth daily       olopatadine (PATANOL) 0.1 % ophthalmic solution Place 1 drop into both eyes 2 times daily (Patient not taking: Reported on 7/16/2021) 1 Bottle 4     No facility-administered encounter medications on file as of 7/16/2021.             O:   NAD, WDWN, Alert & Oriented, Mood & Affect wnl, Vitals stable   Here today alone   /82 (BP Location: Right arm, Patient Position: Sitting, Cuff Size: Adult Regular)   Pulse 76   LMP 06/15/2015 (Approximate)   SpO2 99%    General appearance normal   Vitals stable   Alert, oriented and in no acute distress  Mild erythema with small inflammatory macules on cheeks  Reddish brownish patches on chest, sides of neck     Eyes: Conjunctivae/lids:Normal     ENT: Lips: normal    MSK:Normal    Pulm: Breathing Normal    Neuro/Psych: Orientation:Alert and Orientedx3 ; Mood/Affect:normal   A/P:  1. Rosacea  Apply metrocream twice daily. Use daily sunscreen.   Use gentle cleanser and moisturizer.     2. Poikilioderma   Discussed that this can be difficult to treat, advised to see l skin and laser in [place] or [place].

## 2021-07-27 DIAGNOSIS — J30.1 CHRONIC SEASONAL ALLERGIC RHINITIS DUE TO POLLEN: ICD-10-CM

## 2021-07-27 RX ORDER — MONTELUKAST SODIUM 10 MG/1
10 TABLET ORAL AT BEDTIME
Qty: 30 TABLET | Refills: 0 | Status: SHIPPED | OUTPATIENT
Start: 2021-07-27 | End: 2021-08-23

## 2021-07-27 NOTE — TELEPHONE ENCOUNTER
Signed Prescriptions:                        Disp   Refills    montelukast (SINGULAIR) 10 MG tablet       30 tab*0        Sig: Take 1 tablet (10 mg) by mouth At Bedtime Patient           needs to be seen in clinic.  Authorizing Provider: TREMAINE MORALES  Ordering User: JANES SORIANO    RN refilled medication per [facility] Refill Protocol.       Janes COTTER RN, Allergy Clinic 07/27/21 2:02 PM

## 2021-08-11 ENCOUNTER — ALLIED HEALTH/NURSE VISIT (OUTPATIENT)
Dept: ALLERGY | Facility: OTHER | Age: 51
End: 2021-08-11
Payer: COMMERCIAL

## 2021-08-11 DIAGNOSIS — J30.9 ALLERGIC RHINITIS: ICD-10-CM

## 2021-08-11 DIAGNOSIS — J30.89 ALLERGIC RHINITIS DUE TO DUST MITE: ICD-10-CM

## 2021-08-11 DIAGNOSIS — J30.1 CHRONIC SEASONAL ALLERGIC RHINITIS DUE TO POLLEN: ICD-10-CM

## 2021-08-11 DIAGNOSIS — J30.81 ALLERGIC RHINITIS DUE TO ANIMAL DANDER: ICD-10-CM

## 2021-08-11 DIAGNOSIS — J30.89 ALLERGIC RHINITIS DUE TO MOLD: ICD-10-CM

## 2021-08-11 DIAGNOSIS — J30.89 ALLERGIC RHINITIS DUE TO MOLD: Primary | ICD-10-CM

## 2021-08-11 PROCEDURE — 95117 IMMUNOTHERAPY INJECTIONS: CPT

## 2021-08-11 NOTE — TELEPHONE ENCOUNTER
ALLERGY SOLUTION RE-ORDER REQUEST    Sumaya Gatica 1970 MRN: [id number]    DATE NEEDED:  08/25/2021  Vial Color Content    Vial Size  Red 1:1 Trees    5mL  Red 1:1 Dog, Grass, Dust Mite   5mL  Red 1:1 Cat, Molds    5mL  Red 1:1 Weeds    5mL      Serum reorder consent signed and patient/parent was advised that new serums would be ordered through the pharmacy and billed to their insurance company when they arrive in clinic. Yes    Shot Clinic Location:  [facility]  Ship to Location: [facility]  Serum billed to:  [facility]      Requester Signature  Gladis Hopper, RN

## 2021-08-11 NOTE — PROGRESS NOTES
Patient presented after waiting 30 minutes with no reaction to allergy injections. Discharged from clinic.    Pallavi Glaser RN

## 2021-08-19 DIAGNOSIS — J30.81 ALLERGIC RHINITIS DUE TO ANIMAL DANDER: ICD-10-CM

## 2021-08-19 DIAGNOSIS — J30.89 ALLERGIC RHINITIS DUE TO DUST MITE: ICD-10-CM

## 2021-08-19 DIAGNOSIS — J30.1 CHRONIC SEASONAL ALLERGIC RHINITIS DUE TO POLLEN: Primary | ICD-10-CM

## 2021-08-19 PROCEDURE — 95165 ANTIGEN THERAPY SERVICES: CPT | Performed by: ALLERGY & IMMUNOLOGY

## 2021-08-19 NOTE — PROGRESS NOTES
Allergy serums billed to [facility] [facility].     Vials billed below:    Vial Color Content                      Vial Size Expiration Date  Red 1:1 Trees 5mL  8/19/2022  Red 1:1 Dog, Grass, Dust Mite 5mL  8/19/2022    Checked by Min ORELLANA LPN  Billed 20 units      Signature  Neela Castillo RN

## 2021-08-20 DIAGNOSIS — J30.81 ALLERGIC RHINITIS DUE TO ANIMAL DANDER: ICD-10-CM

## 2021-08-20 DIAGNOSIS — J30.89 ALLERGIC RHINITIS DUE TO MOLD: ICD-10-CM

## 2021-08-20 DIAGNOSIS — J30.1 CHRONIC SEASONAL ALLERGIC RHINITIS DUE TO POLLEN: Primary | ICD-10-CM

## 2021-08-20 PROCEDURE — 95165 ANTIGEN THERAPY SERVICES: CPT | Performed by: ALLERGY & IMMUNOLOGY

## 2021-08-20 NOTE — PROGRESS NOTES
Allergy serums billed to [employer].     Vials billed below:    Vial Color Content                      Vial Size Expiration Date  Red 1:1                                   Cat, Molds                            5mL 8/20/2022  Red 1:1                                   Weeds                                 5mL  8/20/2022    Checked by Min ORELLANA  20 units billed     Signature  Elsy Au RN

## 2021-08-23 DIAGNOSIS — J30.1 CHRONIC SEASONAL ALLERGIC RHINITIS DUE TO POLLEN: ICD-10-CM

## 2021-08-23 RX ORDER — MONTELUKAST SODIUM 10 MG/1
10 TABLET ORAL AT BEDTIME
Qty: 30 TABLET | Refills: 1 | Status: SHIPPED | OUTPATIENT
Start: 2021-08-23 | End: 2021-10-15

## 2021-08-23 NOTE — TELEPHONE ENCOUNTER
"Requested Prescriptions   Pending Prescriptions Disp Refills     montelukast (SINGULAIR) 10 MG tablet 30 tablet 0     Sig: Take 1 tablet (10 mg) by mouth At Bedtime Patient needs to be seen in clinic.       Leukotriene Inhibitors Protocol Passed - 8/23/2021  8:54 AM        Passed - Patient is age 12 or older     If patient is under 16, ok to refill using age based dosing.           Passed - Recent (12 mo) or future (30 days) visit within the authorizing provider's specialty     Patient has had an office visit with the authorizing provider or a provider within the authorizing providers department within the previous 12 mos or has a future within next 30 days. See \"Patient Info\" tab in inbasket, or \"Choose Columns\" in Meds & Orders section of the refill encounter.              Passed - Medication is active on med list           Medication filled per [facility] protocol.     Kay Hammonds RN  "

## 2021-08-24 NOTE — TELEPHONE ENCOUNTER
Allergy serums received at [facility].     Vials received below:    Vial Color Content                      Vial Size Expiration Date  Red 1:1 Dog, Grass, Dust Mite 5mL  08/19/2022  Red 1:1 Trees 5mL  07/28/2022  Red 1:1 Weeds 5mL  08/20/2022  Red 1:1 Cat, Molds 5mL  08/20/2022      Signature  Gladis Hopper RN

## 2021-09-14 ENCOUNTER — E-VISIT (OUTPATIENT)
Dept: FAMILY MEDICINE | Facility: OTHER | Age: 51
End: 2021-09-14

## 2021-09-14 ENCOUNTER — ALLIED HEALTH/NURSE VISIT (OUTPATIENT)
Dept: ALLERGY | Facility: OTHER | Age: 51
End: 2021-09-14
Payer: COMMERCIAL

## 2021-09-14 DIAGNOSIS — J30.1 CHRONIC SEASONAL ALLERGIC RHINITIS DUE TO POLLEN: Primary | ICD-10-CM

## 2021-09-14 DIAGNOSIS — J30.89 ALLERGIC RHINITIS DUE TO MOLD: ICD-10-CM

## 2021-09-14 DIAGNOSIS — J30.89 ALLERGIC RHINITIS DUE TO DUST MITE: ICD-10-CM

## 2021-09-14 DIAGNOSIS — K64.4 EXTERNAL HEMORRHOIDS: Primary | ICD-10-CM

## 2021-09-14 DIAGNOSIS — J30.81 ALLERGIC RHINITIS DUE TO ANIMAL DANDER: ICD-10-CM

## 2021-09-14 PROCEDURE — 99421 OL DIG E/M SVC 5-10 MIN: CPT | Performed by: PHYSICIAN ASSISTANT

## 2021-09-14 PROCEDURE — 95117 IMMUNOTHERAPY INJECTIONS: CPT

## 2021-09-14 NOTE — PROGRESS NOTES
Patient presented after waiting 30 minutes with no reaction to allergy injections. Discharged from clinic.    Estrada BARR RN ............   9/14/2021...2:39 PM

## 2021-09-22 ENCOUNTER — ALLIED HEALTH/NURSE VISIT (OUTPATIENT)
Dept: ALLERGY | Facility: OTHER | Age: 51
End: 2021-09-22
Payer: COMMERCIAL

## 2021-09-22 ENCOUNTER — OFFICE VISIT (OUTPATIENT)
Dept: SURGERY | Facility: OTHER | Age: 51
End: 2021-09-22
Attending: PHYSICIAN ASSISTANT
Payer: COMMERCIAL

## 2021-09-22 VITALS
WEIGHT: 176 LBS | TEMPERATURE: 99.6 F | DIASTOLIC BLOOD PRESSURE: 70 MMHG | BODY MASS INDEX: 27.62 KG/M2 | HEIGHT: 67 IN | SYSTOLIC BLOOD PRESSURE: 120 MMHG

## 2021-09-22 DIAGNOSIS — J30.1 CHRONIC SEASONAL ALLERGIC RHINITIS DUE TO POLLEN: Primary | ICD-10-CM

## 2021-09-22 DIAGNOSIS — J30.9 ALLERGIC RHINITIS: ICD-10-CM

## 2021-09-22 DIAGNOSIS — J30.89 ALLERGIC RHINITIS DUE TO MOLD: ICD-10-CM

## 2021-09-22 DIAGNOSIS — J30.81 ALLERGIC RHINITIS DUE TO ANIMAL DANDER: ICD-10-CM

## 2021-09-22 DIAGNOSIS — K64.4 EXTERNAL HEMORRHOIDS: ICD-10-CM

## 2021-09-22 DIAGNOSIS — J30.89 ALLERGIC RHINITIS DUE TO DUST MITE: ICD-10-CM

## 2021-09-22 PROCEDURE — 99243 OFF/OP CNSLTJ NEW/EST LOW 30: CPT | Performed by: SURGERY

## 2021-09-22 PROCEDURE — 95117 IMMUNOTHERAPY INJECTIONS: CPT

## 2021-09-22 ASSESSMENT — MIFFLIN-ST. JEOR: SCORE: 1451.04

## 2021-09-22 NOTE — LETTER
9/22/2021         RE: Sumaya Gatica  [address]  [place] [address]        Dear Colleague,    Thank you for referring your patient, Sumaya Gatica, to the [facility]. Please see a copy of my visit note below.    Patient seen in consultation for external hemorrhoids by Tiffanie Montes    HPI:  Patient is a 51 year old female with many year history of external hemorrhoids which started when she was pregnant. She denies blood in her stool and no significant pain but she has difficulty keeping her anal area clean following a bowel movement due to the residual hemorrhoidal skin tissue. She does not take any blood thinning medications. She does not take a fiber supplement.    Review Of Systems    Skin: negative  Ears/Nose/Throat: negative  Respiratory: No shortness of breath, dyspnea on exertion, cough, or hemoptysis  Cardiovascular: negative  Gastrointestinal: as above  Genitourinary: negative  Musculoskeletal: negative  Neurologic: negative  Hematologic/Lymphatic/Immunologic: negative  Endocrine: negative      Past Medical History:   Diagnosis Date     Abnormal Pap smear, can't excl hi gd sq intraepithelial lesion (ASC-H) 3/22/07    negative colposcopy     History of colposcopy with cervical biopsy 4/23/07     Ovarian cysts      Rosacea        Past Surgical History:   Procedure Laterality Date     COLONOSCOPY  6/22/2012    Procedure: COLONOSCOPY;  Colonscopy Screening, Diverticulitis;  Surgeon: Reilly Holt MD;  Location:  GI     DILATION AND CURETTAGE, HYSTEROSCOPY, ABLATE ENDOMETRIUM NOVASURE, COMBINED  12/30/2011    Procedure:COMBINED DILATION AND CURETTAGE, HYSTEROSCOPY, ABLATE ENDOMETRIUM NOVASURE; hysteroscopy, dialtion and curettage, novasure endometrial ablation  ; Surgeon:MILAD VILLARREAL; Location: OR     GYN SURGERY  2015    Hy      HC REMOVAL OF TONSILS,<13 Y/O       HC REPAIR OF NASAL SEPTUM  12/30/08    Septoplasty, submucosal resection  inferior turbinates.     HYSTERECTOMY, PAP NO LONGER INDICATED         Family History   Problem Relation Age of Onset     Alcohol/Drug Paternal Grandfather         alcohlism     Other Cancer Paternal Grandfather      Cancer Maternal Grandfather         gallbladder     Diabetes Maternal Grandmother              Cerebrovascular Disease Paternal Grandmother      Alzheimer Disease Paternal Grandmother         and dementia     Prostate Cancer Brother      Diabetes Mother      Alzheimer Disease Mother      Cancer Brother         skin     Hodgkin's lymphoma Brother        Social History     Socioeconomic History     Marital status: Legally      Spouse name: Sridhar     Number of children: 3     Years of education: Not on file     Highest education level: Not on file   Occupational History     Employer: SELF EMPLOYED     Employer: SELF   Tobacco Use     Smoking status: Never Smoker     Smokeless tobacco: Never Used   Substance and Sexual Activity     Alcohol use: Yes     Alcohol/week: 0.0 standard drinks     Comment: occ.     Drug use: No     Sexual activity: Yes     Partners: Male     Birth control/protection: Male Surgical, Female Surgical     Comment: ablasion and hysterectomy   Other Topics Concern      Service Not Asked     Blood Transfusions Not Asked     Caffeine Concern No     Occupational Exposure Not Asked     Hobby Hazards Not Asked     Sleep Concern No     Stress Concern Yes     Weight Concern Yes     Special Diet Not Asked     Back Care Not Asked     Exercise Yes     Bike Helmet Not Asked     Seat Belt Yes     Self-Exams Not Asked     Parent/sibling w/ CABG, MI or angioplasty before 65F 55M? No   Social History Narrative     Not on file     Social Determinants of Health     Financial Resource Strain:      Difficulty of Paying Living Expenses:    Food Insecurity:      Worried About Running Out of Food in the Last Year:      Ran Out of Food in the Last Year:    Transportation Needs:       Lack of Transportation (Medical):      Lack of Transportation (Non-Medical):    Physical Activity:      Days of Exercise per Week:      Minutes of Exercise per Session:    Stress:      Feeling of Stress :    Social Connections:      Frequency of Communication with Friends and Family:      Frequency of Social Gatherings with Friends and Family:      Attends Church Services:      Active Member of Clubs or Organizations:      Attends Club or Organization Meetings:      Marital Status:    Intimate Partner Violence:      Fear of Current or Ex-Partner:      Emotionally Abused:      Physically Abused:      Sexually Abused:        Current Outpatient Medications   Medication Sig Dispense Refill     calcium carbonate (OS-EDIS 500 MG [place]. [place]) 1250 MG tablet Take 1 tablet by mouth daily       EPINEPHrine (EPIPEN 2-ROMARIO) 0.3 MG/0.3ML injection 2-pack Inject 0.3 mLs (0.3 mg) into the muscle as needed for anaphylaxis 0.6 mL 1     fexofenadine-pseudoePHEDrine (ALLEGRA-D 24) 180-240 MG 24 hr tablet Take 1 tablet by mouth daily 30 tablet 11     ibuprofen (ADVIL,MOTRIN) 200 MG tablet Take 400 mg by mouth every 4 hours as needed Reported on 2/27/2017       metroNIDAZOLE (METROCREAM) 0.75 % external cream Apply topically 2 times daily 45 g 7     montelukast (SINGULAIR) 10 MG tablet Take 1 tablet (10 mg) by mouth At Bedtime Patient needs to be seen in clinic. 30 tablet 1     Naltrexone HCl POWD        olopatadine (PATANOL) 0.1 % ophthalmic solution Place 1 drop into both eyes 2 times daily (Patient not taking: Reported on 7/16/2021) 1 Bottle 4     ORDER FOR ALLERGEN IMMUNOTHERAPY Abdiaziz, White 1:20 w/v, HS  0.5 ml  Birch Mix PRW 1:20 w/v, HS  0.5 ml  Boxelder-Maple Mix BHR (Boxelder Hard Red) 1:20 w/v, HS  0.5 ml  Cedar, Red 1:20 w/v, HS  0.5 ml  [place], Common 1:20 w/v, HS  0.5 ml  Elm, American 1:20 w/v, HS  0.5 ml  [place] Mix RW 1:20 w/v, HS 0.5 ml  Oak Mix RVW 1:20 w/v, HS 0.5 ml  Pine, White 1:20 w/v, ALK 0.5 ml  [place] Tree,  "Black 1:20 w/v, HS 0.5 ml  Diluent: HSA qs to 5ml 5 mL prn     ORDER FOR ALLERGEN IMMUNOTHERAPY Dog Hair-Dander, A. P.  1:100 w/v, HS  1.0 ml  Dust Mites DF. 10,000 AU/mL, HS  0.5 ml  Dust Mites DP. 10,000 AU/mL, HS  0.5 ml   Richard Grass 1:20 w/v, HS 0.5 ml  Bonilla Grass (Std) 100,000 BAU/mL, HS 0.4 ml  Diluent: HSA qs to 5ml 5 mL prn     ORDER FOR ALLERGEN IMMUNOTHERAPY Kochia 1:20 w/v, HS 0.5 ml  Lamb's Quarters 1:20 w/v, HS 0.5 ml  Nettle 1:20 w/v, HS 0.5 ml  Plantain, English 1:20 w/v, HS 0.5 ml  Ragweed, Mix (Giant, Short) 1:20 w/v, HS 0.5 ml  Russian Thistle 1:20 w/v, HS 0.5 ml  Sagebrush, Mugwort 1:20 w/v, HS 0.5 ml  Sorrel, Sheep 1:20 w/v, HS 0.5 ml  Diluent: HSA qs to 5ml 5 mL prn     ORDER FOR ALLERGEN IMMUNOTHERAPY Cat Hair, Standardized A.P. 10,000 BAU/mL, HS  2.0 ml   Alternaria Tenuis 1:10 w/v, HS  0.5 ml  Aspergillus Fumigatus 1:10 w/v, HS  0.5 ml  Epicoccum Nigrum 1:10 w/v, HS 0.5 ml  Penicillium Notatum 1:10 w/v, HS  0.5 ml  Diluent: HSA qs to 5ml 5 mL prn     POTASSIUM PO        progesterone POWD powder        SUMAtriptan (IMITREX) 50 MG tablet Take 1 tablet (50 mg) by mouth See Admin Instructions For ONSET OF MIGRAINE, MAY REPEAT ONCE AFTER 2 HRS. DO NOT EXCEED 4 TABLETS IN 24 HRS. 12 tablet prn     triamcinolone (NASACORT) 55 MCG/ACT nasal aerosol Spray 2 sprays into both nostrils daily 1 Bottle 11     UNABLE TO FIND Apply topically daily MEDICATION NAME: testosterone cream       VITAMIN D, CHOLECALCIFEROL, PO Take 10,000 Units by mouth daily         Medications and history reviewed    Physical exam:  Vitals: /70   Temp 99.6  F (37.6  C) (Temporal)   Ht 1.71 m (5' 7.32\")   Wt 79.8 kg (176 lb)   LMP 06/15/2015 (Approximate)   BMI 27.30 kg/m    BMI= Body mass index is 27.3 kg/m .    Constitutional: Healthy, alert, non-distressed   Head: Normo-cephalic, atraumatic, no lesions, masses or tenderness   Cardiovascular: RRR, no new murmurs, +S1, +S2 heart sounds, no clicks, rubs or gallops "   Respiratory: CTAB, no rales, rhonchi or wheezing, equal chest rise, good respiratory effort   Gastrointestinal: Soft, non-tender, non distended, no rebound rigidity or guarding, no masses or hernias palpated   : Deferred  Rectal: small left anterior skin tag almost in confluence with labia. No thrombosed hemorrhoids. NOÉ without masses or bleeding  Musculoskeletal: Moves all extremities, normal  strength, no deformities noted   Skin: No suspicious lesions or rashes   Psychiatric: Mentation appears normal, affect appropriate   Hematologic/Lymphatic/Immunologic: Normal cervical and supraclavicular lymph nodes   Patient able to get up on table without difficulty.    Labs show:  No results found for this or any previous visit (from the past 24 hour(s)).    Imaging shows:  none    Assessment:     ICD-10-CM    1. External hemorrhoids  K64.4 Adult General Surg Referral     Case Request: HEMORRHOIDECTOMY, EXTERNAL     Plan: Due to the hygiene issue with the residual hemorrhoidal tissue I recommend OR excision of her external hemorrhoid. We discussed the procedure in detail. We also discussed the risks, benefits, alternatives and post-op care and restrictions. After our informed discussion we decided to proceed with the proposed surgery.    We will call her to have the procedure scheduled.    Mateo Warren DO        Again, thank you for allowing me to participate in the care of your patient.        Sincerely,        Mateo Warren DO

## 2021-09-22 NOTE — PROGRESS NOTES
Patient presented after waiting 30 minutes with no reaction to allergy injections. Discharged from clinic.    Estrada BARR RN ............   9/22/2021...2:11 PM

## 2021-09-22 NOTE — PROGRESS NOTES
Patient seen in consultation for external hemorrhoids by Tiffanie Montes    HPI:  Patient is a 51 year old female with many year history of external hemorrhoids which started when she was pregnant. She denies blood in her stool and no significant pain but she has difficulty keeping her anal area clean following a bowel movement due to the residual hemorrhoidal skin tissue. She does not take any blood thinning medications. She does not take a fiber supplement.    Review Of Systems    Skin: negative  Ears/Nose/Throat: negative  Respiratory: No shortness of breath, dyspnea on exertion, cough, or hemoptysis  Cardiovascular: negative  Gastrointestinal: as above  Genitourinary: negative  Musculoskeletal: negative  Neurologic: negative  Hematologic/Lymphatic/Immunologic: negative  Endocrine: negative      Past Medical History:   Diagnosis Date     Abnormal Pap smear, can't excl hi gd sq intraepithelial lesion (ASC-H) 3/22/07    negative colposcopy     History of colposcopy with cervical biopsy 4/23/07     Ovarian cysts      Rosacea        Past Surgical History:   Procedure Laterality Date     COLONOSCOPY  6/22/2012    Procedure: COLONOSCOPY;  Colonscopy Screening, Diverticulitis;  Surgeon: Reilly Holt MD;  Location:  GI     DILATION AND CURETTAGE, HYSTEROSCOPY, ABLATE ENDOMETRIUM NOVASURE, COMBINED  12/30/2011    Procedure:COMBINED DILATION AND CURETTAGE, HYSTEROSCOPY, ABLATE ENDOMETRIUM NOVASURE; hysteroscopy, dialtion and curettage, novasure endometrial ablation  ; Surgeon:MILAD VILLARREAL; Location: OR     GYN SURGERY  2015    [facility] REMOVAL OF TONSILS,<11 Y/O       HC REPAIR OF NASAL SEPTUM  12/30/08    Septoplasty, submucosal resection inferior turbinates.     HYSTERECTOMY, PAP NO LONGER INDICATED         Family History   Problem Relation Age of Onset     Alcohol/Drug Paternal Grandfather         alcohlism     Other Cancer Paternal Grandfather      Cancer Maternal Grandfather         gallbladder      Diabetes Maternal Grandmother              Cerebrovascular Disease Paternal Grandmother      Alzheimer Disease Paternal Grandmother         and dementia     Prostate Cancer Brother      Diabetes Mother      Alzheimer Disease Mother      Cancer Brother         skin     Hodgkin's lymphoma Brother        Social History     Socioeconomic History     Marital status: Legally      Spouse name: Sridhar     Number of children: 3     Years of education: Not on file     Highest education level: Not on file   Occupational History     Employer: SELF EMPLOYED     Employer: SELF   Tobacco Use     Smoking status: Never Smoker     Smokeless tobacco: Never Used   Substance and Sexual Activity     Alcohol use: Yes     Alcohol/week: 0.0 standard drinks     Comment: occ.     Drug use: No     Sexual activity: Yes     Partners: Male     Birth control/protection: Male Surgical, Female Surgical     Comment: ablasion and hysterectomy   Other Topics Concern      Service Not Asked     Blood Transfusions Not Asked     Caffeine Concern No     Occupational Exposure Not Asked     Hobby Hazards Not Asked     Sleep Concern No     Stress Concern Yes     Weight Concern Yes     Special Diet Not Asked     Back Care Not Asked     Exercise Yes     Bike Helmet Not Asked     Seat Belt Yes     Self-Exams Not Asked     Parent/sibling w/ CABG, MI or angioplasty before 65F 55M? No   Social History Narrative     Not on file     Social Determinants of Health     Financial Resource Strain:      Difficulty of Paying Living Expenses:    Food Insecurity:      Worried About Running Out of Food in the Last Year:      Ran Out of Food in the Last Year:    Transportation Needs:      Lack of Transportation (Medical):      Lack of Transportation (Non-Medical):    Physical Activity:      Days of Exercise per Week:      Minutes of Exercise per Session:    Stress:      Feeling of Stress :    Social Connections:      Frequency of Communication with Friends  and Family:      Frequency of Social Gatherings with Friends and Family:      Attends Spiritism Services:      Active Member of Clubs or Organizations:      Attends Club or Organization Meetings:      Marital Status:    Intimate Partner Violence:      Fear of Current or Ex-Partner:      Emotionally Abused:      Physically Abused:      Sexually Abused:        Current Outpatient Medications   Medication Sig Dispense Refill     calcium carbonate (OS-EDIS 500 MG [place]. CA) 1250 MG tablet Take 1 tablet by mouth daily       EPINEPHrine (EPIPEN 2-ROMARIO) 0.3 MG/0.3ML injection 2-pack Inject 0.3 mLs (0.3 mg) into the muscle as needed for anaphylaxis 0.6 mL 1     fexofenadine-pseudoePHEDrine (ALLEGRA-D 24) 180-240 MG 24 hr tablet Take 1 tablet by mouth daily 30 tablet 11     ibuprofen (ADVIL,MOTRIN) 200 MG tablet Take 400 mg by mouth every 4 hours as needed Reported on 2/27/2017       metroNIDAZOLE (METROCREAM) 0.75 % external cream Apply topically 2 times daily 45 g 7     montelukast (SINGULAIR) 10 MG tablet Take 1 tablet (10 mg) by mouth At Bedtime Patient needs to be seen in clinic. 30 tablet 1     Naltrexone HCl POWD        olopatadine (PATANOL) 0.1 % ophthalmic solution Place 1 drop into both eyes 2 times daily (Patient not taking: Reported on 7/16/2021) 1 Bottle 4     ORDER FOR ALLERGEN IMMUNOTHERAPY Abdiaziz, White 1:20 w/v, HS  0.5 ml  Birch Mix PRW 1:20 w/v, HS  0.5 ml  Boxelder-Maple Mix BHR (Boxelder Hard Red) 1:20 w/v, HS  0.5 ml  Cedar, Red 1:20 w/v, HS  0.5 ml  [place], Common 1:20 w/v, HS  0.5 ml  Elm, American 1:20 w/v, HS  0.5 ml  [place] Mix RW 1:20 w/v, HS 0.5 ml  Oak Mix RVW 1:20 w/v, HS 0.5 ml  Pine, White 1:20 w/v, ALK 0.5 ml  [place] Tree, Black 1:20 w/v, HS 0.5 ml  Diluent: HSA qs to 5ml 5 mL prn     ORDER FOR ALLERGEN IMMUNOTHERAPY Dog Hair-Dander, A. P.  1:100 w/v, HS  1.0 ml  Dust Mites DF. 10,000 AU/mL, HS  0.5 ml  Dust Mites DP. 10,000 AU/mL, HS  0.5 ml   Richard Grass 1:20 w/v, HS 0.5 ml  Bonilla Grass  "(Std) 100,000 BAU/mL, HS 0.4 ml  Diluent: HSA qs to 5ml 5 mL prn     ORDER FOR ALLERGEN IMMUNOTHERAPY Kochia 1:20 w/v, HS 0.5 ml  Lamb's Quarters 1:20 w/v, HS 0.5 ml  Nettle 1:20 w/v, HS 0.5 ml  Plantain, English 1:20 w/v, HS 0.5 ml  Ragweed, Mix (Giant, Short) 1:20 w/v, HS 0.5 ml  Russian Thistle 1:20 w/v, HS 0.5 ml  Sagebrush, Mugwort 1:20 w/v, HS 0.5 ml  Sorrel, Sheep 1:20 w/v, HS 0.5 ml  Diluent: HSA qs to 5ml 5 mL prn     ORDER FOR ALLERGEN IMMUNOTHERAPY Cat Hair, Standardized A.P. 10,000 BAU/mL, HS  2.0 ml   Alternaria Tenuis 1:10 w/v, HS  0.5 ml  Aspergillus Fumigatus 1:10 w/v, HS  0.5 ml  Epicoccum Nigrum 1:10 w/v, HS 0.5 ml  Penicillium Notatum 1:10 w/v, HS  0.5 ml  Diluent: HSA qs to 5ml 5 mL prn     POTASSIUM PO        progesterone POWD powder        SUMAtriptan (IMITREX) 50 MG tablet Take 1 tablet (50 mg) by mouth See Admin Instructions For ONSET OF MIGRAINE, MAY REPEAT ONCE AFTER 2 HRS. DO NOT EXCEED 4 TABLETS IN 24 HRS. 12 tablet prn     triamcinolone (NASACORT) 55 MCG/ACT nasal aerosol Spray 2 sprays into both nostrils daily 1 Bottle 11     UNABLE TO FIND Apply topically daily MEDICATION NAME: testosterone cream       VITAMIN D, CHOLECALCIFEROL, PO Take 10,000 Units by mouth daily         Medications and history reviewed    Physical exam:  Vitals: /70   Temp 99.6  F (37.6  C) (Temporal)   Ht 1.71 m (5' 7.32\")   Wt 79.8 kg (176 lb)   LMP 06/15/2015 (Approximate)   BMI 27.30 kg/m    BMI= Body mass index is 27.3 kg/m .    Constitutional: Healthy, alert, non-distressed   Head: Normo-cephalic, atraumatic, no lesions, masses or tenderness   Cardiovascular: RRR, no new murmurs, +S1, +S2 heart sounds, no clicks, rubs or gallops   Respiratory: CTAB, no rales, rhonchi or wheezing, equal chest rise, good respiratory effort   Gastrointestinal: Soft, non-tender, non distended, no rebound rigidity or guarding, no masses or hernias palpated   : Deferred  Rectal: small left anterior skin tag almost in " confluence with labia. No thrombosed hemorrhoids. NOÉ without masses or bleeding  Musculoskeletal: Moves all extremities, normal  strength, no deformities noted   Skin: No suspicious lesions or rashes   Psychiatric: Mentation appears normal, affect appropriate   Hematologic/Lymphatic/Immunologic: Normal cervical and supraclavicular lymph nodes   Patient able to get up on table without difficulty.    Labs show:  No results found for this or any previous visit (from the past 24 hour(s)).    Imaging shows:  none    Assessment:     ICD-10-CM    1. External hemorrhoids  K64.4 Adult General Surg Referral     Case Request: HEMORRHOIDECTOMY, EXTERNAL     Plan: Due to the hygiene issue with the residual hemorrhoidal tissue I recommend OR excision of her external hemorrhoid. We discussed the procedure in detail. We also discussed the risks, benefits, alternatives and post-op care and restrictions. After our informed discussion we decided to proceed with the proposed surgery.    We will call her to have the procedure scheduled.    Mateo Warren, DO

## 2021-09-23 ENCOUNTER — TELEPHONE (OUTPATIENT)
Dept: SURGERY | Facility: CLINIC | Age: 51
End: 2021-09-23

## 2021-09-23 NOTE — TELEPHONE ENCOUNTER
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

## 2021-09-27 ENCOUNTER — TELEPHONE (OUTPATIENT)
Dept: FAMILY MEDICINE | Facility: OTHER | Age: 51
End: 2021-09-27

## 2021-09-27 DIAGNOSIS — Z11.52 ENCOUNTER FOR SCREENING FOR COVID-19: Primary | ICD-10-CM

## 2021-09-27 DIAGNOSIS — Z01.818 PRE-OPERATIVE EXAMINATION: ICD-10-CM

## 2021-09-29 ENCOUNTER — ALLIED HEALTH/NURSE VISIT (OUTPATIENT)
Dept: ALLERGY | Facility: OTHER | Age: 51
End: 2021-09-29
Payer: COMMERCIAL

## 2021-09-29 DIAGNOSIS — J30.9 ALLERGIC RHINITIS: ICD-10-CM

## 2021-09-29 DIAGNOSIS — J30.89 ALLERGIC RHINITIS DUE TO DUST MITE: ICD-10-CM

## 2021-09-29 DIAGNOSIS — J30.81 ALLERGIC RHINITIS DUE TO ANIMAL DANDER: ICD-10-CM

## 2021-09-29 DIAGNOSIS — J30.89 ALLERGIC RHINITIS DUE TO MOLD: ICD-10-CM

## 2021-09-29 DIAGNOSIS — J30.1 CHRONIC SEASONAL ALLERGIC RHINITIS DUE TO POLLEN: Primary | ICD-10-CM

## 2021-09-29 PROCEDURE — 95117 IMMUNOTHERAPY INJECTIONS: CPT

## 2021-09-29 NOTE — PROGRESS NOTES
Patient presented after waiting 30 minutes with no reaction to allergy injections. Discharged from clinic.    Estrada BARR RN ............   9/29/2021...11:12 AM

## 2021-10-14 DIAGNOSIS — J30.1 CHRONIC SEASONAL ALLERGIC RHINITIS DUE TO POLLEN: ICD-10-CM

## 2021-10-15 RX ORDER — MONTELUKAST SODIUM 10 MG/1
10 TABLET ORAL AT BEDTIME
Qty: 30 TABLET | Refills: 0 | Status: SHIPPED | OUTPATIENT
Start: 2021-10-15

## 2021-10-15 NOTE — TELEPHONE ENCOUNTER
Signed Prescriptions:                        Disp   Refills    montelukast (SINGULAIR) 10 MG tablet       30 tab*0        Sig: Take 1 tablet (10 mg) by mouth At Bedtime Patient           needs to be seen in clinic.  Authorizing Provider: KEVIN MARIN  Ordering User: ÁLVARO GLASER    Medication is being filled for 1 time refill only due to:  Patient needs to be seen because it has been more than one year since last visit.    Álvaro Glaser, RN, MSN

## 2021-10-19 NOTE — H&P (VIEW-ONLY)
[address]  [facility] [address]  Phone: [phone]  Primary Provider: Tiffanie Montes  Pre-op Performing Provider: ALIA JEAN      PREOPERATIVE EVALUATION:  Today's date: 10/22/2021    Sumaya Gatica is a 51 year old female who presents for a preoperative evaluation.    Surgical Information:  Surgery/Procedure: HEMORRHOIDECTOMY, EXTERNAL  Surgery Location: [facility]  Surgeon: Dr. Warren  Surgery Date: 10/25  Time of Surgery: 11:30AM  Where patient plans to recover: At home with family  Fax number for surgical facility: Note does not need to be faxed, will be available electronically in Epic.    Type of Anesthesia Anticipated: Choice    Assessment & Plan     The proposed surgical procedure is considered LOW risk.    (Z01.818) Preop general physical exam  (primary encounter diagnosis)  (K64.4) External hemorrhoids  (Z12.11) Special screening for malignant neoplasms, colon  Comment: Proceed with surgical intervention without hesitation or reservation.  Plan: Adult Gastro Ref - Procedure Only              Possible Sleep Apnea: Occasional snoring       Risks and Recommendations:  The patient has the following additional risks and recommendations for perioperative complications:   - No identified additional risk factors other than previously addressed    Medication Instructions:  Patient is to take all scheduled medications on the day of surgery    RECOMMENDATION:  APPROVAL GIVEN to proceed with proposed procedure, without further diagnostic evaluation.    Review of external notes as documented above       Diagnosis or treatment significantly limited by social determinants of health -       Subjective     HPI related to upcoming procedure: Hemorrhoids in need of removal/treatment    Preop Questions 10/15/2021   1. Have you ever had a heart attack or stroke? No   2. Have you ever had surgery on your heart or blood vessels, such as a stent placement, a  coronary artery bypass, or surgery on an artery in your head, neck, heart, or legs? No   3. Do you have chest pain with activity? No   4. Do you have a history of  heart failure? No   5. Do you currently have a cold, bronchitis or symptoms of other infection? No   6. Do you have a cough, shortness of breath, or wheezing? No   7. Do you or anyone in your family have previous history of blood clots? No   8. Do you or does anyone in your family have a serious bleeding problem such as prolonged bleeding following surgeries or cuts? No   9. Have you ever had problems with anemia or been told to take iron pills? No   10. Have you had any abnormal blood loss such as black, tarry or bloody stools, or abnormal vaginal bleeding? No   11. Have you ever had a blood transfusion? No   12. Are you willing to have a blood transfusion if it is medically needed before, during, or after your surgery? Yes   13. Have you or any of your relatives ever had problems with anesthesia? No   14. Do you have sleep apnea, excessive snoring or daytime drowsiness? YES -some snoring noted   14a. Do you have a CPAP machine? Yes   15. Do you have any artifical heart valves or other implanted medical devices like a pacemaker, defibrillator, or continuous glucose monitor? No   16. Do you have artificial joints? No   17. Are you allergic to latex? No   18. Is there any chance that you may be pregnant? No       Health Care Directive:  Patient does not have a Health Care Directive or Living Will:     Preoperative Review of :   reviewed - no record of controlled substances prescribed.      Review of Systems  CONSTITUTIONAL: NEGATIVE for fever, chills, change in weight  INTEGUMENTARY/SKIN: NEGATIVE for worrisome rashes, moles or lesions  EYES: NEGATIVE for vision changes or irritation  ENT/MOUTH: NEGATIVE for ear, mouth and throat problems  RESP: NEGATIVE for significant cough or SOB  CV: NEGATIVE for chest pain, palpitations or peripheral edema  GI:  NEGATIVE for nausea, abdominal pain, heartburn, or change in bowel habits  : NEGATIVE for frequency, dysuria, or hematuria  MUSCULOSKELETAL: NEGATIVE for significant arthralgias or myalgia  NEURO: NEGATIVE for weakness, dizziness or paresthesias  ENDOCRINE: NEGATIVE for temperature intolerance, skin/hair changes  HEME: NEGATIVE for bleeding problems  PSYCHIATRIC: NEGATIVE for changes in mood or affect    Patient Active Problem List    Diagnosis Date Noted     Allergic rhinitis due to mold 03/03/2020     Priority: Medium     Allergic rhinitis due to dust mite 03/03/2020     Priority: Medium     Allergic rhinitis due to animal dander 03/03/2020     Priority: Medium     Chronic seasonal allergic rhinitis due to pollen 03/27/2018     Priority: Medium     Tree nut allergy 03/27/2018     Priority: Medium     Shellfish allergy 03/27/2018     Priority: Medium     Insomnia, unspecified type 05/25/2016     Priority: Medium     Uterine prolapse 11/24/2015     Priority: Medium     Female stress incontinence 06/01/2015     Priority: Medium     Urinary urgency 06/01/2015     Priority: Medium     Migraine without aura and without status migrainosus, not intractable 05/21/2015     Priority: Medium     Acne vulgaris 10/28/2011     Priority: Medium     Rosacea 10/14/2011     Priority: Medium     Dermatitis 12/01/2010     Priority: Medium     CARDIOVASCULAR SCREENING; LDL GOAL LESS THAN 160 10/31/2010     Priority: Medium     Anxiety 12/09/2009     Priority: Medium     Lumbago 09/11/2007     Priority: Medium     Abnormal glandular Papanicolaou smear of cervix 04/23/2007     Priority: Medium     3/22/07 ASC-H  4/23/07 NIL/ neg HPV/ ECC (negative)  12/6/07 NIL  4/17/08 NIL  4/23/09 NIL  7/23/10 NIL       Allergic rhinitis 03/26/2007     Priority: Medium     Problem list name updated by automated process. Provider to review       Herpes simplex virus (HSV) infection 03/26/2007     Priority: Medium     Problem list name updated by  automated process. Provider to review        Past Medical History:   Diagnosis Date     Abnormal Pap smear, can't excl hi gd sq intraepithelial lesion (ASC-H) 3/22/07    negative colposcopy     History of colposcopy with cervical biopsy 4/23/07     Ovarian cysts      Rosacea      Past Surgical History:   Procedure Laterality Date     COLONOSCOPY  6/22/2012    Procedure: COLONOSCOPY;  Colonscopy Screening, Diverticulitis;  Surgeon: Reilly Holt MD;  Location:  GI     DILATION AND CURETTAGE, HYSTEROSCOPY, ABLATE ENDOMETRIUM NOVASURE, COMBINED  12/30/2011    Procedure:COMBINED DILATION AND CURETTAGE, HYSTEROSCOPY, ABLATE ENDOMETRIUM NOVASURE; hysteroscopy, dialtion and curettage, novasure endometrial ablation  ; Surgeon:MILAD VILLARREAL; Location: OR     GYN SURGERY  2015    Hy      HC REMOVAL OF TONSILS,<13 Y/O       HC REPAIR OF NASAL SEPTUM  12/30/08    Septoplasty, submucosal resection inferior turbinates.     HYSTERECTOMY, PAP NO LONGER INDICATED       Current Outpatient Medications   Medication Sig Dispense Refill     calcium carbonate (OS-EDIS 500 MG [place]. CA) 1250 MG tablet Take 1 tablet by mouth daily       EPINEPHrine (EPIPEN 2-ROMARIO) 0.3 MG/0.3ML injection 2-pack Inject 0.3 mLs (0.3 mg) into the muscle as needed for anaphylaxis 0.6 mL 1     fexofenadine-pseudoePHEDrine (ALLEGRA-D 24) 180-240 MG 24 hr tablet Take 1 tablet by mouth daily 30 tablet 11     ibuprofen (ADVIL,MOTRIN) 200 MG tablet Take 400 mg by mouth every 4 hours as needed Reported on 2/27/2017       metroNIDAZOLE (METROCREAM) 0.75 % external cream Apply topically 2 times daily 45 g 7     montelukast (SINGULAIR) 10 MG tablet Take 1 tablet (10 mg) by mouth At Bedtime Patient needs to be seen in clinic. 30 tablet 0     Naltrexone HCl POWD        olopatadine (PATANOL) 0.1 % ophthalmic solution Place 1 drop into both eyes 2 times daily (Patient not taking: Reported on 7/16/2021) 1 Bottle 4     ORDER FOR ALLERGEN IMMUNOTHERAPY Abdiaziz, White 1:20  w/v, HS  0.5 ml  Birch Mix PRW 1:20 w/v, HS  0.5 ml  Boxelder-Maple Mix BHR (Boxelder Hard Red) 1:20 w/v, HS  0.5 ml  Cedar, Red 1:20 w/v, HS  0.5 ml  [place], Common 1:20 w/v, HS  0.5 ml  Elm, American 1:20 w/v, HS  0.5 ml  [place] Mix RW 1:20 w/v, HS 0.5 ml  Oak Mix RVW 1:20 w/v, HS 0.5 ml  Pine, White 1:20 w/v, ALK 0.5 ml  [place] Tree, Black 1:20 w/v, HS 0.5 ml  Diluent: HSA qs to 5ml 5 mL prn     ORDER FOR ALLERGEN IMMUNOTHERAPY Dog Hair-Dander, A. P.  1:100 w/v, HS  1.0 ml  Dust Mites DF. 10,000 AU/mL, HS  0.5 ml  Dust Mites DP. 10,000 AU/mL, HS  0.5 ml   Richard Grass 1:20 w/v, HS 0.5 ml  Bonilla Grass (Std) 100,000 BAU/mL, HS 0.4 ml  Diluent: HSA qs to 5ml 5 mL prn     ORDER FOR ALLERGEN IMMUNOTHERAPY Kochia 1:20 w/v, HS 0.5 ml  Lamb's Quarters 1:20 w/v, HS 0.5 ml  Nettle 1:20 w/v, HS 0.5 ml  Plantain, English 1:20 w/v, HS 0.5 ml  Ragweed, Mix (Giant, Short) 1:20 w/v, HS 0.5 ml  Russian Thistle 1:20 w/v, HS 0.5 ml  Sagebrush, Mugwort 1:20 w/v, HS 0.5 ml  Sorrel, Sheep 1:20 w/v, HS 0.5 ml  Diluent: HSA qs to 5ml 5 mL prn     ORDER FOR ALLERGEN IMMUNOTHERAPY Cat Hair, Standardized A.P. 10,000 BAU/mL, HS  2.0 ml   Alternaria Tenuis 1:10 w/v, HS  0.5 ml  Aspergillus Fumigatus 1:10 w/v, HS  0.5 ml  Epicoccum Nigrum 1:10 w/v, HS 0.5 ml  Penicillium Notatum 1:10 w/v, HS  0.5 ml  Diluent: HSA qs to 5ml 5 mL prn     POTASSIUM PO        progesterone POWD powder        SUMAtriptan (IMITREX) 50 MG tablet Take 1 tablet (50 mg) by mouth See Admin Instructions For ONSET OF MIGRAINE, MAY REPEAT ONCE AFTER 2 HRS. DO NOT EXCEED 4 TABLETS IN 24 HRS. 12 tablet prn     triamcinolone (NASACORT) 55 MCG/ACT nasal aerosol Spray 2 sprays into both nostrils daily 1 Bottle 11     UNABLE TO FIND Apply topically daily MEDICATION NAME: testosterone cream       VITAMIN D, CHOLECALCIFEROL, PO Take 10,000 Units by mouth daily         Allergies   Allergen Reactions     No Known Drug Allergies      Peanuts [Nuts] Hives     Seasonal  Allergies      Shellfish-Derived Products Itching        Social History     Tobacco Use     Smoking status: Never Smoker     Smokeless tobacco: Never Used   Substance Use Topics     Alcohol use: Yes     Alcohol/week: 0.0 standard drinks     Comment: occ.     Family History   Problem Relation Age of Onset     Alcohol/Drug Paternal Grandfather         alcohlism     Other Cancer Paternal Grandfather      Cancer Maternal Grandfather         gallbladder     Diabetes Maternal Grandmother              Cerebrovascular Disease Paternal Grandmother      Alzheimer Disease Paternal Grandmother         and dementia     Prostate Cancer Brother      Diabetes Mother      Alzheimer Disease Mother      Cancer Brother         skin     Hodgkin's lymphoma Brother      History   Drug Use No         Objective     LMP 06/15/2015 (Approximate)     Physical Exam    GENERAL APPEARANCE: healthy, alert and no distress     EYES: EOMI, PERRL     HENT: ear canals and TM's normal and nose and mouth without ulcers or lesions     NECK: no adenopathy, no asymmetry, masses, or scars and thyroid normal to palpation     RESP: lungs clear to auscultation - no rales, rhonchi or wheezes     CV: regular rates and rhythm, normal S1 S2, no S3 or S4 and no murmur, click or rub     ABDOMEN:  soft, nontender, no HSM or masses and bowel sounds normal     MS: extremities normal- no gross deformities noted, no evidence of inflammation in joints, FROM in all extremities.     SKIN: no suspicious lesions or rashes     NEURO: Normal strength and tone, sensory exam grossly normal, mentation intact and speech normal     PSYCH: mentation appears normal. and affect normal/bright     LYMPHATICS: No cervical adenopathy    Recent Labs   Lab Test 21  0747   HGB 13.9         POTASSIUM 4.0   CR 0.83        Diagnostics:  Labs pending at this time.  Results will be reviewed when available.   No EKG required for low risk surgery (cataract, skin procedure,  breast biopsy, etc).    Revised Cardiac Risk Index (RCRI):  The patient has the following serious cardiovascular risks for perioperative complications:   - No serious cardiac risks = 0 points     RCRI Interpretation: 1 point: Class II (low risk - 0.9% complication rate)           Signed Electronically by: Dustin Paiz PA-C  Copy of this evaluation report is provided to requesting physician.

## 2021-10-19 NOTE — PATIENT INSTRUCTIONS

## 2021-10-19 NOTE — PROGRESS NOTES
[address]  [facility] [address]  Phone: [phone]  Primary Provider: Tiffanie Montes  Pre-op Performing Provider: ALIA JEAN      PREOPERATIVE EVALUATION:  Today's date: 10/22/2021    Sumaya Gatica is a 51 year old female who presents for a preoperative evaluation.    Surgical Information:  Surgery/Procedure: HEMORRHOIDECTOMY, EXTERNAL  Surgery Location: [facility]  Surgeon: Dr. Warren  Surgery Date: 10/25  Time of Surgery: 11:30AM  Where patient plans to recover: At home with family  Fax number for surgical facility: Note does not need to be faxed, will be available electronically in Epic.    Type of Anesthesia Anticipated: Choice    Assessment & Plan     The proposed surgical procedure is considered LOW risk.    (Z01.818) Preop general physical exam  (primary encounter diagnosis)  (K64.4) External hemorrhoids  (Z12.11) Special screening for malignant neoplasms, colon  Comment: Proceed with surgical intervention without hesitation or reservation.  Plan: Adult Gastro Ref - Procedure Only              Possible Sleep Apnea: Occasional snoring       Risks and Recommendations:  The patient has the following additional risks and recommendations for perioperative complications:   - No identified additional risk factors other than previously addressed    Medication Instructions:  Patient is to take all scheduled medications on the day of surgery    RECOMMENDATION:  APPROVAL GIVEN to proceed with proposed procedure, without further diagnostic evaluation.    Review of external notes as documented above       Diagnosis or treatment significantly limited by social determinants of health -       Subjective     HPI related to upcoming procedure: Hemorrhoids in need of removal/treatment    Preop Questions 10/15/2021   1. Have you ever had a heart attack or stroke? No   2. Have you ever had surgery on your heart or blood vessels, such as a stent placement, a  coronary artery bypass, or surgery on an artery in your head, neck, heart, or legs? No   3. Do you have chest pain with activity? No   4. Do you have a history of  heart failure? No   5. Do you currently have a cold, bronchitis or symptoms of other infection? No   6. Do you have a cough, shortness of breath, or wheezing? No   7. Do you or anyone in your family have previous history of blood clots? No   8. Do you or does anyone in your family have a serious bleeding problem such as prolonged bleeding following surgeries or cuts? No   9. Have you ever had problems with anemia or been told to take iron pills? No   10. Have you had any abnormal blood loss such as black, tarry or bloody stools, or abnormal vaginal bleeding? No   11. Have you ever had a blood transfusion? No   12. Are you willing to have a blood transfusion if it is medically needed before, during, or after your surgery? Yes   13. Have you or any of your relatives ever had problems with anesthesia? No   14. Do you have sleep apnea, excessive snoring or daytime drowsiness? YES -some snoring noted   14a. Do you have a CPAP machine? Yes   15. Do you have any artifical heart valves or other implanted medical devices like a pacemaker, defibrillator, or continuous glucose monitor? No   16. Do you have artificial joints? No   17. Are you allergic to latex? No   18. Is there any chance that you may be pregnant? No       Health Care Directive:  Patient does not have a Health Care Directive or Living Will:     Preoperative Review of :   reviewed - no record of controlled substances prescribed.      Review of Systems  CONSTITUTIONAL: NEGATIVE for fever, chills, change in weight  INTEGUMENTARY/SKIN: NEGATIVE for worrisome rashes, moles or lesions  EYES: NEGATIVE for vision changes or irritation  ENT/MOUTH: NEGATIVE for ear, mouth and throat problems  RESP: NEGATIVE for significant cough or SOB  CV: NEGATIVE for chest pain, palpitations or peripheral edema  GI:  NEGATIVE for nausea, abdominal pain, heartburn, or change in bowel habits  : NEGATIVE for frequency, dysuria, or hematuria  MUSCULOSKELETAL: NEGATIVE for significant arthralgias or myalgia  NEURO: NEGATIVE for weakness, dizziness or paresthesias  ENDOCRINE: NEGATIVE for temperature intolerance, skin/hair changes  HEME: NEGATIVE for bleeding problems  PSYCHIATRIC: NEGATIVE for changes in mood or affect    Patient Active Problem List    Diagnosis Date Noted     Allergic rhinitis due to mold 03/03/2020     Priority: Medium     Allergic rhinitis due to dust mite 03/03/2020     Priority: Medium     Allergic rhinitis due to animal dander 03/03/2020     Priority: Medium     Chronic seasonal allergic rhinitis due to pollen 03/27/2018     Priority: Medium     Tree nut allergy 03/27/2018     Priority: Medium     Shellfish allergy 03/27/2018     Priority: Medium     Insomnia, unspecified type 05/25/2016     Priority: Medium     Uterine prolapse 11/24/2015     Priority: Medium     Female stress incontinence 06/01/2015     Priority: Medium     Urinary urgency 06/01/2015     Priority: Medium     Migraine without aura and without status migrainosus, not intractable 05/21/2015     Priority: Medium     Acne vulgaris 10/28/2011     Priority: Medium     Rosacea 10/14/2011     Priority: Medium     Dermatitis 12/01/2010     Priority: Medium     CARDIOVASCULAR SCREENING; LDL GOAL LESS THAN 160 10/31/2010     Priority: Medium     Anxiety 12/09/2009     Priority: Medium     Lumbago 09/11/2007     Priority: Medium     Abnormal glandular Papanicolaou smear of cervix 04/23/2007     Priority: Medium     3/22/07 ASC-H  4/23/07 NIL/ neg HPV/ ECC (negative)  12/6/07 NIL  4/17/08 NIL  4/23/09 NIL  7/23/10 NIL       Allergic rhinitis 03/26/2007     Priority: Medium     Problem list name updated by automated process. Provider to review       Herpes simplex virus (HSV) infection 03/26/2007     Priority: Medium     Problem list name updated by  automated process. Provider to review        Past Medical History:   Diagnosis Date     Abnormal Pap smear, can't excl hi gd sq intraepithelial lesion (ASC-H) 3/22/07    negative colposcopy     History of colposcopy with cervical biopsy 4/23/07     Ovarian cysts      Rosacea      Past Surgical History:   Procedure Laterality Date     COLONOSCOPY  6/22/2012    Procedure: COLONOSCOPY;  Colonscopy Screening, Diverticulitis;  Surgeon: Reilly Holt MD;  Location:  GI     DILATION AND CURETTAGE, HYSTEROSCOPY, ABLATE ENDOMETRIUM NOVASURE, COMBINED  12/30/2011    Procedure:COMBINED DILATION AND CURETTAGE, HYSTEROSCOPY, ABLATE ENDOMETRIUM NOVASURE; hysteroscopy, dialtion and curettage, novasure endometrial ablation  ; Surgeon:MILAD VILLARREAL; Location: OR     GYN SURGERY  2015    Hy      HC REMOVAL OF TONSILS,<11 Y/O       HC REPAIR OF NASAL SEPTUM  12/30/08    Septoplasty, submucosal resection inferior turbinates.     HYSTERECTOMY, PAP NO LONGER INDICATED       Current Outpatient Medications   Medication Sig Dispense Refill     calcium carbonate (OS-EDIS 500 MG [place]. [place]) 1250 MG tablet Take 1 tablet by mouth daily       EPINEPHrine (EPIPEN 2-ROMARIO) 0.3 MG/0.3ML injection 2-pack Inject 0.3 mLs (0.3 mg) into the muscle as needed for anaphylaxis 0.6 mL 1     fexofenadine-pseudoePHEDrine (ALLEGRA-D 24) 180-240 MG 24 hr tablet Take 1 tablet by mouth daily 30 tablet 11     ibuprofen (ADVIL,MOTRIN) 200 MG tablet Take 400 mg by mouth every 4 hours as needed Reported on 2/27/2017       metroNIDAZOLE (METROCREAM) 0.75 % external cream Apply topically 2 times daily 45 g 7     montelukast (SINGULAIR) 10 MG tablet Take 1 tablet (10 mg) by mouth At Bedtime Patient needs to be seen in clinic. 30 tablet 0     Naltrexone HCl POWD        olopatadine (PATANOL) 0.1 % ophthalmic solution Place 1 drop into both eyes 2 times daily (Patient not taking: Reported on 7/16/2021) 1 Bottle 4     ORDER FOR ALLERGEN IMMUNOTHERAPY Abdiaziz, White 1:20  w/v, HS  0.5 ml  Birch Mix PRW 1:20 w/v, HS  0.5 ml  Boxelder-Maple Mix BHR (Boxelder Hard Red) 1:20 w/v, HS  0.5 ml  Cedar, Red 1:20 w/v, HS  0.5 ml  Grandy, Common 1:20 w/v, HS  0.5 ml  Elm, American 1:20 w/v, HS  0.5 ml  [place] Mix RW 1:20 w/v, HS 0.5 ml  Oak Mix RVW 1:20 w/v, HS 0.5 ml  Pine, White 1:20 w/v, ALK 0.5 ml  [place] Tree, Black 1:20 w/v, HS 0.5 ml  Diluent: HSA qs to 5ml 5 mL prn     ORDER FOR ALLERGEN IMMUNOTHERAPY Dog Hair-Dander, A. P.  1:100 w/v, HS  1.0 ml  Dust Mites DF. 10,000 AU/mL, HS  0.5 ml  Dust Mites DP. 10,000 AU/mL, HS  0.5 ml   Richard Grass 1:20 w/v, HS 0.5 ml  Bonilla Grass (Std) 100,000 BAU/mL, HS 0.4 ml  Diluent: HSA qs to 5ml 5 mL prn     ORDER FOR ALLERGEN IMMUNOTHERAPY Kochia 1:20 w/v, HS 0.5 ml  Lamb's Quarters 1:20 w/v, HS 0.5 ml  Nettle 1:20 w/v, HS 0.5 ml  Plantain, English 1:20 w/v, HS 0.5 ml  Ragweed, Mix (Giant, Short) 1:20 w/v, HS 0.5 ml  Russian Thistle 1:20 w/v, HS 0.5 ml  Sagebrush, Mugwort 1:20 w/v, HS 0.5 ml  Sorrel, Sheep 1:20 w/v, HS 0.5 ml  Diluent: HSA qs to 5ml 5 mL prn     ORDER FOR ALLERGEN IMMUNOTHERAPY Cat Hair, Standardized A.P. 10,000 BAU/mL, HS  2.0 ml   Alternaria Tenuis 1:10 w/v, HS  0.5 ml  Aspergillus Fumigatus 1:10 w/v, HS  0.5 ml  Epicoccum Nigrum 1:10 w/v, HS 0.5 ml  Penicillium Notatum 1:10 w/v, HS  0.5 ml  Diluent: HSA qs to 5ml 5 mL prn     POTASSIUM PO        progesterone POWD powder        SUMAtriptan (IMITREX) 50 MG tablet Take 1 tablet (50 mg) by mouth See Admin Instructions For ONSET OF MIGRAINE, MAY REPEAT ONCE AFTER 2 HRS. DO NOT EXCEED 4 TABLETS IN 24 HRS. 12 tablet prn     triamcinolone (NASACORT) 55 MCG/ACT nasal aerosol Spray 2 sprays into both nostrils daily 1 Bottle 11     UNABLE TO FIND Apply topically daily MEDICATION NAME: testosterone cream       VITAMIN D, CHOLECALCIFEROL, PO Take 10,000 Units by mouth daily         Allergies   Allergen Reactions     No Known Drug Allergies      Peanuts [Nuts] Hives     Seasonal  Allergies      Shellfish-Derived Products Itching        Social History     Tobacco Use     Smoking status: Never Smoker     Smokeless tobacco: Never Used   Substance Use Topics     Alcohol use: Yes     Alcohol/week: 0.0 standard drinks     Comment: occ.     Family History   Problem Relation Age of Onset     Alcohol/Drug Paternal Grandfather         alcohlism     Other Cancer Paternal Grandfather      Cancer Maternal Grandfather         gallbladder     Diabetes Maternal Grandmother              Cerebrovascular Disease Paternal Grandmother      Alzheimer Disease Paternal Grandmother         and dementia     Prostate Cancer Brother      Diabetes Mother      Alzheimer Disease Mother      Cancer Brother         skin     Hodgkin's lymphoma Brother      History   Drug Use No         Objective     LMP 06/15/2015 (Approximate)     Physical Exam    GENERAL APPEARANCE: healthy, alert and no distress     EYES: EOMI, PERRL     HENT: ear canals and TM's normal and nose and mouth without ulcers or lesions     NECK: no adenopathy, no asymmetry, masses, or scars and thyroid normal to palpation     RESP: lungs clear to auscultation - no rales, rhonchi or wheezes     CV: regular rates and rhythm, normal S1 S2, no S3 or S4 and no murmur, click or rub     ABDOMEN:  soft, nontender, no HSM or masses and bowel sounds normal     MS: extremities normal- no gross deformities noted, no evidence of inflammation in joints, FROM in all extremities.     SKIN: no suspicious lesions or rashes     NEURO: Normal strength and tone, sensory exam grossly normal, mentation intact and speech normal     PSYCH: mentation appears normal. and affect normal/bright     LYMPHATICS: No cervical adenopathy    Recent Labs   Lab Test 21  0747   HGB 13.9         POTASSIUM 4.0   CR 0.83        Diagnostics:  Labs pending at this time.  Results will be reviewed when available.   No EKG required for low risk surgery (cataract, skin procedure,  breast biopsy, etc).    Revised Cardiac Risk Index (RCRI):  The patient has the following serious cardiovascular risks for perioperative complications:   - No serious cardiac risks = 0 points     RCRI Interpretation: 1 point: Class II (low risk - 0.9% complication rate)           Signed Electronically by: Dustin Paiz PA-C  Copy of this evaluation report is provided to requesting physician.

## 2021-10-22 ENCOUNTER — OFFICE VISIT (OUTPATIENT)
Dept: FAMILY MEDICINE | Facility: OTHER | Age: 51
End: 2021-10-22
Payer: COMMERCIAL

## 2021-10-22 ENCOUNTER — LAB (OUTPATIENT)
Dept: LAB | Facility: OTHER | Age: 51
End: 2021-10-22
Payer: COMMERCIAL

## 2021-10-22 VITALS
BODY MASS INDEX: 27.85 KG/M2 | TEMPERATURE: 99.3 F | OXYGEN SATURATION: 100 % | HEART RATE: 94 BPM | SYSTOLIC BLOOD PRESSURE: 100 MMHG | WEIGHT: 179.5 LBS | RESPIRATION RATE: 16 BRPM | DIASTOLIC BLOOD PRESSURE: 62 MMHG

## 2021-10-22 DIAGNOSIS — Z12.11 SPECIAL SCREENING FOR MALIGNANT NEOPLASMS, COLON: ICD-10-CM

## 2021-10-22 DIAGNOSIS — Z01.818 PRE-OPERATIVE EXAMINATION: ICD-10-CM

## 2021-10-22 DIAGNOSIS — Z11.52 ENCOUNTER FOR SCREENING FOR COVID-19: ICD-10-CM

## 2021-10-22 DIAGNOSIS — K64.4 EXTERNAL HEMORRHOIDS: ICD-10-CM

## 2021-10-22 DIAGNOSIS — Z01.818 PREOP GENERAL PHYSICAL EXAM: Primary | ICD-10-CM

## 2021-10-22 PROCEDURE — 99214 OFFICE O/P EST MOD 30 MIN: CPT | Performed by: PHYSICIAN ASSISTANT

## 2021-10-22 PROCEDURE — U0005 INFEC AGEN DETEC AMPLI PROBE: HCPCS

## 2021-10-22 PROCEDURE — U0003 INFECTIOUS AGENT DETECTION BY NUCLEIC ACID (DNA OR RNA); SEVERE ACUTE RESPIRATORY SYNDROME CORONAVIRUS 2 (SARS-COV-2) (CORONAVIRUS DISEASE [COVID-19]), AMPLIFIED PROBE TECHNIQUE, MAKING USE OF HIGH THROUGHPUT TECHNOLOGIES AS DESCRIBED BY CMS-2020-01-R: HCPCS

## 2021-10-22 ASSESSMENT — PATIENT HEALTH QUESTIONNAIRE - PHQ9
5. POOR APPETITE OR OVEREATING: MORE THAN HALF THE DAYS
SUM OF ALL RESPONSES TO PHQ QUESTIONS 1-9: 9

## 2021-10-22 ASSESSMENT — ANXIETY QUESTIONNAIRES
IF YOU CHECKED OFF ANY PROBLEMS ON THIS QUESTIONNAIRE, HOW DIFFICULT HAVE THESE PROBLEMS MADE IT FOR YOU TO DO YOUR WORK, TAKE CARE OF THINGS AT HOME, OR GET ALONG WITH OTHER PEOPLE: SOMEWHAT DIFFICULT
2. NOT BEING ABLE TO STOP OR CONTROL WORRYING: SEVERAL DAYS
6. BECOMING EASILY ANNOYED OR IRRITABLE: SEVERAL DAYS
1. FEELING NERVOUS, ANXIOUS, OR ON EDGE: SEVERAL DAYS
3. WORRYING TOO MUCH ABOUT DIFFERENT THINGS: SEVERAL DAYS
5. BEING SO RESTLESS THAT IT IS HARD TO SIT STILL: MORE THAN HALF THE DAYS
GAD7 TOTAL SCORE: 8
7. FEELING AFRAID AS IF SOMETHING AWFUL MIGHT HAPPEN: NOT AT ALL

## 2021-10-23 ENCOUNTER — HEALTH MAINTENANCE LETTER (OUTPATIENT)
Age: 51
End: 2021-10-23

## 2021-10-23 LAB — SARS-COV-2 RNA RESP QL NAA+PROBE: NEGATIVE

## 2021-10-23 ASSESSMENT — ANXIETY QUESTIONNAIRES: GAD7 TOTAL SCORE: 8

## 2021-10-25 ENCOUNTER — ANESTHESIA (OUTPATIENT)
Dept: SURGERY | Facility: CLINIC | Age: 51
End: 2021-10-25
Payer: COMMERCIAL

## 2021-10-25 ENCOUNTER — ANESTHESIA EVENT (OUTPATIENT)
Dept: SURGERY | Facility: CLINIC | Age: 51
End: 2021-10-25
Payer: COMMERCIAL

## 2021-10-25 ENCOUNTER — HOSPITAL ENCOUNTER (OUTPATIENT)
Facility: CLINIC | Age: 51
Discharge: HOME | End: 2021-10-25
Attending: SURGERY | Admitting: SURGERY
Payer: COMMERCIAL

## 2021-10-25 VITALS
SYSTOLIC BLOOD PRESSURE: 128 MMHG | TEMPERATURE: 97.3 F | DIASTOLIC BLOOD PRESSURE: 81 MMHG | OXYGEN SATURATION: 97 % | RESPIRATION RATE: 16 BRPM | HEART RATE: 87 BPM

## 2021-10-25 DIAGNOSIS — K64.4 EXTERNAL HEMORRHOIDS: ICD-10-CM

## 2021-10-25 DIAGNOSIS — Z98.890 S/P HEMORRHOIDECTOMY: Primary | ICD-10-CM

## 2021-10-25 DIAGNOSIS — Z87.19 S/P HEMORRHOIDECTOMY: Primary | ICD-10-CM

## 2021-10-25 PROCEDURE — 370N000017 HC ANESTHESIA TECHNICAL FEE, PER MIN: Performed by: SURGERY

## 2021-10-25 PROCEDURE — 360N000075 HC SURGERY LEVEL 2, PER MIN: Performed by: SURGERY

## 2021-10-25 PROCEDURE — 46220 EXCISE ANAL EXT TAG/PAPILLA: CPT | Performed by: SURGERY

## 2021-10-25 PROCEDURE — 250N000009 HC RX 250: Performed by: NURSE ANESTHETIST, CERTIFIED REGISTERED

## 2021-10-25 PROCEDURE — 710N000012 HC RECOVERY PHASE 2, PER MINUTE: Performed by: SURGERY

## 2021-10-25 PROCEDURE — 258N000003 HC RX IP 258 OP 636: Performed by: NURSE ANESTHETIST, CERTIFIED REGISTERED

## 2021-10-25 PROCEDURE — 710N000010 HC RECOVERY PHASE 1, LEVEL 2, PER MIN: Performed by: SURGERY

## 2021-10-25 PROCEDURE — 250N000009 HC RX 250: Performed by: SURGERY

## 2021-10-25 PROCEDURE — 999N000141 HC STATISTIC PRE-PROCEDURE NURSING ASSESSMENT: Performed by: SURGERY

## 2021-10-25 PROCEDURE — 250N000011 HC RX IP 250 OP 636: Performed by: NURSE ANESTHETIST, CERTIFIED REGISTERED

## 2021-10-25 PROCEDURE — 272N000001 HC OR GENERAL SUPPLY STERILE: Performed by: SURGERY

## 2021-10-25 PROCEDURE — 88304 TISSUE EXAM BY PATHOLOGIST: CPT | Mod: TC | Performed by: SURGERY

## 2021-10-25 PROCEDURE — 250N000026 HC DESFLURANE, PER MIN: Performed by: SURGERY

## 2021-10-25 RX ORDER — OXYCODONE HYDROCHLORIDE 5 MG/1
5-10 TABLET ORAL
Qty: 12 TABLET | Refills: 0 | Status: SHIPPED | OUTPATIENT
Start: 2021-10-25

## 2021-10-25 RX ORDER — SODIUM CHLORIDE, SODIUM LACTATE, POTASSIUM CHLORIDE, CALCIUM CHLORIDE 600; 310; 30; 20 MG/100ML; MG/100ML; MG/100ML; MG/100ML
INJECTION, SOLUTION INTRAVENOUS CONTINUOUS
Status: DISCONTINUED | OUTPATIENT
Start: 2021-10-25 | End: 2021-10-25 | Stop reason: HOSPADM

## 2021-10-25 RX ORDER — FENTANYL CITRATE 50 UG/ML
50 INJECTION, SOLUTION INTRAMUSCULAR; INTRAVENOUS
Status: DISCONTINUED | OUTPATIENT
Start: 2021-10-25 | End: 2021-10-25 | Stop reason: HOSPADM

## 2021-10-25 RX ORDER — OXYCODONE HYDROCHLORIDE 5 MG/1
5 TABLET ORAL
Status: DISCONTINUED | OUTPATIENT
Start: 2021-10-25 | End: 2021-10-25 | Stop reason: HOSPADM

## 2021-10-25 RX ORDER — DEXAMETHASONE SODIUM PHOSPHATE 10 MG/ML
INJECTION, SOLUTION INTRAMUSCULAR; INTRAVENOUS PRN
Status: DISCONTINUED | OUTPATIENT
Start: 2021-10-25 | End: 2021-10-25

## 2021-10-25 RX ORDER — ONDANSETRON 4 MG/1
4 TABLET, ORALLY DISINTEGRATING ORAL EVERY 30 MIN PRN
Status: DISCONTINUED | OUTPATIENT
Start: 2021-10-25 | End: 2021-10-25 | Stop reason: HOSPADM

## 2021-10-25 RX ORDER — DIMENHYDRINATE 50 MG/ML
25 INJECTION, SOLUTION INTRAMUSCULAR; INTRAVENOUS
Status: DISCONTINUED | OUTPATIENT
Start: 2021-10-25 | End: 2021-10-25 | Stop reason: HOSPADM

## 2021-10-25 RX ORDER — LIDOCAINE HYDROCHLORIDE 20 MG/ML
INJECTION, SOLUTION INFILTRATION; PERINEURAL PRN
Status: DISCONTINUED | OUTPATIENT
Start: 2021-10-25 | End: 2021-10-25

## 2021-10-25 RX ORDER — HYDROMORPHONE HYDROCHLORIDE 1 MG/ML
0.5 INJECTION, SOLUTION INTRAMUSCULAR; INTRAVENOUS; SUBCUTANEOUS EVERY 5 MIN PRN
Status: DISCONTINUED | OUTPATIENT
Start: 2021-10-25 | End: 2021-10-25 | Stop reason: HOSPADM

## 2021-10-25 RX ORDER — PROPOFOL 10 MG/ML
INJECTION, EMULSION INTRAVENOUS PRN
Status: DISCONTINUED | OUTPATIENT
Start: 2021-10-25 | End: 2021-10-25

## 2021-10-25 RX ORDER — ONDANSETRON 4 MG/1
4 TABLET, ORALLY DISINTEGRATING ORAL
Status: DISCONTINUED | OUTPATIENT
Start: 2021-10-25 | End: 2021-10-25 | Stop reason: HOSPADM

## 2021-10-25 RX ORDER — LIDOCAINE 40 MG/G
CREAM TOPICAL
Status: DISCONTINUED | OUTPATIENT
Start: 2021-10-25 | End: 2021-10-25 | Stop reason: HOSPADM

## 2021-10-25 RX ORDER — KETOROLAC TROMETHAMINE 30 MG/ML
INJECTION, SOLUTION INTRAMUSCULAR; INTRAVENOUS PRN
Status: DISCONTINUED | OUTPATIENT
Start: 2021-10-25 | End: 2021-10-25

## 2021-10-25 RX ORDER — ONDANSETRON 2 MG/ML
4 INJECTION INTRAMUSCULAR; INTRAVENOUS EVERY 30 MIN PRN
Status: DISCONTINUED | OUTPATIENT
Start: 2021-10-25 | End: 2021-10-25 | Stop reason: HOSPADM

## 2021-10-25 RX ORDER — POLYETHYLENE GLYCOL 3350 17 G/17G
1 POWDER, FOR SOLUTION ORAL DAILY PRN
Qty: 500 G | Refills: 0 | COMMUNITY
Start: 2021-10-25

## 2021-10-25 RX ORDER — FENTANYL CITRATE 50 UG/ML
50 INJECTION, SOLUTION INTRAMUSCULAR; INTRAVENOUS EVERY 5 MIN PRN
Status: DISCONTINUED | OUTPATIENT
Start: 2021-10-25 | End: 2021-10-25 | Stop reason: HOSPADM

## 2021-10-25 RX ORDER — FENTANYL CITRATE 50 UG/ML
INJECTION, SOLUTION INTRAMUSCULAR; INTRAVENOUS PRN
Status: DISCONTINUED | OUTPATIENT
Start: 2021-10-25 | End: 2021-10-25

## 2021-10-25 RX ORDER — BUPIVACAINE HYDROCHLORIDE AND EPINEPHRINE 2.5; 5 MG/ML; UG/ML
INJECTION, SOLUTION INFILTRATION; PERINEURAL PRN
Status: DISCONTINUED | OUTPATIENT
Start: 2021-10-25 | End: 2021-10-25 | Stop reason: HOSPADM

## 2021-10-25 RX ORDER — ONDANSETRON 2 MG/ML
INJECTION INTRAMUSCULAR; INTRAVENOUS PRN
Status: DISCONTINUED | OUTPATIENT
Start: 2021-10-25 | End: 2021-10-25

## 2021-10-25 RX ORDER — PROPOFOL 10 MG/ML
INJECTION, EMULSION INTRAVENOUS CONTINUOUS PRN
Status: DISCONTINUED | OUTPATIENT
Start: 2021-10-25 | End: 2021-10-25

## 2021-10-25 RX ORDER — OXYCODONE HYDROCHLORIDE 5 MG/1
5 TABLET ORAL EVERY 4 HOURS PRN
Status: DISCONTINUED | OUTPATIENT
Start: 2021-10-25 | End: 2021-10-25 | Stop reason: HOSPADM

## 2021-10-25 RX ADMIN — PROPOFOL 250 MG: 10 INJECTION, EMULSION INTRAVENOUS at 11:33

## 2021-10-25 RX ADMIN — DEXAMETHASONE SODIUM PHOSPHATE 4 MG: 10 INJECTION, SOLUTION INTRAMUSCULAR; INTRAVENOUS at 11:48

## 2021-10-25 RX ADMIN — LIDOCAINE HYDROCHLORIDE 1 ML: 10 INJECTION, SOLUTION EPIDURAL; INFILTRATION; INTRACAUDAL; PERINEURAL at 10:33

## 2021-10-25 RX ADMIN — LIDOCAINE HYDROCHLORIDE 80 MG: 20 INJECTION, SOLUTION INFILTRATION; PERINEURAL at 11:33

## 2021-10-25 RX ADMIN — SODIUM CHLORIDE, POTASSIUM CHLORIDE, SODIUM LACTATE AND CALCIUM CHLORIDE: 600; 310; 30; 20 INJECTION, SOLUTION INTRAVENOUS at 10:33

## 2021-10-25 RX ADMIN — FENTANYL CITRATE 50 MCG: 50 INJECTION, SOLUTION INTRAMUSCULAR; INTRAVENOUS at 11:33

## 2021-10-25 RX ADMIN — PROPOFOL 100 MCG/KG/MIN: 10 INJECTION, EMULSION INTRAVENOUS at 11:40

## 2021-10-25 RX ADMIN — FENTANYL CITRATE 50 MCG: 50 INJECTION, SOLUTION INTRAMUSCULAR; INTRAVENOUS at 12:03

## 2021-10-25 RX ADMIN — ROCURONIUM BROMIDE 50 MG: 50 INJECTION, SOLUTION INTRAVENOUS at 11:33

## 2021-10-25 RX ADMIN — MIDAZOLAM 2 MG: 1 INJECTION INTRAMUSCULAR; INTRAVENOUS at 11:27

## 2021-10-25 RX ADMIN — KETOROLAC TROMETHAMINE 30 MG: 30 INJECTION, SOLUTION INTRAMUSCULAR at 12:03

## 2021-10-25 RX ADMIN — ONDANSETRON 4 MG: 2 INJECTION INTRAMUSCULAR; INTRAVENOUS at 11:48

## 2021-10-25 RX ADMIN — SUGAMMADEX 200 MG: 100 INJECTION, SOLUTION INTRAVENOUS at 12:03

## 2021-10-25 NOTE — OP NOTE
Procedure Date: 10/25/2021    PROCEDURE:  Excision of external hemorrhoid and residual skin tag.    PREOPERATIVE DIAGNOSIS:  External hemorrhoid with residual skin tag.    POSTOPERATIVE DIAGNOSIS:  External hemorrhoid with residual skin tag.    SURGEON:  Mateo Warren DO    ASSISTANT:  None.    ANESTHESIA:  General endotracheal anesthesia.    COMPLICATIONS:  None immediately apparent.    SPECIMENS:  External hemorrhoidal tissue.    ESTIMATED BLOOD LOSS:  10 mL.    COMPLICATIONS:  None immediately apparent.    INDICATIONS FOR PROCEDURE:  Sumaya is a 51-year-old female whom I met in surgical clinic with complaints of difficulties with perianal hygiene following bowel movements.  She has a history of hemorrhoids that she has had since her most recent pregnancy, which was many years ago at this point.  She states it is difficult to get the area clean after a bowel movement due to the excess residual external hemorrhoid skin tissue.  Exam corroborated her history and did show a small anterior residual skin tag.  Due to the sensitive are, I recommended intraoperative excision with general anesthesia of the residual tissue.  I described the procedure in detail, as well as the risks, benefits, alternatives, and postoperative care and restrictions.  After our informed discussion, she agreed to proceed with surgery.    DESCRIPTION OF PROCEDURE:  After informed consent was obtained, the patient was brought to the preoperative holding area to the operating room.  Anesthesia was induced.  She was then flipped into the prone position.  She was prepped and draped in normal sterile fashion.  Timeout was performed.  After correct patient and correct procedure were verified, we began by marking out an area of excision on the anterior residual external hemorrhoid skin tissue.  Using a 15 blade scalpel, this excess skin was excised.  There was only a minimal amount of bleeding.  This was controlled with direct pressure.  We began  by closing the incision using a 3-0 Vicryl suture, first with a locking, running suture and then when we got to the external component of the anal skin, we started doing a running stitch.  Once this was completely closed, a small amount of pressure was applied to achieve hemostasis.  A digital rectal exam was performed.  No masses or bleeding internally.  First, we instilled 0.25% Marcaine with epinephrine for local anesthesia in the skin and subcutaneous tissue surrounding the anus.  Appropriate dressing was applied.  The patient was then awoken from anesthesia and brought to the recovery room in stable condition.  At the completion of the case, all instruments, needles and sponges were accounted for, after a correct count.  The patient was then awoken from anesthesia and brought to the recovery room in stable condition.    Mateo Warren DO        D: 10/25/2021   T: 10/25/2021   MT: HARRY    Name:     GEORGES WHITE  MRN:      [id number]        Account:        [id number]   :      1970           Procedure Date: 10/25/2021     Document: [id number]

## 2021-10-25 NOTE — DISCHARGE INSTRUCTIONS
Home Care Following Outpatient Rectal Surgery    Dr. Warren    Care of the Wound:    Ice packs covered in cloth may be applied to the rectal area for 15 minutes at a time, up to 4 times a day  Activity:    NO strenuous exercise or lifting more than 20 pounds for two weeks.    Climbing stairs, walking ,car riding and driving may be done in moderation    Diet:    Return to the diet you were on before surgery.    Drink plenty of water and daily stool softener while on pain medications.    You should take a fiber supplement daily.    The surgeon has ordered that you use Mirilax daily until you have your first bowel movement (stool).  Things to Expect:    During the first and second day you may experience less pain than in the next few days because of a long-acting medicine that the physician injected at the time of surgery    Occasionally after rectal surgery it may be difficult to empty your bladder.  A warm bath may help you to relax enough to urinate    Some bloody discharge, especially after bowel movements for 1-2 weeks is common.  Use a thin sanitary pad to absorb any drainage    Sitting may be uncomfortable.  Using a soft pillow may be helpful    Bowel movements will be uncomfortable at first.  This will lessen as healing progresses    You should have a bowel movement at least every other day.  If 2 days have passed without a bowel movement, take 2 Dulcolax tablets OR Miralax OR 1-2 Tablespoons of mineral oil.  Call Your Physician if You Have:    Rectal swelling and redness    Increased bleeding or foul-smelling drainage    Temperature greater than 101 degrees F    Any questions or concerns about your recovery, please call Business hours [phone]     After hours [phone] Nurse Advice Line (24 hours a day)    Follow-up Care:    Make an appointment 2-3 week after your surgery if not already made for you.  Call [phone].    [facility]Day [facility]   Adult Discharge Orders &  Instructions     For 24 hours after surgery    1. Get plenty of rest.  A responsible adult must stay with you for at least 24 hours after you leave the hospital.   2. Do not drive or use heavy equipment.  If you have weakness or tingling, don't drive or use heavy equipment until this feeling goes away.  3. Do not drink alcohol.  4. Avoid strenuous or risky activities.  Ask for help when climbing stairs.   5. You may feel lightheaded.  If so, sit for a few minutes before standing.  Have someone help you get up.   6. You may have a slight fever. Call the doctor if your fever is over 100 F (37.7 C) (taken under the tongue) or lasts longer than 24 hours.  7. You may have a dry mouth, a sore throat, muscle aches or trouble sleeping.  These should go away after 24 hours.  8. Do not make important or legal decisions.  We don t expect you to have any problems from the surgery or treatment you had today. Just in case, here s what to do if you have pain, upset stomach (nausea), bleeding or infection:  Pain:  Take medicines your doctor has prescribed or over-the-counter medicine they have suggested. Resting and using ice packs can help, too. For surgery on an arm or leg, raise it on a pillow to ease swelling. Call your doctor if these methods don t work.  Copyright Jossue Curry, Licensed under [employer]  Upset stomach (nausea):  Take anti-nausea medicine approved by your doctor. Drink clear liquids like apple juice, ginger ale, broth or 7-Up. Be sure to drink enough fluids. Rest can help, too. Move to normal foods when you re ready.   Bleeding:  In the first 24 hours, you may see a little blood on your dressing, about the size of a quarter. You don t need to worry about this much blood, but if the blood spot keeps getting bigger:    Put pressure on the wound if you can, AND    Call your doctor.  Copyright [employer], Licensed under [employer]  Fever/Infection: Please call your doctor if you have any of  these signs:    Redness    Swelling    Wound feels warm    Pain gets worse    Bad-smelling fluid leaks from wound    Fever or chills    24 Hour Nurse Advice line: [phone]

## 2021-10-25 NOTE — ANESTHESIA PROCEDURE NOTES
Airway       Patient location during procedure: OR       Procedure Start/Stop Times: 10/25/2021 11:36 AM  Staff -        CRNA: Rose Segura APRN CRNA       Performed By: CRNA  Consent for Airway        Urgency: elective  Indications and Patient Condition       Indications for airway management: anson-procedural       Induction type:intravenous       Mask difficulty assessment: 1 - vent by mask    Final Airway Details       Final airway type: endotracheal airway       Successful airway: ETT - single  Endotracheal Airway Details        ETT size (mm): 6.5       Cuffed: yes       Cuff volume (mL): 8       Successful intubation technique: direct laryngoscopy       Grade View of Cords: 1       Adjucts: stylet       Position: Right       Measured from: lips       Secured at (cm): 22       Bite block used: None    Post intubation assessment        Placement verified by: capnometry, equal breath sounds and chest rise        Number of attempts at approach: 1       Number of other approaches attempted: 0       Secured with: silk tape       Ease of procedure: easy       Dentition: Intact and Unchanged    Additional Comments       Atraumatic. No apparent complications.

## 2021-10-25 NOTE — ANESTHESIA PREPROCEDURE EVALUATION
Anesthesia Pre-Procedure Evaluation    Patient: Sumaya Gatica   MRN: [id number] : 1970        Preoperative Diagnosis: External hemorrhoids [K64.4]    Procedure : Procedure(s):  HEMORRHOIDECTOMY, EXTERNAL          Past Medical History:   Diagnosis Date     Abnormal Pap smear, can't excl hi gd sq intraepithelial lesion (ASC-H) 3/22/07    negative colposcopy     History of colposcopy with cervical biopsy 07     Ovarian cysts      Rosacea       Past Surgical History:   Procedure Laterality Date     COLONOSCOPY  2012    Procedure: COLONOSCOPY;  Colonscopy Screening, Diverticulitis;  Surgeon: Reilly Holt MD;  Location:  GI     DILATION AND CURETTAGE, HYSTEROSCOPY, ABLATE ENDOMETRIUM NOVASURE, COMBINED  2011    Procedure:COMBINED DILATION AND CURETTAGE, HYSTEROSCOPY, ABLATE ENDOMETRIUM NOVASURE; hysteroscopy, dialtion and curettage, novasure endometrial ablation  ; Surgeon:MILAD VILLARREAL; Location: OR     GYN SURGERY      Hyst      HC REMOVAL OF TONSILS,<11 Y/O       HC REPAIR OF NASAL SEPTUM  08    Septoplasty, submucosal resection inferior turbinates.     HYSTERECTOMY, PAP NO LONGER INDICATED        Allergies   Allergen Reactions     No Known Drug Allergies      Peanuts [Nuts] Hives     Seasonal Allergies      Shellfish-Derived Products Itching      Social History     Tobacco Use     Smoking status: Never Smoker     Smokeless tobacco: Never Used   Substance Use Topics     Alcohol use: Yes     Alcohol/week: 0.0 standard drinks     Comment: occ.      Wt Readings from Last 1 Encounters:   10/22/21 81.4 kg (179 lb 8 oz)        Anesthesia Evaluation   Pt has had prior anesthetic. Type: MAC and General.        ROS/MED HX  ENT/Pulmonary:     (+) ADAM risk factors, snores loudly, allergic rhinitis,     Neurologic:     (+) migraines,     Cardiovascular:  - neg cardiovascular ROS     METS/Exercise Tolerance: >4 METS    Hematologic:  - neg hematologic  ROS     Musculoskeletal:  - neg  musculoskeletal ROS     GI/Hepatic:  - neg GI/hepatic ROS     Renal/Genitourinary:  - neg Renal ROS     Endo:  - neg endo ROS     Psychiatric/Substance Use:     (+) psychiatric history anxiety     Infectious Disease:  - neg infectious disease ROS     Malignancy:  - neg malignancy ROS     Other:  - neg other ROS          Physical Exam    Airway  airway exam normal      Mallampati: II   TM distance: > 3 FB   Neck ROM: full   Mouth opening: > 3 cm    Respiratory Devices and Support         Dental  no notable dental history         Cardiovascular   cardiovascular exam normal       Rhythm and rate: regular and normal     Pulmonary   pulmonary exam normal        breath sounds clear to auscultation           OUTSIDE LABS:  CBC:   Lab Results   Component Value Date    WBC 6.4 06/21/2021    WBC 8.4 03/11/2019    HGB 13.9 06/21/2021    HGB 13.6 03/11/2019    HCT 42.0 06/21/2021    HCT 39.6 03/11/2019     06/21/2021     03/11/2019     BMP:   Lab Results   Component Value Date     06/21/2021     03/11/2019    POTASSIUM 4.0 06/21/2021    POTASSIUM 4.4 03/11/2019    CHLORIDE 106 06/21/2021    CHLORIDE 102 03/11/2019    CO2 29 06/21/2021    CO2 28 03/11/2019    BUN 12 06/21/2021    BUN 13 03/11/2019    CR 0.83 06/21/2021    CR 0.73 03/11/2019    GLC 85 06/21/2021    GLC 80 03/11/2019     COAGS: No results found for: PTT, INR, FIBR  POC:   Lab Results   Component Value Date    HCG Negative 12/30/2011     HEPATIC:   Lab Results   Component Value Date    ALBUMIN 3.9 06/21/2021    PROTTOTAL 7.5 06/21/2021    ALT 21 06/21/2021    AST 16 06/21/2021    ALKPHOS 69 06/21/2021    BILITOTAL 0.5 06/21/2021     OTHER:   Lab Results   Component Value Date    PH 6.0 06/24/2002    A1C 5.6 06/11/2004    EDIS 8.8 06/21/2021    LIPASE 143 03/11/2019    AMYLASE 47 03/11/2019    TSH 1.06 07/05/2016    T4 0.83 12/17/2008    SED 4 06/24/2002       Anesthesia Plan    ASA Status:  2   NPO Status:  NPO Appropriate    Anesthesia  Type: General.     - Airway: ETT   Induction: Intravenous.   Maintenance: Balanced.        Consents    Anesthesia Plan(s) and associated risks, benefits, and realistic alternatives discussed. Questions answered and patient/representative(s) expressed understanding.     - Discussed with:  Patient      - Extended Intubation/Ventilatory Support Discussed: No.      - Patient is DNR/DNI Status: No    Use of blood products discussed: Yes.     - Discussed with: Patient.     - Consented: consented to blood products            Reason for refusal: other.     Postoperative Care    Pain management: IV analgesics, Multi-modal analgesia.   PONV prophylaxis: Ondansetron (or other 5HT-3), Dexamethasone or Solumedrol, Background Propofol Infusion     Comments:    The risks and benefits of anesthesia, and the alternatives where applicable, have been discussed with the patient, and they wish to proceed.              LEA Salinas CRNA

## 2021-10-25 NOTE — BRIEF OP NOTE
[facility] Brief Operative Note    Pre-operative diagnosis: External hemorrhoids [K64.4]   Post-operative diagnosis external hemorrhoid, skin tag     Procedure: Procedure(s):  HEMORRHOIDECTOMY, EXTERNAL   Surgeon(s): Surgeon(s) and Role:     * Mateo Warren DO - Primary   Estimated blood loss: 10ml    Specimens: ID Type Source Tests Collected by Time Destination   1 : hemorrhoid Tissue Hemorrhoids SURGICAL PATHOLOGY EXAM Mateo Warren DO 10/25/2021 11:48 AM       Findings: Small anterior residual external hemorrhoidal skin tissue/skin tag.

## 2021-10-25 NOTE — ANESTHESIA CARE TRANSFER NOTE
Patient: Sumaya Gatica    Procedure: Procedure(s):  HEMORRHOIDECTOMY, EXTERNAL       Diagnosis: External hemorrhoids [K64.4]  Diagnosis Additional Information: No value filed.    Anesthesia Type:   General     Note:    Oropharynx: oropharynx clear of all foreign objects and spontaneously breathing  Level of Consciousness: drowsy  Oxygen Supplementation: face mask    Independent Airway: airway patency satisfactory and stable  Dentition: dentition unchanged  Vital Signs Stable: post-procedure vital signs reviewed and stable  Report to RN Given: handoff report given  Patient transferred to: PACU    Handoff Report: Identifed the Patient, Identified the Reponsible Provider, Reviewed the pertinent medical history, Discussed the surgical course, Reviewed Intra-OP anesthesia mangement and issues during anesthesia, Set expectations for post-procedure period and Allowed opportunity for questions and acknowledgement of understanding      Vitals:  Vitals Value Taken Time   BP     Temp     Pulse     Resp     SpO2 99 % 10/25/21 1213   Vitals shown include unvalidated device data.    Electronically Signed By: LEA Salinas CRNA  October 25, 2021  12:14 PM

## 2021-10-25 NOTE — ANESTHESIA POSTPROCEDURE EVALUATION
Patient: Sumaya Gatica    Procedure: Procedure(s):  HEMORRHOIDECTOMY, EXTERNAL       Diagnosis:External hemorrhoids [K64.4]  Diagnosis Additional Information: No value filed.    Anesthesia Type:  General    Note:  Disposition: Outpatient   Postop Pain Control: Uneventful            Sign Out: Well controlled pain   PONV: No   Neuro/Psych: Uneventful            Sign Out: Acceptable/Baseline neuro status   Airway/Respiratory: Uneventful            Sign Out: Acceptable/Baseline resp. status   CV/Hemodynamics: Uneventful            Sign Out: Acceptable CV status   Other NRE: NONE   DID A NON-ROUTINE EVENT OCCUR? No    Event details/Postop Comments:  Pt was happy with anesthesia care.  No complications.  I will follow up with the pt if needed.           Last vitals:  Vitals Value Taken Time   /82 10/25/21 1240   Temp 97.34  F (36.3  C) 10/25/21 1247   Pulse 81 10/25/21 1247   Resp 10 10/25/21 1247   SpO2 100 % 10/25/21 1247   Vitals shown include unvalidated device data.    Electronically Signed By: LEA Salinas CRNA  October 25, 2021  12:52 PM

## 2021-10-26 ENCOUNTER — ALLIED HEALTH/NURSE VISIT (OUTPATIENT)
Dept: ALLERGY | Facility: OTHER | Age: 51
End: 2021-10-26
Payer: COMMERCIAL

## 2021-10-26 DIAGNOSIS — J30.89 ALLERGIC RHINITIS DUE TO DUST MITE: ICD-10-CM

## 2021-10-26 DIAGNOSIS — J30.81 ALLERGIC RHINITIS DUE TO ANIMAL DANDER: ICD-10-CM

## 2021-10-26 DIAGNOSIS — J30.1 CHRONIC SEASONAL ALLERGIC RHINITIS DUE TO POLLEN: Primary | ICD-10-CM

## 2021-10-26 DIAGNOSIS — J30.89 ALLERGIC RHINITIS DUE TO MOLD: ICD-10-CM

## 2021-10-26 PROCEDURE — 95117 IMMUNOTHERAPY INJECTIONS: CPT

## 2021-10-26 NOTE — PROGRESS NOTES
Patient presented after waiting 30 minutes with no reaction to allergy injections. Discharged from clinic.    Estrada BARR RN ............   10/26/2021...11:36 AM

## 2021-10-28 LAB
PATH REPORT.COMMENTS IMP SPEC: NORMAL
PATH REPORT.COMMENTS IMP SPEC: NORMAL
PATH REPORT.FINAL DX SPEC: NORMAL
PATH REPORT.GROSS SPEC: NORMAL
PATH REPORT.MICROSCOPIC SPEC OTHER STN: NORMAL
PATH REPORT.RELEVANT HX SPEC: NORMAL
PHOTO IMAGE: NORMAL

## 2021-10-28 PROCEDURE — 88304 TISSUE EXAM BY PATHOLOGIST: CPT | Mod: 26 | Performed by: PATHOLOGY

## 2021-11-08 ENCOUNTER — TELEPHONE (OUTPATIENT)
Dept: SURGERY | Facility: CLINIC | Age: 51
End: 2021-11-08
Payer: COMMERCIAL

## 2021-11-08 NOTE — TELEPHONE ENCOUNTER
Writer spoke with patient regarding below, patient has moved to help parents and can only be seen 11/22 or 11/23 when she is in town, she is having some discomfort from her hemorrhoidectomy that was done on 10/25. Dr. Warren offered to call patient or stated we can schedule with another provider. Patient stated she will see another provider and she appreciated Dr. Warren offering that and she thinks he is a wonderful surgeon. Patient scheduled with Dr. Hernandez 11/23.    Nicole Tran RN on 11/8/2021 at 1:44 PM

## 2021-11-08 NOTE — TELEPHONE ENCOUNTER
Reason for Call:  Other appointment    Detailed comments: patient is supposed to be setting up a post op with Dr. Warren but [place] ended [place] go [place]. So she is not able to come in until the week of 11/22 but Dr. Warren is out that week and is not back until 12/01. Is she able to see another general surgeon or what should she do for her post op. Please call her    Phone Number Patient can be reached at: Home number on file [phone] (home)    Best Time: any    Can we leave a detailed message on this number? YES    Call taken on 11/8/2021 at 11:34 AM by Yaa Parkinson

## 2021-11-13 DIAGNOSIS — J30.1 CHRONIC SEASONAL ALLERGIC RHINITIS DUE TO POLLEN: ICD-10-CM

## 2021-11-15 NOTE — TELEPHONE ENCOUNTER
Pending Prescriptions:                       Disp   Refills    montelukast (SINGULAIR) 10 MG tablet      30 tab*0            Sig: Take 1 tablet (10 mg) by mouth At Bedtime Patient           needs to be seen in clinic.    Routing refill request to provider for review/approval because:  Xenia given x1 and patient did not follow up, please advise    Pallavi Glaser RN

## 2021-11-15 NOTE — TELEPHONE ENCOUNTER
Declined. She either needs a follow-up appointment or can request further refills from PCP.    Jim Martinez MD

## 2021-11-16 RX ORDER — MONTELUKAST SODIUM 10 MG/1
10 TABLET ORAL AT BEDTIME
Qty: 30 TABLET | Refills: 0 | OUTPATIENT
Start: 2021-11-16

## 2021-11-16 NOTE — TELEPHONE ENCOUNTER
Refused Prescriptions:                       Disp   Refills    montelukast (SINGULAIR) 10 MG tablet       30 tab*0        Sig: Take 1 tablet (10 mg) by mouth At Bedtime Patient           needs to be seen in clinic.  Refused By: ÁLVARO GLASER  Reason for Refusal: Patient needs appointment    Álvaro Glaser RN

## 2021-11-30 ENCOUNTER — ALLIED HEALTH/NURSE VISIT (OUTPATIENT)
Dept: ALLERGY | Facility: OTHER | Age: 51
End: 2021-11-30
Payer: COMMERCIAL

## 2021-11-30 DIAGNOSIS — J30.89 ALLERGIC RHINITIS DUE TO DUST MITE: ICD-10-CM

## 2021-11-30 DIAGNOSIS — J30.81 ALLERGIC RHINITIS DUE TO ANIMAL DANDER: ICD-10-CM

## 2021-11-30 DIAGNOSIS — J30.89 ALLERGIC RHINITIS DUE TO MOLD: ICD-10-CM

## 2021-11-30 DIAGNOSIS — J30.1 SEASONAL ALLERGIC RHINITIS DUE TO POLLEN: Primary | ICD-10-CM

## 2021-11-30 PROCEDURE — 95117 IMMUNOTHERAPY INJECTIONS: CPT

## 2021-11-30 NOTE — PROGRESS NOTES
Patient presented after waiting 30 minutes with no reaction to allergy injections. Discharged from clinic.    Estrada ORELLANA RN ............   11/30/2021...1:42 PM

## 2021-12-01 ENCOUNTER — OFFICE VISIT (OUTPATIENT)
Dept: SURGERY | Facility: CLINIC | Age: 51
End: 2021-12-01
Payer: COMMERCIAL

## 2021-12-01 VITALS
SYSTOLIC BLOOD PRESSURE: 124 MMHG | HEIGHT: 67 IN | TEMPERATURE: 98.8 F | WEIGHT: 183 LBS | BODY MASS INDEX: 28.72 KG/M2 | DIASTOLIC BLOOD PRESSURE: 70 MMHG

## 2021-12-01 DIAGNOSIS — Z98.890 S/P HEMORRHOIDECTOMY: Primary | ICD-10-CM

## 2021-12-01 DIAGNOSIS — Z87.19 S/P HEMORRHOIDECTOMY: Primary | ICD-10-CM

## 2021-12-01 PROCEDURE — 99024 POSTOP FOLLOW-UP VISIT: CPT | Performed by: SURGERY

## 2021-12-01 ASSESSMENT — MIFFLIN-ST. JEOR: SCORE: 1482.79

## 2021-12-01 NOTE — LETTER
"    12/1/2021         RE: Sumaya Gatica  [address]  [place] [address]        Dear Colleague,    Thank you for referring your patient, Sumaya Gatica, to the [facility]. Please see a copy of my visit note below.    General Surgery Follow Up    Pt returns for follow up visit s/p external hemorrhoidectomy    HPI:  Doing well. No bleeding, pain or signs of infection. She states that she still feels like its \"lumpy\" in that area and is wiping a lot still.       Past Medical History:   Diagnosis Date     Abnormal Pap smear, can't excl hi gd sq intraepithelial lesion (ASC-H) 3/22/07    negative colposcopy     History of colposcopy with cervical biopsy 4/23/07     Ovarian cysts      Rosacea        Past Surgical History:   Procedure Laterality Date     COLONOSCOPY  6/22/2012    Procedure: COLONOSCOPY;  Colonscopy Screening, Diverticulitis;  Surgeon: Reilly Holt MD;  Location:  GI     DILATION AND CURETTAGE, HYSTEROSCOPY, ABLATE ENDOMETRIUM NOVASURE, COMBINED  12/30/2011    Procedure:COMBINED DILATION AND CURETTAGE, HYSTEROSCOPY, ABLATE ENDOMETRIUM NOVASURE; hysteroscopy, dialtion and curettage, novasure endometrial ablation  ; Surgeon:MILAD VILLARREAL; Location: OR     GYN SURGERY  2015    Hyst      HC REMOVAL OF TONSILS,<13 Y/O       HC REPAIR OF NASAL SEPTUM  12/30/08    Septoplasty, submucosal resection inferior turbinates.     HEMORRHOIDECTOMY EXTERNAL N/A 10/25/2021    Procedure: HEMORRHOIDECTOMY, EXTERNAL;  Surgeon: Mateo Warren DO;  Location: [facility] OR     HYSTERECTOMY, PAP NO LONGER INDICATED         Social History     Socioeconomic History     Marital status: Legally      Spouse name: Sridhar     Number of children: 3     Years of education: Not on file     Highest education level: Not on file   Occupational History     Employer: SELF EMPLOYED     Employer: SELF   Tobacco Use     Smoking status: Never Smoker     Smokeless tobacco: Never " Used   Vaping Use     Vaping Use: Never used   Substance and Sexual Activity     Alcohol use: Yes     Alcohol/week: 0.0 standard drinks     Comment: occ.     Drug use: No     Sexual activity: Yes     Partners: Male     Birth control/protection: Male Surgical, Female Surgical     Comment: ablasion and hysterectomy   Other Topics Concern      Service Not Asked     Blood Transfusions Not Asked     Caffeine Concern No     Occupational Exposure Not Asked     Hobby Hazards Not Asked     Sleep Concern No     Stress Concern Yes     Weight Concern Yes     Special Diet Not Asked     Back Care Not Asked     Exercise Yes     Bike Helmet Not Asked     Seat Belt Yes     Self-Exams Not Asked     Parent/sibling w/ CABG, MI or angioplasty before 65F 55M? No   Social History Narrative     Not on file     Social Determinants of Health     Financial Resource Strain: Not on file   Food Insecurity: Not on file   Transportation Needs: Not on file   Physical Activity: Not on file   Stress: Not on file   Social Connections: Not on file   Intimate Partner Violence: Not on file   Housing Stability: Not on file       Current Outpatient Medications   Medication Sig Dispense Refill     calcium carbonate (OS-EDIS 500 MG [place]. [place]) 1250 MG tablet Take 1 tablet by mouth daily       EPINEPHrine (EPIPEN 2-ROMARIO) 0.3 MG/0.3ML injection 2-pack Inject 0.3 mLs (0.3 mg) into the muscle as needed for anaphylaxis 0.6 mL 1     Lactobacillus (PROBIOTIC ACIDOPHILUS PO)        metroNIDAZOLE (METROCREAM) 0.75 % external cream Apply topically 2 times daily 45 g 7     montelukast (SINGULAIR) 10 MG tablet Take 1 tablet (10 mg) by mouth At Bedtime Patient needs to be seen in clinic. 30 tablet 0     Multiple Vitamin (MULTIVITAMIN ADULT PO)        Naltrexone HCl POWD        olopatadine (PATANOL) 0.1 % ophthalmic solution Place 1 drop into both eyes 2 times daily 1 Bottle 4     ORDER FOR ALLERGEN IMMUNOTHERAPY Abdiaziz, White 1:20 w/v, HS  0.5 ml  Birch Mix PRW 1:20  w/v, HS  0.5 ml  Boxelder-Maple Mix BHR (Boxelder Hard Red) 1:20 w/v, HS  0.5 ml  Cedar, Red 1:20 w/v, HS  0.5 ml  Tow, Common 1:20 w/v, HS  0.5 ml  Elm, American 1:20 w/v, HS  0.5 ml  Meredith Mix RW 1:20 w/v, HS 0.5 ml  Oak Mix RVW 1:20 w/v, HS 0.5 ml  Pine, White 1:20 w/v, ALK 0.5 ml  Meeker Tree, Black 1:20 w/v, HS 0.5 ml  Diluent: HSA qs to 5ml 5 mL prn     ORDER FOR ALLERGEN IMMUNOTHERAPY Dog Hair-Dander, A. P.  1:100 w/v, HS  1.0 ml  Dust Mites DF. 10,000 AU/mL, HS  0.5 ml  Dust Mites DP. 10,000 AU/mL, HS  0.5 ml   Richard Grass 1:20 w/v, HS 0.5 ml  Bonilla Grass (Std) 100,000 BAU/mL, HS 0.4 ml  Diluent: HSA qs to 5ml 5 mL prn     ORDER FOR ALLERGEN IMMUNOTHERAPY Kochia 1:20 w/v, HS 0.5 ml  Lamb's Quarters 1:20 w/v, HS 0.5 ml  Nettle 1:20 w/v, HS 0.5 ml  Plantain, English 1:20 w/v, HS 0.5 ml  Ragweed, Mix (Giant, Short) 1:20 w/v, HS 0.5 ml  Russian Thistle 1:20 w/v, HS 0.5 ml  Sagebrush, Mugwort 1:20 w/v, HS 0.5 ml  Sorrel, Sheep 1:20 w/v, HS 0.5 ml  Diluent: HSA qs to 5ml 5 mL prn     ORDER FOR ALLERGEN IMMUNOTHERAPY Cat Hair, Standardized A.P. 10,000 BAU/mL, HS  2.0 ml   Alternaria Tenuis 1:10 w/v, HS  0.5 ml  Aspergillus Fumigatus 1:10 w/v, HS  0.5 ml  Epicoccum Nigrum 1:10 w/v, HS 0.5 ml  Penicillium Notatum 1:10 w/v, HS  0.5 ml  Diluent: HSA qs to 5ml 5 mL prn     oxyCODONE (ROXICODONE) 5 MG tablet Take 1-2 tablets (5-10 mg) by mouth every 3 hours as needed for severe pain 12 tablet 0     polyethylene glycol (MIRALAX) 17 GM/Dose powder Take 17 g (1 capful) by mouth daily as needed for constipation (If no bowel movement in 24 hours. Mix in 8 oz of water.  May take twice daily.) 500 g 0     POTASSIUM PO        progesterone POWD powder        SUMAtriptan (IMITREX) 50 MG tablet Take 1 tablet (50 mg) by mouth See Admin Instructions For ONSET OF MIGRAINE, MAY REPEAT ONCE AFTER 2 HRS. DO NOT EXCEED 4 TABLETS IN 24 HRS. (Patient not taking: Reported on 10/22/2021) 12 tablet prn     triamcinolone  "(NASACORT) 55 MCG/ACT nasal aerosol Spray 2 sprays into both nostrils daily 1 Bottle 11     UNABLE TO FIND Apply topically daily MEDICATION NAME: testosterone cream       VITAMIN D, CHOLECALCIFEROL, PO Take 10,000 Units by mouth daily         Medications and history reviewed    Physical exam:  Vitals: /70   Temp 98.8  F (37.1  C) (Temporal)   Ht 1.71 m (5' 7.32\")   Wt 83 kg (183 lb)   LMP 06/15/2015 (Approximate)   BMI 28.39 kg/m    BMI= Body mass index is 28.39 kg/m .    HEART: RRR, no new murmurs  LUNGS: CTAB, equal chest rise, good effort  ABD: soft, non tender, non distended  Rectal: Externally no erythema, bleeding or drainage, no open portions of incision, everything has completely healed, no residual skin tissue there to explain her concerns  EXT: GIL, no deformities    PATHOLOGY:  External hemorrhoids, hemorrhoidectomy:  - Benign squamous lined fibrous stroma with focal mildly dilated blood filled vascular channels.    Assessment:     ICD-10-CM    1. S/P hemorrhoidectomy  Z98.890     Z87.19      Plan: Reassured her that everything seems to be healing very well. She understands. Return prn.    Mateo Warren, DO        Again, thank you for allowing me to participate in the care of your patient.        Sincerely,        Mateo Warren, DO    "

## 2021-12-01 NOTE — PROGRESS NOTES
"General Surgery Follow Up    Pt returns for follow up visit s/p external hemorrhoidectomy    HPI:  Doing well. No bleeding, pain or signs of infection. She states that she still feels like its \"lumpy\" in that area and is wiping a lot still.       Past Medical History:   Diagnosis Date     Abnormal Pap smear, can't excl hi gd sq intraepithelial lesion (ASC-H) 3/22/07    negative colposcopy     History of colposcopy with cervical biopsy 4/23/07     Ovarian cysts      Rosacea        Past Surgical History:   Procedure Laterality Date     COLONOSCOPY  6/22/2012    Procedure: COLONOSCOPY;  Colonscopy Screening, Diverticulitis;  Surgeon: Reilly Holt MD;  Location:  GI     DILATION AND CURETTAGE, HYSTEROSCOPY, ABLATE ENDOMETRIUM NOVASURE, COMBINED  12/30/2011    Procedure:COMBINED DILATION AND CURETTAGE, HYSTEROSCOPY, ABLATE ENDOMETRIUM NOVASURE; hysteroscopy, dialtion and curettage, novasure endometrial ablation  ; Surgeon:MILAD VILLARREAL; Location: OR     GYN SURGERY  2015    Hyst      HC REMOVAL OF TONSILS,<11 Y/O       HC REPAIR OF NASAL SEPTUM  12/30/08    Septoplasty, submucosal resection inferior turbinates.     HEMORRHOIDECTOMY EXTERNAL N/A 10/25/2021    Procedure: HEMORRHOIDECTOMY, EXTERNAL;  Surgeon: Mateo Warren DO;  Location: [facility]     HYSTERECTOMY, PAP NO LONGER INDICATED         Social History     Socioeconomic History     Marital status: Legally      Spouse name: Sridhar     Number of children: 3     Years of education: Not on file     Highest education level: Not on file   Occupational History     Employer: SELF EMPLOYED     Employer: SELF   Tobacco Use     Smoking status: Never Smoker     Smokeless tobacco: Never Used   Vaping Use     Vaping Use: Never used   Substance and Sexual Activity     Alcohol use: Yes     Alcohol/week: 0.0 standard drinks     Comment: occ.     Drug use: No     Sexual activity: Yes     Partners: Male     Birth control/protection: Male Surgical, Female " Surgical     Comment: ablasion and hysterectomy   Other Topics Concern      Service Not Asked     Blood Transfusions Not Asked     Caffeine Concern No     Occupational Exposure Not Asked     Hobby Hazards Not Asked     Sleep Concern No     Stress Concern Yes     Weight Concern Yes     Special Diet Not Asked     Back Care Not Asked     Exercise Yes     Bike Helmet Not Asked     Seat Belt Yes     Self-Exams Not Asked     Parent/sibling w/ CABG, MI or angioplasty before 65F 55M? No   Social History Narrative     Not on file     Social Determinants of Health     Financial Resource Strain: Not on file   Food Insecurity: Not on file   Transportation Needs: Not on file   Physical Activity: Not on file   Stress: Not on file   Social Connections: Not on file   Intimate Partner Violence: Not on file   Housing Stability: Not on file       Current Outpatient Medications   Medication Sig Dispense Refill     calcium carbonate (OS-EDIS 500 MG [place]. [place]) 1250 MG tablet Take 1 tablet by mouth daily       EPINEPHrine (EPIPEN 2-ROMARIO) 0.3 MG/0.3ML injection 2-pack Inject 0.3 mLs (0.3 mg) into the muscle as needed for anaphylaxis 0.6 mL 1     Lactobacillus (PROBIOTIC ACIDOPHILUS PO)        metroNIDAZOLE (METROCREAM) 0.75 % external cream Apply topically 2 times daily 45 g 7     montelukast (SINGULAIR) 10 MG tablet Take 1 tablet (10 mg) by mouth At Bedtime Patient needs to be seen in clinic. 30 tablet 0     Multiple Vitamin (MULTIVITAMIN ADULT PO)        Naltrexone HCl POWD        olopatadine (PATANOL) 0.1 % ophthalmic solution Place 1 drop into both eyes 2 times daily 1 Bottle 4     ORDER FOR ALLERGEN IMMUNOTHERAPY Abdiaziz, White 1:20 w/v, HS  0.5 ml  Birch Mix PRW 1:20 w/v, HS  0.5 ml  Boxelder-Maple Mix BHR (Boxelder Hard Red) 1:20 w/v, HS  0.5 ml  Cedar, Red 1:20 w/v, HS  0.5 ml  Seneca, Common 1:20 w/v, HS  0.5 ml  Elm, American 1:20 w/v, HS  0.5 ml  [place] Mix RW 1:20 w/v, HS 0.5 ml  Oak Mix RVW 1:20 w/v, HS 0.5 ml  Pine,  White 1:20 w/v, ALK 0.5 ml  [place] Tree, Black 1:20 w/v, HS 0.5 ml  Diluent: HSA qs to 5ml 5 mL prn     ORDER FOR ALLERGEN IMMUNOTHERAPY Dog Hair-Dander, A. P.  1:100 w/v, HS  1.0 ml  Dust Mites DF. 10,000 AU/mL, HS  0.5 ml  Dust Mites DP. 10,000 AU/mL, HS  0.5 ml   Richard Grass 1:20 w/v, HS 0.5 ml  Bonilla Grass (Std) 100,000 BAU/mL, HS 0.4 ml  Diluent: HSA qs to 5ml 5 mL prn     ORDER FOR ALLERGEN IMMUNOTHERAPY Kochia 1:20 w/v, HS 0.5 ml  Lamb's Quarters 1:20 w/v, HS 0.5 ml  Nettle 1:20 w/v, HS 0.5 ml  Plantain, English 1:20 w/v, HS 0.5 ml  Ragweed, Mix (Giant, Short) 1:20 w/v, HS 0.5 ml  Russian Thistle 1:20 w/v, HS 0.5 ml  Sagebrush, Mugwort 1:20 w/v, HS 0.5 ml  Sorrel, Sheep 1:20 w/v, HS 0.5 ml  Diluent: HSA qs to 5ml 5 mL prn     ORDER FOR ALLERGEN IMMUNOTHERAPY Cat Hair, Standardized A.P. 10,000 BAU/mL, HS  2.0 ml   Alternaria Tenuis 1:10 w/v, HS  0.5 ml  Aspergillus Fumigatus 1:10 w/v, HS  0.5 ml  Epicoccum Nigrum 1:10 w/v, HS 0.5 ml  Penicillium Notatum 1:10 w/v, HS  0.5 ml  Diluent: HSA qs to 5ml 5 mL prn     oxyCODONE (ROXICODONE) 5 MG tablet Take 1-2 tablets (5-10 mg) by mouth every 3 hours as needed for severe pain 12 tablet 0     polyethylene glycol (MIRALAX) 17 GM/Dose powder Take 17 g (1 capful) by mouth daily as needed for constipation (If no bowel movement in 24 hours. Mix in 8 oz of water.  May take twice daily.) 500 g 0     POTASSIUM PO        progesterone POWD powder        SUMAtriptan (IMITREX) 50 MG tablet Take 1 tablet (50 mg) by mouth See Admin Instructions For ONSET OF MIGRAINE, MAY REPEAT ONCE AFTER 2 HRS. DO NOT EXCEED 4 TABLETS IN 24 HRS. (Patient not taking: Reported on 10/22/2021) 12 tablet prn     triamcinolone (NASACORT) 55 MCG/ACT nasal aerosol Spray 2 sprays into both nostrils daily 1 Bottle 11     UNABLE TO FIND Apply topically daily MEDICATION NAME: testosterone cream       VITAMIN D, CHOLECALCIFEROL, PO Take 10,000 Units by mouth daily         Medications and history  "reviewed    Physical exam:  Vitals: /70   Temp 98.8  F (37.1  C) (Temporal)   Ht 1.71 m (5' 7.32\")   Wt 83 kg (183 lb)   LMP 06/15/2015 (Approximate)   BMI 28.39 kg/m    BMI= Body mass index is 28.39 kg/m .    HEART: RRR, no new murmurs  LUNGS: CTAB, equal chest rise, good effort  ABD: soft, non tender, non distended  Rectal: Externally no erythema, bleeding or drainage, no open portions of incision, everything has completely healed, no residual skin tissue there to explain her concerns  EXT: GIL, no deformities    PATHOLOGY:  External hemorrhoids, hemorrhoidectomy:  - Benign squamous lined fibrous stroma with focal mildly dilated blood filled vascular channels.    Assessment:     ICD-10-CM    1. S/P hemorrhoidectomy  Z98.890     Z87.19      Plan: Reassured her that everything seems to be healing very well. She understands. Return prn.    Mateo Warren, DO    "

## 2021-12-17 ENCOUNTER — TELEPHONE (OUTPATIENT)
Dept: ALLERGY | Facility: OTHER | Age: 51
End: 2021-12-17
Payer: COMMERCIAL

## 2021-12-17 NOTE — TELEPHONE ENCOUNTER
Pt left VM on allergy shot line requesting allergy serums mailed to [place] as patient moved there. MA called patient back, no answer, left message to call allergy back to clarify request.      Christy Vegas MA

## 2021-12-17 NOTE — TELEPHONE ENCOUNTER
Pt called back. She will be transferring care down in [place] as she lives down there.Release of information filled out to send serums. Pt aware that we are not able to accept them back. Will plan on mailing out serums when back in ER 10/21. Pt gave MA address below.       [facility]  Attention: Dr. Dominguez  [address] [place] [id number]      Christy Vegas MA

## 2021-12-22 NOTE — TELEPHONE ENCOUNTER
Pt informed that we will overnight her allergy serums to [place] Tuesday 12/28. Manager will  serums in [place] and bring to  for over night delivery.      Christy Vegas MA

## 2021-12-30 NOTE — TELEPHONE ENCOUNTER
Iraj benton from [facility] requesting additional information on allergy shots. Information allergy serum recipe/labs faxed to [phone].      Christy Vegas MA

## 2022-02-18 NOTE — TELEPHONE ENCOUNTER
Allegra-nazanin is a paper script. It cannot be sent electronically. I should have handed her a paper script yesterday. It was ordered yesterday. If she does not have we can reprint and fax to pharmacy. I will send script for eyedrop. Must not yet be at this pharmacy as OTC. Thanks.     Dr. Lowry   No

## 2022-07-30 ENCOUNTER — HEALTH MAINTENANCE LETTER (OUTPATIENT)
Age: 52
End: 2022-07-30

## 2022-10-09 ENCOUNTER — HEALTH MAINTENANCE LETTER (OUTPATIENT)
Age: 52
End: 2022-10-09

## 2023-08-19 ENCOUNTER — HEALTH MAINTENANCE LETTER (OUTPATIENT)
Age: 53
End: 2023-08-19

## 2023-10-11 NOTE — PROGRESS NOTES
Coleen Martinez from [facility] calling to verify directions of new medication     simvastatin 40 MG Oral Tab 90 tablet No show

## 2023-10-28 ENCOUNTER — HEALTH MAINTENANCE LETTER (OUTPATIENT)
Age: 53
End: 2023-10-28

## 2024-10-12 ENCOUNTER — HEALTH MAINTENANCE LETTER (OUTPATIENT)
Age: 54
End: 2024-10-12

## 2025-01-02 NOTE — TELEPHONE ENCOUNTER
Order placed they will call her to set up  Tiffanie Montes PA-C   
Reason for call:  Order   Order or referral being requested: pre procedure COVID-19 test   Reason for request: 3 days before 10/25/21  Date needed: as soon as possible  Has the patient been seen by the PCP for this problem? YES    Additional comments: patient needs a order for a pre procedure covid-19 test.    Phone number to reach patient:  Cell number on file:    Telephone Information:   Mobile [phone]       Best Time:  Anytime     Can we leave a detailed message on this number?  YES    Travel screening: Not Applicable    
no

## (undated) DEVICE — SU VICRYL 3-0 SH 27" UND J416H

## (undated) DEVICE — SYR EAR BULB 2OZ

## (undated) DEVICE — GLOVE PROTEXIS W/NEU-THERA 7.5  2D73TE75

## (undated) DEVICE — DRAPE LAP W/ARMBOARD 29410

## (undated) DEVICE — ADH LIQUID MASTISOL TOPICAL VIAL 2-3ML 0523-48

## (undated) DEVICE — DRSG ABDOMINAL 07 1/2X8" 7197D

## (undated) DEVICE — GLOVE PROTEXIS BLUE W/NEU-THERA 8.0  2D73EB80

## (undated) DEVICE — PACK MINOR PROCEDURE CUSTOM

## (undated) DEVICE — SUCTION MANIFOLD NEPTUNE 2 SYS 4 PORT 0702-020-000

## (undated) RX ORDER — FENTANYL CITRATE 50 UG/ML
INJECTION, SOLUTION INTRAMUSCULAR; INTRAVENOUS
Status: DISPENSED
Start: 2021-10-25